# Patient Record
Sex: FEMALE | Race: WHITE | NOT HISPANIC OR LATINO | Employment: FULL TIME | URBAN - METROPOLITAN AREA
[De-identification: names, ages, dates, MRNs, and addresses within clinical notes are randomized per-mention and may not be internally consistent; named-entity substitution may affect disease eponyms.]

---

## 2017-04-25 ENCOUNTER — ALLSCRIPTS OFFICE VISIT (OUTPATIENT)
Dept: OTHER | Facility: OTHER | Age: 42
End: 2017-04-25

## 2017-11-03 ENCOUNTER — LAB REQUISITION (OUTPATIENT)
Dept: LAB | Facility: HOSPITAL | Age: 42
End: 2017-11-03
Payer: COMMERCIAL

## 2017-11-03 ENCOUNTER — GENERIC CONVERSION - ENCOUNTER (OUTPATIENT)
Dept: OTHER | Facility: OTHER | Age: 42
End: 2017-11-03

## 2017-11-03 DIAGNOSIS — Z11.51 ENCOUNTER FOR SCREENING FOR HUMAN PAPILLOMAVIRUS (HPV): ICD-10-CM

## 2017-11-03 DIAGNOSIS — Z12.4 ENCOUNTER FOR SCREENING FOR MALIGNANT NEOPLASM OF CERVIX: ICD-10-CM

## 2017-11-03 DIAGNOSIS — Z12.31 ENCOUNTER FOR SCREENING MAMMOGRAM FOR MALIGNANT NEOPLASM OF BREAST: ICD-10-CM

## 2017-11-03 DIAGNOSIS — N92.6 IRREGULAR MENSTRUATION: ICD-10-CM

## 2017-11-03 PROCEDURE — G0145 SCR C/V CYTO,THINLAYER,RESCR: HCPCS | Performed by: NURSE PRACTITIONER

## 2017-11-03 PROCEDURE — 87624 HPV HI-RISK TYP POOLED RSLT: CPT | Performed by: NURSE PRACTITIONER

## 2017-11-08 LAB — HPV RRNA GENITAL QL NAA+PROBE: NORMAL

## 2017-11-10 ENCOUNTER — GENERIC CONVERSION - ENCOUNTER (OUTPATIENT)
Dept: OTHER | Facility: OTHER | Age: 42
End: 2017-11-10

## 2017-11-10 LAB
LAB AP GYN PRIMARY INTERPRETATION: NORMAL
Lab: NORMAL

## 2017-11-13 ENCOUNTER — GENERIC CONVERSION - ENCOUNTER (OUTPATIENT)
Dept: OTHER | Facility: OTHER | Age: 42
End: 2017-11-13

## 2017-12-04 ENCOUNTER — GENERIC CONVERSION - ENCOUNTER (OUTPATIENT)
Dept: OTHER | Facility: OTHER | Age: 42
End: 2017-12-04

## 2018-01-13 NOTE — MISCELLANEOUS
Message   Recorded as Task   Date: 11/10/2017 03:58 PM, Created By: Carlos Ledbetter   Task Name: Care Coordination   Assigned To: [facility]   Regarding Patient: Preeti Welch, Status: In Progress   Clarisse Crockre - 10 Nov 2017 3:58 PM     TASK CREATED  Normal TSH   Please notify patient   Recommedn proceeding with TVUS as discussed     Thanks   Carmella Oro - 10 Nov 2017 4:08 PM     TASK IN PROGRESS   Carmella Oro - 10 Nov 2017 4:09 PM     TASK EDITED  Patient is aware of results           Active Problems    1  Cervical cancer screening (V76 2) (Z12 4)   2  Encounter for routine gynecological examination (V72 31) (Z01 419)   3  Encounter for screening mammogram for malignant neoplasm of breast (V76 12)   (Z12 31)   4  Irregular menses (626 4) (N92 6)   5  Ovarian cyst (620 2) (N83 20)   6  Pain of left clavicle (733 90) (M89 8X1)   7  Screening for breast cancer (V76 10) (Z12 31)   8  Screening for human papillomavirus (HPV) (V73 81) (Z11 51)    Current Meds   1  Fluticasone Propionate 50 MCG/ACT Nasal Suspension; USE 1 SPRAY IN EACH   NOSTRIL ONCE DAILY; Therapy: [id number] to (Last Rx:[id number])  Requested for: 51Htc9727 Ordered    Allergies    1   Amoxicillin CAPS    Signatures   Electronically signed by : Radha Urbina, ; Nov 10 2017  4:09PM EST                       (Author)

## 2018-01-13 NOTE — MISCELLANEOUS
Message   Recorded as Task   Date: 11/10/2017 02:33 PM, Created By: Yamilex Bartlett   Task Name: Call Back   Assigned To: JAYY GYN,Team   Regarding Patient: Codie Gamez, Status: In Progress   Comment:    Angelique Velásquez - 10 Nov 2017 2:33 PM     TASK CREATED  Pt called office today, left voicemail message  Pt had "new patient" appointment with Tyree Valenzuela on 11/3/17  CHAKA Monroy assessed Pt with irregular bleeding, ordered ultrasound  Pt states she completed blood work, wants to know does she still need to complete ultrasound  Pt can be reached @ [phone]  Thank you! Karina Villareal - 10 Nov 2017 3:03 PM     TASK EDITED   Karina Villareal - 10 Nov 2017 3:12 PM     TASK EDITED  Patient is going to [facility] for US, they questioned whether or not it is transvaginal when she scheduled  The order does not say that, she wants to know if that's what we want, if so new order needs to be mailed to patient  Karina Villareal - 10 Nov 2017 3:12 PM     TASK EDITED   Hillary Sigala - 10 Nov 2017 3:46 PM     TASK REPLIED TO: Previously Assigned To Hillary Sigala  Yes - transvaginal US, please   Yoshi Tsang - 13 Nov 2017 10:25 AM     TASK EDITED  lmtcb  transvag and transvag u/s entered   Mecca Shoulders - 13 Nov 2017 5:21 PM     TASK EDITED  pt will cb with fax #        Active Problems    1  Cervical cancer screening (V76 2) (Z12 4)   2  Encounter for routine gynecological examination (V72 31) (Z01 419)   3  Encounter for screening mammogram for malignant neoplasm of breast (V76 12)   (Z12 31)   4  Irregular menses (626 4) (N92 6)   5  Ovarian cyst (620 2) (N83 20)   6  Pain of left clavicle (733 90) (M89 8X1)   7  Screening for breast cancer (V76 10) (Z12 31)   8  Screening for human papillomavirus (HPV) (V73 81) (Z11 51)    Current Meds   1  Fluticasone Propionate 50 MCG/ACT Nasal Suspension; USE 1 SPRAY IN EACH   NOSTRIL ONCE DAILY;    Therapy: [id number] to (Last Rx:25Apr2017)  Requested for: 25Apr2017 Ordered    Allergies    1   Amoxicillin CAPS    Signatures   Electronically signed by : Eleanor Reza, ; Nov 13 2017  5:21PM EST                       (Author)

## 2018-01-15 VITALS
HEART RATE: 87 BPM | TEMPERATURE: 97.6 F | BODY MASS INDEX: 33.46 KG/M2 | OXYGEN SATURATION: 98 % | SYSTOLIC BLOOD PRESSURE: 108 MMHG | WEIGHT: 196 LBS | DIASTOLIC BLOOD PRESSURE: 78 MMHG | HEIGHT: 64 IN

## 2018-01-23 NOTE — MISCELLANEOUS
Message   Recorded as Task   Date: 12/01/2017 03:18 PM, Created By: Yasemin Kang   Task Name: Co-Sign Note   Assigned To: Rocael Falk   Regarding Patient: Anibal Rivera, Status: In Progress   Beronica Coleman - 01 Dec 2017 3:18 PM     TASK CREATED  Benign findings on pelvic uS and TSH was WNL  These were ordered for new onset irreg menses  Please notify patient of no underlying gyn concerns and advised continuing to observe  She may f/u any time if she desires active management  Thanks   Carmella Oro - 04 Dec 2017 8:53 AM     TASK IN PROGRESS   Carmella Oro - 04 Dec 2017 8:56 AM     TASK EDITED  Per HIPPA - LM for pt of u/s results and TSH results  If any questions to call back  Active Problems    1  Cervical cancer screening (V76 2) (Z12 4)   2  Encounter for routine gynecological examination (V72 31) (Z01 419)   3  Encounter for screening mammogram for malignant neoplasm of breast (V76 12)   (Z12 31)   4  Irregular menses (626 4) (N92 6)   5  Ovarian cyst (620 2) (N83 20)   6  Pain of left clavicle (733 90) (M89 8X1)   7  Screening for breast cancer (V76 10) (Z12 31)   8  Screening for human papillomavirus (HPV) (V73 81) (Z11 51)    Current Meds   1  Fluticasone Propionate 50 MCG/ACT Nasal Suspension; USE 1 SPRAY IN EACH   NOSTRIL ONCE DAILY; Therapy: [id number] to (Last Rx:94Qnk7911)  Requested for: 79Zjq5523 Ordered    Allergies    1   Amoxicillin CAPS    Signatures   Electronically signed by : Khris Ramachandran, ; Dec  4 2017  8:57AM EST                       (Author)

## 2018-05-11 ENCOUNTER — OFFICE VISIT (OUTPATIENT)
Dept: INTERNAL MEDICINE CLINIC | Age: 43
End: 2018-05-11
Payer: COMMERCIAL

## 2018-05-11 VITALS
TEMPERATURE: 98.6 F | DIASTOLIC BLOOD PRESSURE: 86 MMHG | SYSTOLIC BLOOD PRESSURE: 122 MMHG | HEART RATE: 81 BPM | BODY MASS INDEX: 34.02 KG/M2 | HEIGHT: 65 IN | WEIGHT: 204.2 LBS | OXYGEN SATURATION: 98 %

## 2018-05-11 DIAGNOSIS — L65.9 HAIR LOSS: ICD-10-CM

## 2018-05-11 DIAGNOSIS — R51.9 ACUTE INTRACTABLE HEADACHE, UNSPECIFIED HEADACHE TYPE: ICD-10-CM

## 2018-05-11 DIAGNOSIS — Z13.228 SCREENING FOR METABOLIC DISORDER: Primary | ICD-10-CM

## 2018-05-11 PROBLEM — N92.6 IRREGULAR MENSES: Status: ACTIVE | Noted: 2017-11-03

## 2018-05-11 PROCEDURE — 1036F TOBACCO NON-USER: CPT | Performed by: NURSE PRACTITIONER

## 2018-05-11 PROCEDURE — 99214 OFFICE O/P EST MOD 30 MIN: CPT | Performed by: NURSE PRACTITIONER

## 2018-05-11 PROCEDURE — 3008F BODY MASS INDEX DOCD: CPT | Performed by: NURSE PRACTITIONER

## 2018-05-11 RX ORDER — MULTIVITAMIN/IRON/FOLIC ACID 18MG-0.4MG
1 TABLET ORAL AS NEEDED
COMMUNITY

## 2018-05-11 NOTE — ASSESSMENT & PLAN NOTE
Will get TSH vitamin D  And fasting blood work  Encouraged the patient to take over-the-counter biotin  If patient's hair loss increases patient is to come back to the office to be seen, with possible referral to Dermatology  It is felt that her hair loss is related to hormone changes as TSH was checked recently and within normal limits

## 2018-05-11 NOTE — ASSESSMENT & PLAN NOTE
Patient feels that her headaches are related to allergies  She reports she has seasonal allergies  Will give over-the-counter Flonase 2 sprays each nostril daily and advised patient to take Zyrtec at night prior to going to bed  Discussed red flag signs of headaches and when to report to the emergency room

## 2018-05-11 NOTE — PATIENT INSTRUCTIONS
Biotin  Continue to take multi-vitamins  Use Flonase 2 sprays each nostril daily in the morning  Take Zyrtec once a daily in the evening

## 2018-12-11 ENCOUNTER — OFFICE VISIT (OUTPATIENT)
Dept: INTERNAL MEDICINE CLINIC | Facility: CLINIC | Age: 43
End: 2018-12-11
Payer: COMMERCIAL

## 2018-12-11 VITALS
BODY MASS INDEX: 31.31 KG/M2 | SYSTOLIC BLOOD PRESSURE: 132 MMHG | DIASTOLIC BLOOD PRESSURE: 74 MMHG | OXYGEN SATURATION: 99 % | TEMPERATURE: 98.7 F | WEIGHT: 194.8 LBS | HEIGHT: 66 IN | HEART RATE: 87 BPM

## 2018-12-11 DIAGNOSIS — F32.A ANXIETY AND DEPRESSION: ICD-10-CM

## 2018-12-11 DIAGNOSIS — L65.9 HAIR LOSS: Primary | ICD-10-CM

## 2018-12-11 DIAGNOSIS — F41.9 ANXIETY AND DEPRESSION: ICD-10-CM

## 2018-12-11 PROCEDURE — 99214 OFFICE O/P EST MOD 30 MIN: CPT | Performed by: INTERNAL MEDICINE

## 2018-12-11 RX ORDER — MELATONIN
1000 DAILY
COMMUNITY
End: 2019-09-17 | Stop reason: ALTCHOICE

## 2018-12-11 RX ORDER — ESCITALOPRAM OXALATE 10 MG/1
10 TABLET ORAL DAILY
Qty: 30 TABLET | Refills: 1 | Status: SHIPPED | OUTPATIENT
Start: 2018-12-11 | End: 2019-01-23 | Stop reason: SDUPTHER

## 2018-12-11 NOTE — PROGRESS NOTES
MEDICAL STUDENT  Inpatient Progress Note for TRAINING ONLY  Not Part of Legal Medical Record       Progress Note - Sabas Drilling 37 y o  female MRN: [id number]    Unit/Bed#:  Encounter: [id number]      Assessment:  ***    Plan:  ***    Subjective:   ***    Objective:     Vitals: Blood pressure 132/74, pulse 87, temperature 98 7 °F (37 1 °C), temperature source Oral, height 5' 5 5" (1 664 m), weight 88 4 kg (194 lb 12 8 oz), SpO2 99 %  ,Body mass index is 31 92 kg/m²      [unfilled]    Physical Exam: {Exam, Complete:88309}     Invasive Devices          No matching active lines, drains, or airways          Lab, Imaging and other studies: {Results Review Statement:79825}  VTE Pharmacologic Prophylaxis: {Pharmacologic VTE Prophylaxis:751066461}  VTE Mechanical Prophylaxis: {Mechanical VTE Prophylaxis:79794}

## 2018-12-11 NOTE — PROGRESS NOTES
Assessment/Plan:    No problem-specific Assessment & Plan notes found for this encounter  Diagnoses and all orders for this visit:    Hair loss  For the hair loss I will check the TSH and ANGELICA also I am checking the vitamin-D level  Discussed with patient the her loss could be because of the anxiety stress and depression which can cause the hair loss so we will visit after the blood workup  Anxiety and depression  This is around the going on for several weeks and months that she is feeling depressed as given below I will start her on Lexapro 5 mg to start with increasing it to 10 mg after 1 week  Other orders  -     cholecalciferol (VITAMIN D3) 1,000 units tablet; Take 1,000 Units by mouth daily        Subjective:   As given above   Patient ID: Fidel Snellen is a 37 y o  female  Patient presents stating she has increased depression and anxiety as well as worsening hair loss over the past month  This has been an issue for her since around April/May and discussed it the last time she was here but feels it may be worsening  She was referred to get bloodwork done to rule out other pathology but she did not get it done because she was feeling better at the time  She does take Vitamin D daily per recommendation but stopped taking Biotin  She notices the hair loss the most in the shower, stating clumps come out at a time  She reports thinning of the frontal portions of her hair but denies specific patches of hair loss  Depression/Anxiety:  She feels her emotions alterate from either feeling down of being anxious  Her  noticed her change in behavior and recommended she see a physician  Decreased interest in things she used to enjoy such as going out with her friends and has canceled on them on occasion  Sometimes doesn't feel like eating when she gets worked up   Sleeping has become more difficult for her, stating she will wake up about once per night and it will take her a long time to go back to sleep  Has never been on medications or been to counseling  Anxiety   Presents for initial visit  Episode onset: since April/May  Symptoms include depressed mood, excessive worry, insomnia (wake during the night and can't go back to sleep), nausea and nervous/anxious behavior  Patient reports no chest pain, decreased concentration, dizziness, hyperventilation, malaise, palpitations, panic, shortness of breath or suicidal ideas  Symptoms occur most days (half the week)  The severity of symptoms is moderate  Exacerbated by: worry  The quality of sleep is poor  Nighttime awakenings: one to two (once but can't go back to sleep for couple hours)  Depression   Associated symptoms include nausea  Pertinent negatives include no abdominal pain, chest pain, chills, fatigue, fever or headaches  The following portions of the patient's history were reviewed and updated as appropriate: allergies, current medications, past family history, past medical history, past social history, past surgical history and problem list     Review of Systems   Constitutional: Positive for appetite change (sometimes if get worked up won't want to eat)  Negative for chills, fatigue and fever  Respiratory: Negative for shortness of breath  Cardiovascular: Negative for chest pain, palpitations and leg swelling  Gastrointestinal: Positive for nausea  Negative for abdominal pain  Genitourinary: Negative for difficulty urinating and dysuria  Neurological: Negative for dizziness, seizures, light-headedness and headaches  Psychiatric/Behavioral: Positive for depression and sleep disturbance  Negative for decreased concentration, hallucinations, self-injury and suicidal ideas  The patient is nervous/anxious and has insomnia (wake during the night and can't go back to sleep)  The patient is not hyperactive            Objective:      /74 (BP Location: Left arm, Patient Position: Sitting, Cuff Size: Large)   Pulse 87   Temp 98 7 °F (37 1 °C) (Oral)   Ht 5' 5 5" (1 664 m)   Wt 88 4 kg (194 lb 12 8 oz)   SpO2 99%   BMI 31 92 kg/m²          Physical Exam   Constitutional: She is oriented to person, place, and time  She appears well-developed and well-nourished  No distress  HENT:   Head: Normocephalic and atraumatic  Right Ear: External ear normal    Left Ear: External ear normal    Nose: Nose normal    Mouth/Throat: Oropharynx is clear and moist  No oropharyngeal exudate  Hair is noted to be thin in nature, mostly around patient's face  No patches of hair missing  Eyes: Pupils are equal, round, and reactive to light  Conjunctivae and EOM are normal  Right eye exhibits no discharge  Left eye exhibits no discharge  Neck: Normal range of motion  Neck supple  No thyromegaly present  Cardiovascular: Normal rate, regular rhythm, normal heart sounds and intact distal pulses  Exam reveals no gallop and no friction rub  No murmur heard  Pulmonary/Chest: Effort normal and breath sounds normal  No stridor  No respiratory distress  She has no wheezes  She has no rales  Abdominal: Soft  Bowel sounds are normal  She exhibits no distension  There is no tenderness  Lymphadenopathy:     She has no cervical adenopathy  Neurological: She is alert and oriented to person, place, and time  Skin: Skin is warm and dry  No rash noted  She is not diaphoretic  No erythema  Psychiatric: She has a normal mood and affect  Her behavior is normal  Judgment and thought content normal    Nursing note and vitals reviewed

## 2019-01-23 ENCOUNTER — OFFICE VISIT (OUTPATIENT)
Dept: INTERNAL MEDICINE CLINIC | Age: 44
End: 2019-01-23
Payer: COMMERCIAL

## 2019-01-23 VITALS
TEMPERATURE: 99.4 F | BODY MASS INDEX: 33.8 KG/M2 | HEART RATE: 89 BPM | WEIGHT: 198 LBS | OXYGEN SATURATION: 98 % | DIASTOLIC BLOOD PRESSURE: 78 MMHG | HEIGHT: 64 IN | SYSTOLIC BLOOD PRESSURE: 116 MMHG

## 2019-01-23 DIAGNOSIS — E55.9 VITAMIN D DEFICIENCY: Primary | ICD-10-CM

## 2019-01-23 DIAGNOSIS — L65.9 HAIR LOSS: ICD-10-CM

## 2019-01-23 DIAGNOSIS — F32.A ANXIETY AND DEPRESSION: ICD-10-CM

## 2019-01-23 DIAGNOSIS — F41.9 ANXIETY AND DEPRESSION: ICD-10-CM

## 2019-01-23 PROCEDURE — 3008F BODY MASS INDEX DOCD: CPT | Performed by: INTERNAL MEDICINE

## 2019-01-23 PROCEDURE — 99214 OFFICE O/P EST MOD 30 MIN: CPT | Performed by: INTERNAL MEDICINE

## 2019-01-23 RX ORDER — ERGOCALCIFEROL 1.25 MG/1
50000 CAPSULE ORAL WEEKLY
Qty: 8 CAPSULE | Refills: 0 | Status: SHIPPED | OUTPATIENT
Start: 2019-01-23 | End: 2019-03-27 | Stop reason: SDUPTHER

## 2019-01-23 RX ORDER — ESCITALOPRAM OXALATE 10 MG/1
10 TABLET ORAL DAILY
Qty: 30 TABLET | Refills: 3 | Status: SHIPPED | OUTPATIENT
Start: 2019-01-23 | End: 2019-09-17 | Stop reason: SDUPTHER

## 2019-01-23 RX ORDER — ESCITALOPRAM OXALATE 10 MG/1
10 TABLET ORAL DAILY
Qty: 30 TABLET | Refills: 1 | Status: SHIPPED | OUTPATIENT
Start: 2019-01-23 | End: 2019-01-23 | Stop reason: SDUPTHER

## 2019-01-23 NOTE — PROGRESS NOTES
Assessment/Plan:    No problem-specific Assessment & Plan notes found for this encounter  Diagnoses and all orders for this visit:    Hair loss  Hair loss is better than before that is she has losing less hair as compared to before  Anxiety and depression  -     escitalopram (LEXAPRO) 10 mg tablet; Take 1 tablet (10 mg total) by mouth daily    patient's anxiety and depression is better than before, she is enjoying her time or with the family and the kids, she is not crying much, sadness is better than before, I am side effects of the medication but now she is feeling better and she is tolerating the medication, her sleep is better I reviewed the blood workup thyroid functions were normal blood count chemistries were all within normal range only problem was vitamin-D deficiency which we are addressing by giving her the supplement    Subjective:      Patient ID: Doug Urbina is a 37 y o  female  HPI    The following portions of the patient's history were reviewed and updated as appropriate: allergies, current medications, past family history, past medical history, past social history, past surgical history and problem list     Review of Systems   Constitutional: Negative for appetite change (sometimes if get worked up won't want to eat), chills, fatigue and fever  Respiratory: Negative for shortness of breath  Cardiovascular: Negative for chest pain, palpitations and leg swelling  Gastrointestinal: Negative for abdominal pain and nausea  Genitourinary: Negative for difficulty urinating and dysuria  Neurological: Negative for dizziness, seizures, light-headedness and headaches  Psychiatric/Behavioral: Negative for decreased concentration, hallucinations, self-injury, sleep disturbance and suicidal ideas  The patient is nervous/anxious  The patient is not hyperactive            Past Medical History:   Diagnosis Date    Back disorder          Current Outpatient Prescriptions:    cholecalciferol (VITAMIN D3) 1,000 units tablet, Take 1,000 Units by mouth daily, Disp: , Rfl:     escitalopram (LEXAPRO) 10 mg tablet, Take 1 tablet (10 mg total) by mouth daily, Disp: 30 tablet, Rfl: 1    multivitamin-minerals (CENTRUM ADULTS) tablet, Take 1 tablet by mouth daily, Disp: , Rfl:     Allergies   Allergen Reactions    Amoxicillin        Social History   Past Surgical History:   Procedure Laterality Date    BACK SURGERY       SECTION      CHOLECYSTECTOMY      INDUCED       times 2    TUBAL LIGATION       Family History   Problem Relation Age of Onset    Diabetes Mother     Hypertension Mother     Anxiety disorder Family         symptom    Other Family         breast disorders    Heart disease Other         cardiac disorder    Heart disease Other         cardiac disorder    Depression Father     Depression Sister        Objective:  /78 (BP Location: Left arm, Patient Position: Sitting, Cuff Size: Adult)   Pulse 89   Temp 99 4 °F (37 4 °C) (Tympanic)   Ht 5' 4 47" (1 638 m)   Wt 89 8 kg (198 lb)   SpO2 98%   BMI 33 49 kg/m²        Physical Exam   Constitutional: She is oriented to person, place, and time  She appears well-developed and well-nourished  No distress  HENT:   Head: Normocephalic and atraumatic  Right Ear: External ear normal    Left Ear: External ear normal    Nose: Nose normal    Mouth/Throat: Oropharynx is clear and moist  No oropharyngeal exudate  Hair is noted to be thin in nature, mostly around patient's face  No patches of hair missing  Eyes: Pupils are equal, round, and reactive to light  Conjunctivae and EOM are normal  Right eye exhibits no discharge  Left eye exhibits no discharge  Neck: Normal range of motion  Neck supple  No thyromegaly present  Cardiovascular: Normal rate, regular rhythm, normal heart sounds and intact distal pulses  Exam reveals no gallop and no friction rub  No murmur heard    Pulmonary/Chest: Effort normal and breath sounds normal  No stridor  No respiratory distress  She has no wheezes  She has no rales  Abdominal: Soft  Bowel sounds are normal  She exhibits no distension  There is no tenderness  Lymphadenopathy:     She has no cervical adenopathy  Neurological: She is alert and oriented to person, place, and time  Skin: Skin is warm and dry  No rash noted  She is not diaphoretic  No erythema  Psychiatric: She has a normal mood and affect  Her behavior is normal  Judgment and thought content normal    Nursing note and vitals reviewed  No results found for this or any previous visit (from the past 672 hour(s))

## 2019-03-27 DIAGNOSIS — E55.9 VITAMIN D DEFICIENCY: ICD-10-CM

## 2019-03-27 RX ORDER — ERGOCALCIFEROL 1.25 MG/1
CAPSULE ORAL
Qty: 8 CAPSULE | Refills: 0 | Status: SHIPPED | OUTPATIENT
Start: 2019-03-27 | End: 2019-06-11 | Stop reason: ALTCHOICE

## 2019-05-11 DIAGNOSIS — L65.9 HAIR LOSS: ICD-10-CM

## 2019-05-11 DIAGNOSIS — F41.9 ANXIETY AND DEPRESSION: ICD-10-CM

## 2019-05-11 DIAGNOSIS — F32.A ANXIETY AND DEPRESSION: ICD-10-CM

## 2019-05-13 RX ORDER — ESCITALOPRAM OXALATE 10 MG/1
TABLET ORAL
Qty: 30 TABLET | Refills: 1 | Status: SHIPPED | OUTPATIENT
Start: 2019-05-13 | End: 2019-06-11 | Stop reason: SDUPTHER

## 2019-06-07 DIAGNOSIS — F41.9 ANXIETY AND DEPRESSION: ICD-10-CM

## 2019-06-07 DIAGNOSIS — L65.9 HAIR LOSS: ICD-10-CM

## 2019-06-07 DIAGNOSIS — F32.A ANXIETY AND DEPRESSION: ICD-10-CM

## 2019-06-11 ENCOUNTER — OFFICE VISIT (OUTPATIENT)
Dept: INTERNAL MEDICINE CLINIC | Facility: CLINIC | Age: 44
End: 2019-06-11
Payer: COMMERCIAL

## 2019-06-11 VITALS
DIASTOLIC BLOOD PRESSURE: 70 MMHG | BODY MASS INDEX: 33.89 KG/M2 | WEIGHT: 203.4 LBS | HEIGHT: 65 IN | HEART RATE: 83 BPM | TEMPERATURE: 98.1 F | SYSTOLIC BLOOD PRESSURE: 104 MMHG | OXYGEN SATURATION: 98 %

## 2019-06-11 DIAGNOSIS — F32.A ANXIETY AND DEPRESSION: ICD-10-CM

## 2019-06-11 DIAGNOSIS — F41.9 ANXIETY AND DEPRESSION: ICD-10-CM

## 2019-06-11 DIAGNOSIS — E55.9 VITAMIN D DEFICIENCY: ICD-10-CM

## 2019-06-11 DIAGNOSIS — J30.2 SEASONAL ALLERGIES: Primary | ICD-10-CM

## 2019-06-11 PROCEDURE — 99214 OFFICE O/P EST MOD 30 MIN: CPT | Performed by: NURSE PRACTITIONER

## 2019-06-11 PROCEDURE — 3008F BODY MASS INDEX DOCD: CPT | Performed by: NURSE PRACTITIONER

## 2019-06-11 RX ORDER — ESCITALOPRAM OXALATE 10 MG/1
TABLET ORAL
Qty: 30 TABLET | Refills: 0 | Status: SHIPPED | OUTPATIENT
Start: 2019-06-11 | End: 2019-06-28 | Stop reason: ALTCHOICE

## 2019-06-12 ENCOUNTER — TELEPHONE (OUTPATIENT)
Dept: INTERNAL MEDICINE CLINIC | Facility: CLINIC | Age: 44
End: 2019-06-12

## 2019-06-28 ENCOUNTER — ANNUAL EXAM (OUTPATIENT)
Dept: OBGYN CLINIC | Facility: MEDICAL CENTER | Age: 44
End: 2019-06-28
Payer: COMMERCIAL

## 2019-06-28 VITALS
WEIGHT: 202 LBS | HEIGHT: 64 IN | SYSTOLIC BLOOD PRESSURE: 120 MMHG | DIASTOLIC BLOOD PRESSURE: 74 MMHG | BODY MASS INDEX: 34.49 KG/M2

## 2019-06-28 DIAGNOSIS — Z12.31 ENCOUNTER FOR SCREENING MAMMOGRAM FOR MALIGNANT NEOPLASM OF BREAST: Primary | ICD-10-CM

## 2019-06-28 DIAGNOSIS — Z01.419 ROUTINE GYNECOLOGICAL EXAMINATION: ICD-10-CM

## 2019-06-28 PROCEDURE — 99396 PREV VISIT EST AGE 40-64: CPT | Performed by: PHYSICIAN ASSISTANT

## 2019-09-17 ENCOUNTER — OFFICE VISIT (OUTPATIENT)
Dept: INTERNAL MEDICINE CLINIC | Facility: CLINIC | Age: 44
End: 2019-09-17
Payer: COMMERCIAL

## 2019-09-17 VITALS
HEART RATE: 88 BPM | HEIGHT: 65 IN | DIASTOLIC BLOOD PRESSURE: 70 MMHG | OXYGEN SATURATION: 97 % | BODY MASS INDEX: 33.95 KG/M2 | TEMPERATURE: 98 F | SYSTOLIC BLOOD PRESSURE: 110 MMHG | WEIGHT: 203.8 LBS

## 2019-09-17 DIAGNOSIS — F32.A ANXIETY AND DEPRESSION: ICD-10-CM

## 2019-09-17 DIAGNOSIS — L65.9 HAIR LOSS: ICD-10-CM

## 2019-09-17 DIAGNOSIS — F41.9 ANXIETY AND DEPRESSION: ICD-10-CM

## 2019-09-17 DIAGNOSIS — J30.2 SEASONAL ALLERGIES: ICD-10-CM

## 2019-09-17 PROBLEM — Z13.228 SCREENING FOR METABOLIC DISORDER: Status: RESOLVED | Noted: 2018-05-11 | Resolved: 2019-09-17

## 2019-09-17 PROBLEM — N92.6 IRREGULAR MENSES: Status: RESOLVED | Noted: 2017-11-03 | Resolved: 2019-09-17

## 2019-09-17 PROBLEM — Z01.419 ROUTINE GYNECOLOGICAL EXAMINATION: Status: RESOLVED | Noted: 2019-06-28 | Resolved: 2019-09-17

## 2019-09-17 PROBLEM — R51.9 HEADACHE: Status: RESOLVED | Noted: 2018-05-11 | Resolved: 2019-09-17

## 2019-09-17 PROCEDURE — 99214 OFFICE O/P EST MOD 30 MIN: CPT | Performed by: INTERNAL MEDICINE

## 2019-09-17 PROCEDURE — 3008F BODY MASS INDEX DOCD: CPT | Performed by: INTERNAL MEDICINE

## 2019-09-17 RX ORDER — ESCITALOPRAM OXALATE 10 MG/1
10 TABLET ORAL DAILY
Qty: 90 TABLET | Refills: 1 | Status: SHIPPED | OUTPATIENT
Start: 2019-09-17 | End: 2020-04-30

## 2019-09-17 NOTE — ASSESSMENT & PLAN NOTE
Patient has not pursued a weight management program  She states that she is feeling good about her weight now, she is not concerned about it at this time

## 2019-09-17 NOTE — ASSESSMENT & PLAN NOTE
Patient took Zyrtec for a while but it made her sleepy, so she stopped taking it  She no longer has the dizziness that was felt may be due to the allergies   She is open to discussing non drowsy allergy medication options if the allergies start bothering her again

## 2019-09-17 NOTE — ASSESSMENT & PLAN NOTE
Patient is happy with her current Lexapro dose   She does not currently have any side effects from the medication

## 2019-09-17 NOTE — PROGRESS NOTES
Assessment/Plan:  BMI Counseling: Body mass index is 33 65 kg/m²  The BMI is above normal  Nutrition recommendations include reducing portion sizes, decreasing overall calorie intake, 3-5 servings of fruits/vegetables daily, reducing fast food intake, consuming healthier snacks and decreasing soda and/or juice intake  Exercise recommendations include exercising 3-5 times per week  BMI 33 0-33 9,adult  Patient has not pursued a weight management program  She states that she is feeling good about her weight now, she is not concerned about it at this time    Seasonal allergies  Patient took Zyrtec for a while but it made her sleepy, so she stopped taking it  She no longer has the dizziness that was felt may be due to the allergies  She is open to discussing non drowsy allergy medication options if the allergies start bothering her again    Anxiety and depression  Patient is happy with her current Lexapro dose  She does not currently have any side effects from the medication       Diagnoses and all orders for this visit:    BMI 33 0-33 9,adult    Hair loss  -     escitalopram (LEXAPRO) 10 mg tablet; Take 1 tablet (10 mg total) by mouth daily    Anxiety and depression  -     escitalopram (LEXAPRO) 10 mg tablet; Take 1 tablet (10 mg total) by mouth daily    Seasonal allergies        Subjective:   No new complaints patient is here for the follow-up of above medical problems and she is doing well will follow her up in about 3 months   Patient ID: Chantell Guerrero is a 37 y o  female  HPI   Chantell Guerrero is a 38 yo F who is here for a follow up today  Her anxiety and depression are well controlled on Lexapro  She did try to stop the medication because she does not like having to take medications regularly  However, her  noticed she was "being grumpy" and asked if she was taking her medication, so she started it again  She states that she is happy with the current Lexapro dose and how it is working   She has not pursued a weight management program as was discussed as a possibility at the previous visit, however she is happy with her current weight and diet  She is not on a specific diet at this time  Her dizziness which may have been from allergies subsided two weeks after the previous visit, and she has not felt it since  She tried Zyrtec for her allergies but stopped due to drowsiness    The following portions of the patient's history were reviewed and updated as appropriate: allergies, current medications, past family history, past medical history, past social history, past surgical history and problem list     Review of Systems   Constitutional: Negative for activity change, appetite change, chills, diaphoresis, fatigue, fever and unexpected weight change  HENT: Negative for congestion and tinnitus  Eyes: Negative for visual disturbance  Respiratory: Negative for cough, chest tightness and shortness of breath  Cardiovascular: Negative for chest pain, palpitations and leg swelling  Gastrointestinal: Negative for abdominal pain, blood in stool, constipation, diarrhea, nausea and vomiting  Endocrine: Negative for cold intolerance, heat intolerance, polydipsia, polyphagia and polyuria  Genitourinary: Negative for difficulty urinating, dysuria, frequency and hematuria  Musculoskeletal: Negative for arthralgias, back pain and myalgias  Neurological: Negative for dizziness, weakness, light-headedness, numbness and headaches  Psychiatric/Behavioral: Negative for confusion, decreased concentration, dysphoric mood, hallucinations, self-injury, sleep disturbance and suicidal ideas  The patient is not nervous/anxious            Objective:      /70 (BP Location: Left arm, Patient Position: Sitting, Cuff Size: Adult)   Pulse 88   Temp 98 °F (36 7 °C) (Oral)   Ht 5' 5 25" (1 657 m)   Wt 92 4 kg (203 lb 12 8 oz)   SpO2 97%   BMI 33 65 kg/m²          Physical Exam   Constitutional: She is oriented to person, place, and time  She appears well-developed and well-nourished  No distress  HENT:   Head: Normocephalic and atraumatic  Cardiovascular: Normal rate, regular rhythm, normal heart sounds and intact distal pulses  Pulmonary/Chest: Effort normal and breath sounds normal    Abdominal: Soft  She exhibits no distension  There is no tenderness  Neurological: She is alert and oriented to person, place, and time  Skin: She is not diaphoretic  Psychiatric: She has a normal mood and affect

## 2020-01-03 ENCOUNTER — APPOINTMENT (OUTPATIENT)
Dept: LAB | Age: 45
End: 2020-01-03
Payer: COMMERCIAL

## 2020-01-03 DIAGNOSIS — F41.9 ANXIETY AND DEPRESSION: ICD-10-CM

## 2020-01-03 DIAGNOSIS — F32.A ANXIETY AND DEPRESSION: ICD-10-CM

## 2020-01-03 DIAGNOSIS — E55.9 VITAMIN D DEFICIENCY: ICD-10-CM

## 2020-01-03 LAB
25(OH)D3 SERPL-MCNC: 20.7 NG/ML (ref 30–100)
ANION GAP SERPL CALCULATED.3IONS-SCNC: 4 MMOL/L (ref 4–13)
BASOPHILS # BLD AUTO: 0.05 THOUSANDS/ΜL (ref 0–0.1)
BASOPHILS NFR BLD AUTO: 1 % (ref 0–1)
BUN SERPL-MCNC: 14 MG/DL (ref 5–25)
CALCIUM SERPL-MCNC: 8.7 MG/DL (ref 8.3–10.1)
CHLORIDE SERPL-SCNC: 107 MMOL/L (ref 100–108)
CHOLEST SERPL-MCNC: 284 MG/DL (ref 50–200)
CO2 SERPL-SCNC: 28 MMOL/L (ref 21–32)
CREAT SERPL-MCNC: 0.76 MG/DL (ref 0.6–1.3)
EOSINOPHIL # BLD AUTO: 0.11 THOUSAND/ΜL (ref 0–0.61)
EOSINOPHIL NFR BLD AUTO: 2 % (ref 0–6)
ERYTHROCYTE [DISTWIDTH] IN BLOOD BY AUTOMATED COUNT: 13.6 % (ref 11.6–15.1)
GFR SERPL CREATININE-BSD FRML MDRD: 96 ML/MIN/1.73SQ M
GLUCOSE P FAST SERPL-MCNC: 91 MG/DL (ref 65–99)
HCT VFR BLD AUTO: 38.9 % (ref 34.8–46.1)
HDLC SERPL-MCNC: 58 MG/DL
HGB BLD-MCNC: 12.3 G/DL (ref 11.5–15.4)
IMM GRANULOCYTES # BLD AUTO: 0.01 THOUSAND/UL (ref 0–0.2)
IMM GRANULOCYTES NFR BLD AUTO: 0 % (ref 0–2)
LDLC SERPL CALC-MCNC: 167 MG/DL (ref 0–100)
LYMPHOCYTES # BLD AUTO: 2.24 THOUSANDS/ΜL (ref 0.6–4.47)
LYMPHOCYTES NFR BLD AUTO: 32 % (ref 14–44)
MCH RBC QN AUTO: 28.1 PG (ref 26.8–34.3)
MCHC RBC AUTO-ENTMCNC: 31.6 G/DL (ref 31.4–37.4)
MCV RBC AUTO: 89 FL (ref 82–98)
MONOCYTES # BLD AUTO: 0.34 THOUSAND/ΜL (ref 0.17–1.22)
MONOCYTES NFR BLD AUTO: 5 % (ref 4–12)
NEUTROPHILS # BLD AUTO: 4.32 THOUSANDS/ΜL (ref 1.85–7.62)
NEUTS SEG NFR BLD AUTO: 60 % (ref 43–75)
NONHDLC SERPL-MCNC: 226 MG/DL
NRBC BLD AUTO-RTO: 0 /100 WBCS
PLATELET # BLD AUTO: 182 THOUSANDS/UL (ref 149–390)
PMV BLD AUTO: 10.7 FL (ref 8.9–12.7)
POTASSIUM SERPL-SCNC: 4.4 MMOL/L (ref 3.5–5.3)
RBC # BLD AUTO: 4.38 MILLION/UL (ref 3.81–5.12)
SODIUM SERPL-SCNC: 139 MMOL/L (ref 136–145)
TRIGL SERPL-MCNC: 297 MG/DL
TSH SERPL DL<=0.05 MIU/L-ACNC: 2.28 UIU/ML (ref 0.36–3.74)
WBC # BLD AUTO: 7.07 THOUSAND/UL (ref 4.31–10.16)

## 2020-01-03 PROCEDURE — 80061 LIPID PANEL: CPT | Performed by: NURSE PRACTITIONER

## 2020-01-03 PROCEDURE — 36415 COLL VENOUS BLD VENIPUNCTURE: CPT

## 2020-01-03 PROCEDURE — 85025 COMPLETE CBC W/AUTO DIFF WBC: CPT | Performed by: NURSE PRACTITIONER

## 2020-01-03 PROCEDURE — 84443 ASSAY THYROID STIM HORMONE: CPT | Performed by: NURSE PRACTITIONER

## 2020-01-03 PROCEDURE — 82306 VITAMIN D 25 HYDROXY: CPT | Performed by: NURSE PRACTITIONER

## 2020-01-03 PROCEDURE — 80048 BASIC METABOLIC PNL TOTAL CA: CPT

## 2020-01-10 ENCOUNTER — OFFICE VISIT (OUTPATIENT)
Dept: OBGYN CLINIC | Facility: MEDICAL CENTER | Age: 45
End: 2020-01-10
Payer: COMMERCIAL

## 2020-01-10 VITALS
BODY MASS INDEX: 36.37 KG/M2 | WEIGHT: 213 LBS | SYSTOLIC BLOOD PRESSURE: 126 MMHG | DIASTOLIC BLOOD PRESSURE: 70 MMHG | HEIGHT: 64 IN

## 2020-01-10 DIAGNOSIS — N93.9 ABNORMAL UTERINE BLEEDING: Primary | ICD-10-CM

## 2020-01-10 LAB — SL AMB POCT URINE HCG: NEGATIVE

## 2020-01-10 PROCEDURE — 81025 URINE PREGNANCY TEST: CPT | Performed by: STUDENT IN AN ORGANIZED HEALTH CARE EDUCATION/TRAINING PROGRAM

## 2020-01-10 PROCEDURE — 99213 OFFICE O/P EST LOW 20 MIN: CPT | Performed by: STUDENT IN AN ORGANIZED HEALTH CARE EDUCATION/TRAINING PROGRAM

## 2020-01-10 RX ORDER — MELATONIN
1000 DAILY
COMMUNITY
End: 2020-10-13

## 2020-01-10 NOTE — ASSESSMENT & PLAN NOTE
41 yo  here for abnormal uterine bleeding, persistent bleeding since cycle in early December  Recent TSH and CBC wnl  UPT negative  Reviewed possible causes of AUB  Perimenopause, polyp, fibroid, ovarian mass, hyperplasia or malignancy  Advised pelvic US followed by EMB for hysteroscopy pending US findings  Reviewed we will plan to initiate treatment after sampling unless bleeding become heavier

## 2020-01-10 NOTE — PROGRESS NOTES
Assessment/Plan:    Abnormal uterine bleeding  39 yo  here for abnormal uterine bleeding, persistent bleeding since cycle in early December  Recent TSH and CBC wnl  Reviewed possible causes of AUB  Perimenopause, polyp, fibroid, ovarian mass, hyperplasia or malignancy  Advised pelvic US followed by EMB for hysteroscopy pending US findings  Reviewed we will plan to initiate treatment after sampling unless bleeding become heavier  Diagnoses and all orders for this visit:    Abnormal uterine bleeding  -     US pelvis complete non OB; Future  -     POCT urine HCG    Other orders  -     cholecalciferol (VITAMIN D3) 1,000 units tablet; Take 1,000 Units by mouth daily          Subjective:      Patient ID: Bart Zamudio is a 40 y o  female  39 yo  here for abnormal uterine bleeding  The patient reports she had a normal cycle in the start of December that never stopped  It has waxed in waned in flow since then  Some days she see very minimal to no bleeding other days she will experience a gush intermittently  She occasionally passes golf ball sized clots, one at at time but possibly multiple times through the day  She denies lightheadedness or dizziness  Prior to this she has had very regular cycles since an episode of a missed period a year or two ago  She denies post coital bleeding  No other menopausal symptoms  The following portions of the patient's history were reviewed and updated as appropriate: allergies, current medications, past family history, past medical history, past social history, past surgical history and problem list     Review of Systems   All other systems reviewed and are negative          Objective:      /70 (BP Location: Left arm, Patient Position: Sitting, Cuff Size: Standard)   Ht 5' 4" (1 626 m)   Wt 96 6 kg (213 lb)   LMP 2019 (Approximate)   BMI 36 56 kg/m²          Physical Exam   Constitutional: She appears well-developed and well-nourished  HENT:   Head: Normocephalic and atraumatic  Eyes: EOM are normal    Neck: Normal range of motion  Pulmonary/Chest: Effort normal    Abdominal: Soft  She exhibits no distension and no mass  There is no tenderness  There is no guarding  Genitourinary: Uterus normal  There is no rash, tenderness, lesion or injury on the right labia  There is no rash, tenderness, lesion or injury on the left labia  Uterus is not tender  Cervix exhibits no discharge and no friability  Right adnexum displays no mass, no tenderness and no fullness  Left adnexum displays no mass, no tenderness and no fullness  There is bleeding in the vagina  No erythema or tenderness in the vagina  No signs of injury around the vagina  No vaginal discharge found     Genitourinary Comments: Bimanual limited by habitus but enlarged uterus or adnexal masses not appreciated

## 2020-01-17 ENCOUNTER — HOSPITAL ENCOUNTER (OUTPATIENT)
Dept: RADIOLOGY | Facility: MEDICAL CENTER | Age: 45
Discharge: HOME | End: 2020-01-17
Payer: COMMERCIAL

## 2020-01-17 DIAGNOSIS — N93.9 ABNORMAL UTERINE BLEEDING: ICD-10-CM

## 2020-01-17 PROCEDURE — 76830 TRANSVAGINAL US NON-OB: CPT

## 2020-01-17 PROCEDURE — 76856 US EXAM PELVIC COMPLETE: CPT

## 2020-01-20 ENCOUNTER — TELEPHONE (OUTPATIENT)
Dept: OBGYN CLINIC | Facility: CLINIC | Age: 45
End: 2020-01-20

## 2020-01-20 NOTE — TELEPHONE ENCOUNTER
Called patient to review pelvic US  No answer  Left VM for her to call back  OK to review    Thickened stripe/lining- already planning EMB, will continue with this plan  Scheduled 2/7  Right ovarian cyst seen  Recommend repeat US in 6-12 weeks to follow for resolution or changes  Has appt 2/7 where we can discuss further

## 2020-01-21 ENCOUNTER — TELEPHONE (OUTPATIENT)
Dept: OBGYN CLINIC | Facility: CLINIC | Age: 45
End: 2020-01-21

## 2020-01-21 NOTE — TELEPHONE ENCOUNTER
1st attempt - [facility] today @ [phone] for Pt to call back office regarding heavy vaginal bleeding

## 2020-01-22 NOTE — TELEPHONE ENCOUNTER
If feeling dizzy/lightheaded/SOB ok to place CBC to collect prior but recent labs 1/3 wnl  Continue to monitor  OK to wait until follow up unless symptoms of anemia with abnormal hgb or heavy bleeding (pad no hour, etc)

## 2020-01-22 NOTE — TELEPHONE ENCOUNTER
Patient verbalizes understanding  Denies sob/dizzy lightheaded  States bleeding is improved  Will wait until 2/7 appt

## 2020-02-07 ENCOUNTER — PROCEDURE VISIT (OUTPATIENT)
Dept: OBGYN CLINIC | Facility: MEDICAL CENTER | Age: 45
End: 2020-02-07
Payer: COMMERCIAL

## 2020-02-07 VITALS
HEIGHT: 64 IN | BODY MASS INDEX: 35.34 KG/M2 | DIASTOLIC BLOOD PRESSURE: 82 MMHG | SYSTOLIC BLOOD PRESSURE: 130 MMHG | WEIGHT: 207 LBS

## 2020-02-07 DIAGNOSIS — R93.89 THICKENED ENDOMETRIUM: Primary | ICD-10-CM

## 2020-02-07 LAB — SL AMB POCT URINE HCG: NORMAL

## 2020-02-07 PROCEDURE — 88305 TISSUE EXAM BY PATHOLOGIST: CPT | Performed by: PATHOLOGY

## 2020-02-07 PROCEDURE — 58100 BIOPSY OF UTERUS LINING: CPT | Performed by: STUDENT IN AN ORGANIZED HEALTH CARE EDUCATION/TRAINING PROGRAM

## 2020-02-07 PROCEDURE — 81025 URINE PREGNANCY TEST: CPT | Performed by: STUDENT IN AN ORGANIZED HEALTH CARE EDUCATION/TRAINING PROGRAM

## 2020-02-07 NOTE — PROGRESS NOTES
Endometrial biopsy  Date/Time: 2/7/2020 7:42 AM  Performed by: Alvin Krishna MD  Authorized by: Alvin Krishna MD     Consent:     Consent obtained:  Verbal and written    Consent given by:  Patient    Procedural risks discussed:  Bleeding, damage to other organs and infection    Patient questions answered: yes      Patient agrees, verbalizes understanding, and wants to proceed: yes    Indication:     Indications: Other disorder of menstruation and other abnormal bleeding from female genital tract      Indications comment:  AUB  Procedure:     Procedure: endometrial biopsy with Pipelle      A bivalve speculum was placed in the vagina: yes      Cervix cleaned and prepped: yes      Uterus sounded: yes      Uterus sound depth (cm):  11    Specimen collected: specimen collected and sent to pathology      Patient tolerated procedure well with no complications: yes    Findings:     Uterus size:  9-10 weeks    Cervix: normal      Adnexa: normal    Comments:      Uncomplicated EMB  Sounds to 11 cm  3 passes as significant blood noted in 2 of passes  Patient having moderate bleeding at time of exam  Will start aygestin 5 mg daily to try to resolve current episode while awaiting results and future plan of care  We did review prior US from 1/17 and thickened lining as well as right bilobed cyst  Recommend follow up in 6-12 wks  Will get ordered and scheduled following EMB path results

## 2020-02-11 ENCOUNTER — TELEPHONE (OUTPATIENT)
Dept: OBGYN CLINIC | Facility: CLINIC | Age: 45
End: 2020-02-11

## 2020-02-11 NOTE — TELEPHONE ENCOUNTER
Called patient to review benign EMB pathology  Has follow up 2/28 to discuss plan of care  No answer, left VM for her to call back  Ok to review if returns call

## 2020-02-12 NOTE — TELEPHONE ENCOUNTER
Pt contacted # [phone] vm- per hippa communication consent on file- lmom advising as directed with call back number should she have any questions or concerns

## 2020-02-13 ENCOUNTER — OFFICE VISIT (OUTPATIENT)
Dept: INTERNAL MEDICINE CLINIC | Age: 45
End: 2020-02-13
Payer: COMMERCIAL

## 2020-02-13 VITALS
WEIGHT: 208 LBS | BODY MASS INDEX: 35.51 KG/M2 | HEART RATE: 76 BPM | TEMPERATURE: 97.9 F | DIASTOLIC BLOOD PRESSURE: 62 MMHG | SYSTOLIC BLOOD PRESSURE: 108 MMHG | HEIGHT: 64 IN | OXYGEN SATURATION: 98 %

## 2020-02-13 DIAGNOSIS — F41.9 ANXIETY AND DEPRESSION: ICD-10-CM

## 2020-02-13 DIAGNOSIS — Z11.4 ENCOUNTER FOR SCREENING FOR HIV: ICD-10-CM

## 2020-02-13 DIAGNOSIS — F32.A ANXIETY AND DEPRESSION: ICD-10-CM

## 2020-02-13 DIAGNOSIS — E78.00 HYPERCHOLESTEROLEMIA: ICD-10-CM

## 2020-02-13 PROCEDURE — 3008F BODY MASS INDEX DOCD: CPT | Performed by: INTERNAL MEDICINE

## 2020-02-13 PROCEDURE — 99214 OFFICE O/P EST MOD 30 MIN: CPT | Performed by: INTERNAL MEDICINE

## 2020-02-13 PROCEDURE — 1036F TOBACCO NON-USER: CPT | Performed by: INTERNAL MEDICINE

## 2020-02-13 NOTE — PROGRESS NOTES
Assessment/Plan:     Diagnoses and all orders for this visit:    BMI 33 0-33 9,adult  Diet exercise  Anxiety and depression  Continue with the Lexapro  Hypercholesterolemia  Follow-up the lipid panel in about 3 months  Encounter for screening for HIV  -     HIV 1/2 AG-AB combo; Future        BMI Counseling: Body mass index is 35 7 kg/m²  The BMI is above normal  Nutrition recommendations include decreasing portion sizes, encouraging healthy choices of fruits and vegetables and moderation in carbohydrate intake  Exercise recommendations include moderate physical activity 150 minutes/week  Subjective:   Chief Complaint   Patient presents with    Follow-up     Pt is here today for 4 month follow-up        Patient ID: Andreas Ayala is a 40 y o  female  HPI  This is a very pleasant 40 year years young lady she is here for follow-up of her chronic condition anxiety and depression is doing better she is on Lexapro and this is helping her no side effects of the medication will continue with the same medication and no changes  Recently was seen by the gynecologist for excessive vaginal bleeding Pap smear was done and also patient was started on the medication her bleeding is better than before  I recommend diet exercise for the weight loss also total cholesterol is significantly elevated her HDL cholesterol is good but her LDL cholesterol is significantly elevated I will give her some diet information and also discussed with her the diet and will see her back in about 3 months with lipid panel and if the lipid panel showing improvement will continue to work on it her 10 years cardiovascular risk is around 1 3  The following portions of the patient's history were reviewed and updated as appropriate: allergies, current medications, past family history, past medical history, past social history, past surgical history and problem list     Review of Systems   Constitutional: Negative for chills and fatigue  HENT: Negative for congestion, ear pain, hearing loss, postnasal drip, sinus pressure, sore throat and voice change  Eyes: Negative for pain, discharge and visual disturbance  Respiratory: Negative for cough, chest tightness and shortness of breath  Cardiovascular: Negative for chest pain, palpitations and leg swelling  Gastrointestinal: Negative for abdominal pain, blood in stool, diarrhea, nausea and rectal pain  Genitourinary: Negative for difficulty urinating, dysuria and urgency  Musculoskeletal: Negative for arthralgias and joint swelling  Skin: Negative for rash  Allergic/Immunologic: Negative for environmental allergies and food allergies  Neurological: Negative for dizziness, tremors, weakness, numbness and headaches  Hematological: Negative for adenopathy  Psychiatric/Behavioral: Negative for behavioral problems and hallucinations           Past Medical History:   Diagnosis Date    Back disorder     Depression          Current Outpatient Medications:     cholecalciferol (VITAMIN D3) 1,000 units tablet, Take 1,000 Units by mouth daily, Disp: , Rfl:     escitalopram (LEXAPRO) 10 mg tablet, Take 1 tablet (10 mg total) by mouth daily, Disp: 90 tablet, Rfl: 1    multivitamin-minerals (CENTRUM ADULTS) tablet, Take 1 tablet by mouth daily, Disp: , Rfl:     norethindrone (AYGESTIN) 5 mg tablet, Take 1 tablet (5 mg total) by mouth daily, Disp: 28 tablet, Rfl: 1    Allergies   Allergen Reactions    Amoxicillin        Social History   Past Surgical History:   Procedure Laterality Date    BACK SURGERY       SECTION      CHOLECYSTECTOMY      ENDOMETRIAL BIOPSY  2020    INDUCED       times 2    TUBAL LIGATION       Family History   Problem Relation Age of Onset    Diabetes Mother     Hypertension Mother     Anxiety disorder Family         symptom    Other Family         breast disorders    Heart disease Other         cardiac disorder    Heart disease Other cardiac disorder    Depression Father     Depression Sister        Objective:  /62 (BP Location: Left arm, Patient Position: Sitting, Cuff Size: Large)   Pulse 76   Temp 97 9 °F (36 6 °C) (Tympanic)   Ht 5' 4" (1 626 m)   Wt 94 3 kg (208 lb)   SpO2 98%   BMI 35 70 kg/m²        Physical Exam   Constitutional: She is oriented to person, place, and time  She appears well-developed and well-nourished  HENT:   Right Ear: External ear normal    Mouth/Throat: Oropharynx is clear and moist    Eyes: Pupils are equal, round, and reactive to light  Conjunctivae and EOM are normal    Neck: Normal range of motion  No JVD present  No thyromegaly present  Cardiovascular: Normal rate, regular rhythm, normal heart sounds and intact distal pulses  Pulmonary/Chest: Breath sounds normal    Abdominal: Soft  Bowel sounds are normal    Musculoskeletal: Normal range of motion  Lymphadenopathy:     She has no cervical adenopathy  Neurological: She is alert and oriented to person, place, and time  She has normal reflexes  Skin: Skin is dry  Psychiatric: She has a normal mood and affect   Her behavior is normal  Judgment and thought content normal

## 2020-02-13 NOTE — PATIENT INSTRUCTIONS

## 2020-02-27 DIAGNOSIS — R93.89 THICKENED ENDOMETRIUM: ICD-10-CM

## 2020-02-28 ENCOUNTER — OFFICE VISIT (OUTPATIENT)
Dept: OBGYN CLINIC | Facility: MEDICAL CENTER | Age: 45
End: 2020-02-28
Payer: COMMERCIAL

## 2020-02-28 VITALS — BODY MASS INDEX: 35.53 KG/M2 | DIASTOLIC BLOOD PRESSURE: 76 MMHG | WEIGHT: 207 LBS | SYSTOLIC BLOOD PRESSURE: 116 MMHG

## 2020-02-28 DIAGNOSIS — N83.201 CYST OF RIGHT OVARY: Primary | ICD-10-CM

## 2020-02-28 DIAGNOSIS — N93.9 ABNORMAL UTERINE BLEEDING: ICD-10-CM

## 2020-02-28 PROCEDURE — 1036F TOBACCO NON-USER: CPT | Performed by: STUDENT IN AN ORGANIZED HEALTH CARE EDUCATION/TRAINING PROGRAM

## 2020-02-28 PROCEDURE — 99213 OFFICE O/P EST LOW 20 MIN: CPT | Performed by: STUDENT IN AN ORGANIZED HEALTH CARE EDUCATION/TRAINING PROGRAM

## 2020-02-28 NOTE — ASSESSMENT & PLAN NOTE
41 yo with new onset AUB    Bleeding initially improved with aygestin, returned and feels like cycle now  EMB benign  US with thickened lining, 25 mm  Pap 2017 Neg NILM  Recommend repeat sampling with hysteroscopy to rule out polyp as source  Offered IUD placement or ablation at the time of hysteroscopy D&C  We discussed benefits, risks and alternatives of both and patient would like to place Mirena IUD at the time of surgery  Patient consented for EUA, hysteroscopy, D&C, Mirena IUD

## 2020-02-28 NOTE — PROGRESS NOTES
Assessment/Plan:      Abnormal uterine bleeding  41 yo with new onset AUB    Bleeding initially improved with aygestin, returned and feels like cycle now  EMB benign  US with thickened lining, 25 mm  Pap 2017 Neg NILM  Recommend repeat sampling with hysteroscopy to rule out polyp as source  Offered IUD placement or ablation at the time of hysteroscopy D&C  We discussed benefits, risks and alternatives of both and patient would like to place Mirena IUD at the time of surgery  Patient consented for EUA, hysteroscopy, D&C, Mirena IUD  Cyst of right ovary  US in 1/2020 with incidental finding of 5 cm bilobed cyst on right ovary  Will repeat US now  If cyst persistent will plan for coinciding laparoscopy and removal of abnormal structure  If ovary will plan for salpingo/oophorectomy, if tube will plan to leave ovary  Patient prefers removal of entire abnormal structure over cystectomy given risks of cyst rupture if cyst on final path not benign  Diagnoses and all orders for this visit:    Cyst of right ovary  -     US pelvis complete w transvaginal; Future    Abnormal uterine bleeding          Subjective:      Patient ID: Quentin Sands is a 40 y o  female  40 here for follow up of AUB  The patient reports she had a normal cycle in the start of December that never stopped  It has waxed in waned in flow since then  Some days she see very minimal to no bleeding other days she will experience a gush intermittently  She occasionally passes golf ball sized clots, one at at time but possibly multiple times through the day  She denies lightheadedness or dizziness  Prior to this she has had very regular cycles since an episode of a missed period a year or two ago  She denies post coital bleeding  No other menopausal symptoms  She underwent EMB 2/7/2020 and started aygestin following  She initially had her bleeding stop but it has started back up and is heavy again   She is here to discuss next steps in management  The following portions of the patient's history were reviewed and updated as appropriate: allergies, current medications, past family history, past medical history, past social history, past surgical history and problem list     Review of Systems   Constitutional: Negative for chills and fever  Respiratory: Negative for shortness of breath  Cardiovascular: Negative for chest pain  Gastrointestinal: Negative for abdominal pain, constipation, diarrhea and nausea  Genitourinary: Positive for vaginal bleeding  Negative for dyspareunia, dysuria, frequency, urgency, vaginal discharge and vaginal pain  Objective:      /76   Wt 93 9 kg (207 lb)   BMI 35 53 kg/m²          Physical Exam   Constitutional: She appears well-developed and well-nourished  HENT:   Head: Normocephalic and atraumatic  Eyes: EOM are normal    Neck: Normal range of motion  Cardiovascular: Normal rate and regular rhythm  Pulmonary/Chest: Effort normal  No respiratory distress  Abdominal: Soft  She exhibits no distension  There is no tenderness  Genitourinary: Uterus normal  There is no rash, tenderness, lesion or injury on the right labia  There is no rash, tenderness, lesion or injury on the left labia  Uterus is not tender  Cervix exhibits no discharge and no friability  Right adnexum displays no mass, no tenderness and no fullness  Left adnexum displays no mass, no tenderness and no fullness  There is bleeding in the vagina  No erythema or tenderness in the vagina  No signs of injury around the vagina  No vaginal discharge found     Genitourinary Comments: Bimanual limited by habitus but enlarged uterus or adnexal masses not appreciated

## 2020-02-28 NOTE — ASSESSMENT & PLAN NOTE
US in 1/2020 with incidental finding of 5 cm bilobed cyst on right ovary  Will repeat US now  If cyst persistent will plan for coinciding laparoscopy and removal of abnormal structure  If ovary will plan for salpingo/oophorectomy, if tube will plan to leave ovary  Patient prefers removal of entire abnormal structure over cystectomy given risks of cyst rupture if cyst on final path not benign  If US without cyst will proceed with hysteroscopy D&C IUD alone

## 2020-02-28 NOTE — H&P
Assessment/Plan:      Abnormal uterine bleeding  41 yo with new onset AUB    Bleeding initially improved with aygestin, returned and feels like cycle now  EMB benign  US with thickened lining, 25 mm  Pap 2017 Neg NILM  Recommend repeat sampling with hysteroscopy to rule out polyp as source  Offered IUD placement or ablation at the time of hysteroscopy D&C  We discussed benefits, risks and alternatives of both and patient would like to place Mirena IUD at the time of surgery  Patient consented for EUA, hysteroscopy, D&C, Mirena IUD  Cyst of right ovary  US in 1/2020 with incidental finding of 5 cm bilobed cyst on right ovary  Will repeat US now  If cyst persistent will plan for coinciding laparoscopy and removal of abnormal structure  If ovary will plan for salpingo/oophorectomy, if tube will plan to leave ovary  Patient prefers removal of entire abnormal structure over cystectomy given risks of cyst rupture if cyst on final path not benign  If US without cyst will proceed with hysteroscopy D&C IUD alone  Diagnoses and all orders for this visit:    Cyst of right ovary  -     US pelvis complete w transvaginal; Future    Abnormal uterine bleeding          Subjective:      Patient ID: Andreas Ayala is a 40 y o  female  40 here for follow up of AUB  The patient reports she had a normal cycle in the start of December that never stopped  It has waxed in waned in flow since then  Some days she see very minimal to no bleeding other days she will experience a gush intermittently  She occasionally passes golf ball sized clots, one at at time but possibly multiple times through the day  She denies lightheadedness or dizziness  Prior to this she has had very regular cycles since an episode of a missed period a year or two ago  She denies post coital bleeding  No other menopausal symptoms  She underwent EMB 2/7/2020 and started aygestin following   She initially had her bleeding stop but it has started back up and is heavy again  She is here to discuss next steps in management  The following portions of the patient's history were reviewed and updated as appropriate: allergies, current medications, past family history, past medical history, past social history, past surgical history and problem list     Review of Systems   Constitutional: Negative for chills and fever  Respiratory: Negative for shortness of breath  Cardiovascular: Negative for chest pain  Gastrointestinal: Negative for abdominal pain, constipation, diarrhea and nausea  Genitourinary: Positive for vaginal bleeding  Negative for dyspareunia, dysuria, frequency, urgency, vaginal discharge and vaginal pain  Objective:      /76   Wt 93 9 kg (207 lb)   BMI 35 53 kg/m²          Physical Exam   Constitutional: She appears well-developed and well-nourished  HENT:   Head: Normocephalic and atraumatic  Eyes: EOM are normal    Neck: Normal range of motion  Cardiovascular: Normal rate and regular rhythm  Pulmonary/Chest: Effort normal  No respiratory distress  Abdominal: Soft  She exhibits no distension  There is no tenderness  Genitourinary: Uterus normal  There is no rash, tenderness, lesion or injury on the right labia  There is no rash, tenderness, lesion or injury on the left labia  Uterus is not tender  Cervix exhibits no discharge and no friability  Right adnexum displays no mass, no tenderness and no fullness  Left adnexum displays no mass, no tenderness and no fullness  There is bleeding in the vagina  No erythema or tenderness in the vagina  No signs of injury around the vagina  No vaginal discharge found     Genitourinary Comments: Bimanual limited by habitus but enlarged uterus or adnexal masses not appreciated

## 2020-03-02 PROBLEM — N93.9 ABNORMAL UTERINE BLEEDING (AUB): Status: ACTIVE | Noted: 2020-03-02

## 2020-03-02 PROBLEM — N83.209 CYST OF OVARY: Status: ACTIVE | Noted: 2020-03-02

## 2020-03-06 ENCOUNTER — HOSPITAL ENCOUNTER (OUTPATIENT)
Dept: RADIOLOGY | Facility: MEDICAL CENTER | Age: 45
Discharge: HOME | End: 2020-03-06
Payer: COMMERCIAL

## 2020-03-06 DIAGNOSIS — N83.201 CYST OF RIGHT OVARY: ICD-10-CM

## 2020-03-06 PROCEDURE — 76830 TRANSVAGINAL US NON-OB: CPT

## 2020-03-06 PROCEDURE — 76856 US EXAM PELVIC COMPLETE: CPT

## 2020-03-06 NOTE — TELEPHONE ENCOUNTER
Pt said she is having heavy bleeding again, and had her us today, is there anything else she can do for the bleeding?

## 2020-03-09 ENCOUNTER — TELEPHONE (OUTPATIENT)
Dept: OBGYN CLINIC | Facility: CLINIC | Age: 45
End: 2020-03-09

## 2020-03-09 ENCOUNTER — TELEPHONE (OUTPATIENT)
Dept: OBGYN CLINIC | Facility: MEDICAL CENTER | Age: 45
End: 2020-03-09

## 2020-03-09 NOTE — TELEPHONE ENCOUNTER
[facility] [address] [facility] [facility]  Will wait till u/s report  Note for work ok    Am awaiting u/s result

## 2020-03-09 NOTE — TELEPHONE ENCOUNTER
Pt needs a note for work - does have an approval from [facility],   Just saying pt having medical prob and will be out of work today    Address to Stephen Neely  - fax # [phone]  Thank you

## 2020-03-09 NOTE — TELEPHONE ENCOUNTER
Called patient to review US results  Overall similar to prior  Lining thinner  Right cyst resolved  Will cancel laparoscopic part of surgery  Having heavier bleeding and already informed to increase aygestin to 10 mg daily  To call back for alternative med if no improvement in subsequent days

## 2020-03-09 NOTE — TELEPHONE ENCOUNTER
I called reading room and am having her u/s read - hopefully today  Pt had prior u/s 1/27 - thickened lining and ovarian cyst   Was put on 5 mg aygestin daily on 2/27  She has never stopped bleeding  Had slowed for few days and past week or more using about 10 pads/day  !!   This past Sat ( 18 hr period) used 9 pads, 6 tampons  Clots  Is that heavy now - did not go to work  Wants a note off from work also  Today she developed pain in r side  Is today using heavy tampon / hr     She is currently on 5 aygestin /day

## 2020-03-13 ENCOUNTER — APPOINTMENT (OUTPATIENT)
Dept: LAB | Age: 45
End: 2020-03-13
Payer: COMMERCIAL

## 2020-03-13 ENCOUNTER — TELEPHONE (OUTPATIENT)
Dept: OBGYN CLINIC | Facility: CLINIC | Age: 45
End: 2020-03-13

## 2020-03-13 DIAGNOSIS — D62 ANEMIA DUE TO ACUTE BLOOD LOSS: Primary | ICD-10-CM

## 2020-03-13 DIAGNOSIS — Z01.818 PRE-OP TESTING: ICD-10-CM

## 2020-03-13 DIAGNOSIS — E78.00 HYPERCHOLESTEROLEMIA: ICD-10-CM

## 2020-03-13 DIAGNOSIS — Z11.4 ENCOUNTER FOR SCREENING FOR HIV: ICD-10-CM

## 2020-03-13 LAB
ABO GROUP BLD: NORMAL
ANION GAP SERPL CALCULATED.3IONS-SCNC: 5 MMOL/L (ref 4–13)
BLD GP AB SCN SERPL QL: NEGATIVE
BUN SERPL-MCNC: 14 MG/DL (ref 5–25)
CALCIUM SERPL-MCNC: 9.1 MG/DL (ref 8.3–10.1)
CHLORIDE SERPL-SCNC: 112 MMOL/L (ref 100–108)
CO2 SERPL-SCNC: 24 MMOL/L (ref 21–32)
CREAT SERPL-MCNC: 0.85 MG/DL (ref 0.6–1.3)
ERYTHROCYTE [DISTWIDTH] IN BLOOD BY AUTOMATED COUNT: 12.8 % (ref 11.6–15.1)
GFR SERPL CREATININE-BSD FRML MDRD: 84 ML/MIN/1.73SQ M
GLUCOSE P FAST SERPL-MCNC: 109 MG/DL (ref 65–99)
HCT VFR BLD AUTO: 26.8 % (ref 34.8–46.1)
HGB BLD-MCNC: 8 G/DL (ref 11.5–15.4)
MCH RBC QN AUTO: 25.7 PG (ref 26.8–34.3)
MCHC RBC AUTO-ENTMCNC: 29.9 G/DL (ref 31.4–37.4)
MCV RBC AUTO: 86 FL (ref 82–98)
PLATELET # BLD AUTO: 264 THOUSANDS/UL (ref 149–390)
PMV BLD AUTO: 10 FL (ref 8.9–12.7)
POTASSIUM SERPL-SCNC: 4.3 MMOL/L (ref 3.5–5.3)
RBC # BLD AUTO: 3.11 MILLION/UL (ref 3.81–5.12)
RH BLD: POSITIVE
SODIUM SERPL-SCNC: 141 MMOL/L (ref 136–145)
SPECIMEN EXPIRATION DATE: NORMAL
WBC # BLD AUTO: 5.32 THOUSAND/UL (ref 4.31–10.16)

## 2020-03-13 PROCEDURE — 86900 BLOOD TYPING SEROLOGIC ABO: CPT

## 2020-03-13 PROCEDURE — 80048 BASIC METABOLIC PNL TOTAL CA: CPT

## 2020-03-13 PROCEDURE — 36415 COLL VENOUS BLD VENIPUNCTURE: CPT

## 2020-03-13 PROCEDURE — 86901 BLOOD TYPING SEROLOGIC RH(D): CPT

## 2020-03-13 PROCEDURE — 86850 RBC ANTIBODY SCREEN: CPT

## 2020-03-13 PROCEDURE — 85027 COMPLETE CBC AUTOMATED: CPT

## 2020-03-13 NOTE — TELEPHONE ENCOUNTER
Please let patient know her recent bleeding episodes have made her anemic  I would advise starting BID iron pre and in her post op recovery, rx sent  Thank you

## 2020-03-13 NOTE — TELEPHONE ENCOUNTER
Pt contacted and advised as directed  Pt stated she will  iron and take as directed today  I advised pt orange juice or vit c will help her body better absorb the iron  Pt was grateful for the information  Pt has no questions or concerns end of call

## 2020-03-17 ENCOUNTER — TELEPHONE (OUTPATIENT)
Dept: OBGYN CLINIC | Facility: CLINIC | Age: 45
End: 2020-03-17

## 2020-03-17 DIAGNOSIS — R93.89 THICKENED ENDOMETRIUM: ICD-10-CM

## 2020-03-17 NOTE — TELEPHONE ENCOUNTER
Patient needs a refill of her medication  Patient states that Dr Mildred Kruse recently changed her dosage and now the pharmacy is giving her a hard time refilling it

## 2020-03-29 DIAGNOSIS — R93.89 THICKENED ENDOMETRIUM: ICD-10-CM

## 2020-04-12 DIAGNOSIS — R93.89 THICKENED ENDOMETRIUM: ICD-10-CM

## 2020-04-30 DIAGNOSIS — F41.9 ANXIETY AND DEPRESSION: ICD-10-CM

## 2020-04-30 DIAGNOSIS — L65.9 HAIR LOSS: ICD-10-CM

## 2020-04-30 DIAGNOSIS — F32.A ANXIETY AND DEPRESSION: ICD-10-CM

## 2020-04-30 RX ORDER — ESCITALOPRAM OXALATE 10 MG/1
10 TABLET ORAL
Qty: 90 TABLET | Refills: 0 | Status: SHIPPED | OUTPATIENT
Start: 2020-04-30 | End: 2020-08-03

## 2020-05-05 ENCOUNTER — ANESTHESIA EVENT (OUTPATIENT)
Dept: PERIOP | Facility: AMBULARY SURGERY CENTER | Age: 45
End: 2020-05-05
Payer: COMMERCIAL

## 2020-05-08 DIAGNOSIS — Z11.59 SCREENING FOR VIRAL DISEASE: ICD-10-CM

## 2020-05-08 PROCEDURE — U0003 INFECTIOUS AGENT DETECTION BY NUCLEIC ACID (DNA OR RNA); SEVERE ACUTE RESPIRATORY SYNDROME CORONAVIRUS 2 (SARS-COV-2) (CORONAVIRUS DISEASE [COVID-19]), AMPLIFIED PROBE TECHNIQUE, MAKING USE OF HIGH THROUGHPUT TECHNOLOGIES AS DESCRIBED BY CMS-2020-01-R: HCPCS

## 2020-05-09 LAB — SARS-COV-2 RNA SPEC QL NAA+PROBE: NOT DETECTED

## 2020-05-13 DIAGNOSIS — R93.89 THICKENED ENDOMETRIUM: ICD-10-CM

## 2020-05-13 DIAGNOSIS — D62 ANEMIA DUE TO ACUTE BLOOD LOSS: ICD-10-CM

## 2020-05-13 RX ORDER — FERROUS FUM/VIT C/B12-IF/FOLIC 110-0.5MG
CAPSULE ORAL
Qty: 60 CAPSULE | Refills: 1 | Status: ON HOLD | OUTPATIENT
Start: 2020-05-13 | End: 2020-05-15 | Stop reason: ALTCHOICE

## 2020-05-15 ENCOUNTER — HOSPITAL ENCOUNTER (OUTPATIENT)
Facility: AMBULARY SURGERY CENTER | Age: 45
Setting detail: OUTPATIENT SURGERY
Discharge: HOME | End: 2020-05-15
Attending: STUDENT IN AN ORGANIZED HEALTH CARE EDUCATION/TRAINING PROGRAM | Admitting: STUDENT IN AN ORGANIZED HEALTH CARE EDUCATION/TRAINING PROGRAM
Payer: COMMERCIAL

## 2020-05-15 ENCOUNTER — ANESTHESIA (OUTPATIENT)
Dept: PERIOP | Facility: AMBULARY SURGERY CENTER | Age: 45
End: 2020-05-15
Payer: COMMERCIAL

## 2020-05-15 VITALS
RESPIRATION RATE: 16 BRPM | HEART RATE: 84 BPM | SYSTOLIC BLOOD PRESSURE: 112 MMHG | OXYGEN SATURATION: 96 % | BODY MASS INDEX: 35.34 KG/M2 | DIASTOLIC BLOOD PRESSURE: 70 MMHG | WEIGHT: 207 LBS | HEIGHT: 64 IN | TEMPERATURE: 97.1 F

## 2020-05-15 DIAGNOSIS — N93.9 ABNORMAL UTERINE BLEEDING (AUB): ICD-10-CM

## 2020-05-15 PROBLEM — Z97.5 IUD (INTRAUTERINE DEVICE) IN PLACE: Status: ACTIVE | Noted: 2020-05-15

## 2020-05-15 PROCEDURE — 58558 HYSTEROSCOPY BIOPSY: CPT | Performed by: STUDENT IN AN ORGANIZED HEALTH CARE EDUCATION/TRAINING PROGRAM

## 2020-05-15 PROCEDURE — 58300 INSERT INTRAUTERINE DEVICE: CPT | Performed by: STUDENT IN AN ORGANIZED HEALTH CARE EDUCATION/TRAINING PROGRAM

## 2020-05-15 PROCEDURE — 88305 TISSUE EXAM BY PATHOLOGIST: CPT | Performed by: PATHOLOGY

## 2020-05-15 RX ORDER — LIDOCAINE HYDROCHLORIDE 10 MG/ML
0.5 INJECTION, SOLUTION EPIDURAL; INFILTRATION; INTRACAUDAL; PERINEURAL ONCE AS NEEDED
Status: DISCONTINUED | OUTPATIENT
Start: 2020-05-15 | End: 2020-05-15

## 2020-05-15 RX ORDER — SODIUM CHLORIDE, SODIUM LACTATE, POTASSIUM CHLORIDE, CALCIUM CHLORIDE 600; 310; 30; 20 MG/100ML; MG/100ML; MG/100ML; MG/100ML
125 INJECTION, SOLUTION INTRAVENOUS CONTINUOUS
Status: DISCONTINUED | OUTPATIENT
Start: 2020-05-15 | End: 2020-05-15

## 2020-05-15 RX ORDER — PROMETHAZINE HYDROCHLORIDE 25 MG/ML
25 INJECTION, SOLUTION INTRAMUSCULAR; INTRAVENOUS ONCE AS NEEDED
Status: DISCONTINUED | OUTPATIENT
Start: 2020-05-15 | End: 2020-05-15 | Stop reason: HOSPADM

## 2020-05-15 RX ORDER — PROPOFOL 10 MG/ML
INJECTION, EMULSION INTRAVENOUS AS NEEDED
Status: DISCONTINUED | OUTPATIENT
Start: 2020-05-15 | End: 2020-05-15 | Stop reason: SURG

## 2020-05-15 RX ORDER — FENTANYL CITRATE/PF 50 MCG/ML
25 SYRINGE (ML) INJECTION
Status: DISCONTINUED | OUTPATIENT
Start: 2020-05-15 | End: 2020-05-15 | Stop reason: HOSPADM

## 2020-05-15 RX ORDER — ONDANSETRON 2 MG/ML
4 INJECTION INTRAMUSCULAR; INTRAVENOUS ONCE AS NEEDED
Status: DISCONTINUED | OUTPATIENT
Start: 2020-05-15 | End: 2020-05-15 | Stop reason: HOSPADM

## 2020-05-15 RX ORDER — LIDOCAINE HYDROCHLORIDE 10 MG/ML
INJECTION, SOLUTION EPIDURAL; INFILTRATION; INTRACAUDAL; PERINEURAL AS NEEDED
Status: DISCONTINUED | OUTPATIENT
Start: 2020-05-15 | End: 2020-05-15 | Stop reason: SURG

## 2020-05-15 RX ORDER — DEXAMETHASONE SODIUM PHOSPHATE 10 MG/ML
INJECTION, SOLUTION INTRAMUSCULAR; INTRAVENOUS AS NEEDED
Status: DISCONTINUED | OUTPATIENT
Start: 2020-05-15 | End: 2020-05-15 | Stop reason: SURG

## 2020-05-15 RX ORDER — HYDROMORPHONE HCL/PF 1 MG/ML
0.5 SYRINGE (ML) INJECTION
Status: DISCONTINUED | OUTPATIENT
Start: 2020-05-15 | End: 2020-05-15 | Stop reason: HOSPADM

## 2020-05-15 RX ORDER — ACETAMINOPHEN 325 MG/1
650 TABLET ORAL EVERY 6 HOURS PRN
Status: DISCONTINUED | OUTPATIENT
Start: 2020-05-15 | End: 2020-05-15 | Stop reason: HOSPADM

## 2020-05-15 RX ORDER — MAGNESIUM HYDROXIDE 1200 MG/15ML
LIQUID ORAL AS NEEDED
Status: DISCONTINUED | OUTPATIENT
Start: 2020-05-15 | End: 2020-05-15 | Stop reason: HOSPADM

## 2020-05-15 RX ORDER — ONDANSETRON 2 MG/ML
INJECTION INTRAMUSCULAR; INTRAVENOUS AS NEEDED
Status: DISCONTINUED | OUTPATIENT
Start: 2020-05-15 | End: 2020-05-15 | Stop reason: SURG

## 2020-05-15 RX ORDER — MIDAZOLAM HYDROCHLORIDE 2 MG/2ML
INJECTION, SOLUTION INTRAMUSCULAR; INTRAVENOUS AS NEEDED
Status: DISCONTINUED | OUTPATIENT
Start: 2020-05-15 | End: 2020-05-15 | Stop reason: SURG

## 2020-05-15 RX ORDER — ONDANSETRON 2 MG/ML
4 INJECTION INTRAMUSCULAR; INTRAVENOUS EVERY 6 HOURS PRN
Status: DISCONTINUED | OUTPATIENT
Start: 2020-05-15 | End: 2020-05-15 | Stop reason: HOSPADM

## 2020-05-15 RX ORDER — DIPHENHYDRAMINE HYDROCHLORIDE 50 MG/ML
INJECTION INTRAMUSCULAR; INTRAVENOUS AS NEEDED
Status: DISCONTINUED | OUTPATIENT
Start: 2020-05-15 | End: 2020-05-15 | Stop reason: SURG

## 2020-05-15 RX ORDER — KETOROLAC TROMETHAMINE 30 MG/ML
INJECTION, SOLUTION INTRAMUSCULAR; INTRAVENOUS AS NEEDED
Status: DISCONTINUED | OUTPATIENT
Start: 2020-05-15 | End: 2020-05-15 | Stop reason: SURG

## 2020-05-15 RX ORDER — FENTANYL CITRATE 50 UG/ML
INJECTION, SOLUTION INTRAMUSCULAR; INTRAVENOUS AS NEEDED
Status: DISCONTINUED | OUTPATIENT
Start: 2020-05-15 | End: 2020-05-15 | Stop reason: SURG

## 2020-05-15 RX ADMIN — PROPOFOL 200 MG: 10 INJECTION, EMULSION INTRAVENOUS at 12:30

## 2020-05-15 RX ADMIN — KETOROLAC TROMETHAMINE 30 MG: 30 INJECTION, SOLUTION INTRAMUSCULAR at 12:54

## 2020-05-15 RX ADMIN — LIDOCAINE HYDROCHLORIDE 50 MG: 10 INJECTION, SOLUTION EPIDURAL; INFILTRATION; INTRACAUDAL; PERINEURAL at 12:30

## 2020-05-15 RX ADMIN — FENTANYL CITRATE 25 MCG: 50 INJECTION, SOLUTION INTRAMUSCULAR; INTRAVENOUS at 12:36

## 2020-05-15 RX ADMIN — FENTANYL CITRATE 25 MCG: 50 INJECTION, SOLUTION INTRAMUSCULAR; INTRAVENOUS at 12:46

## 2020-05-15 RX ADMIN — FENTANYL CITRATE 25 MCG: 50 INJECTION, SOLUTION INTRAMUSCULAR; INTRAVENOUS at 12:30

## 2020-05-15 RX ADMIN — ONDANSETRON 4 MG: 2 INJECTION INTRAMUSCULAR; INTRAVENOUS at 12:27

## 2020-05-15 RX ADMIN — DIPHENHYDRAMINE HYDROCHLORIDE 12.5 MG: 50 INJECTION, SOLUTION INTRAMUSCULAR; INTRAVENOUS at 12:45

## 2020-05-15 RX ADMIN — FENTANYL CITRATE 25 MCG: 50 INJECTION, SOLUTION INTRAMUSCULAR; INTRAVENOUS at 12:40

## 2020-05-15 RX ADMIN — SODIUM CHLORIDE, SODIUM LACTATE, POTASSIUM CHLORIDE, AND CALCIUM CHLORIDE: .6; .31; .03; .02 INJECTION, SOLUTION INTRAVENOUS at 12:25

## 2020-05-15 RX ADMIN — MIDAZOLAM HYDROCHLORIDE 2 MG: 1 INJECTION, SOLUTION INTRAMUSCULAR; INTRAVENOUS at 12:25

## 2020-05-15 RX ADMIN — DEXAMETHASONE SODIUM PHOSPHATE 8 MG: 10 INJECTION, SOLUTION INTRAMUSCULAR; INTRAVENOUS at 12:32

## 2020-05-19 ENCOUNTER — TELEPHONE (OUTPATIENT)
Dept: OBGYN CLINIC | Facility: CLINIC | Age: 45
End: 2020-05-19

## 2020-05-19 DIAGNOSIS — N93.9 ABNORMAL UTERINE BLEEDING: Primary | ICD-10-CM

## 2020-06-12 ENCOUNTER — OFFICE VISIT (OUTPATIENT)
Dept: OBGYN CLINIC | Facility: MEDICAL CENTER | Age: 45
End: 2020-06-12

## 2020-06-12 VITALS
BODY MASS INDEX: 34.49 KG/M2 | DIASTOLIC BLOOD PRESSURE: 80 MMHG | WEIGHT: 202 LBS | SYSTOLIC BLOOD PRESSURE: 138 MMHG | HEIGHT: 64 IN

## 2020-06-12 DIAGNOSIS — Z97.5 IUD (INTRAUTERINE DEVICE) IN PLACE: Primary | ICD-10-CM

## 2020-06-12 PROBLEM — N83.209 CYST OF OVARY: Status: RESOLVED | Noted: 2020-03-02 | Resolved: 2020-06-12

## 2020-06-12 PROBLEM — N83.201 CYST OF RIGHT OVARY: Status: RESOLVED | Noted: 2020-02-28 | Resolved: 2020-06-12

## 2020-06-12 PROCEDURE — 99024 POSTOP FOLLOW-UP VISIT: CPT | Performed by: STUDENT IN AN ORGANIZED HEALTH CARE EDUCATION/TRAINING PROGRAM

## 2020-06-12 PROCEDURE — 3008F BODY MASS INDEX DOCD: CPT | Performed by: STUDENT IN AN ORGANIZED HEALTH CARE EDUCATION/TRAINING PROGRAM

## 2020-07-31 DIAGNOSIS — L65.9 HAIR LOSS: ICD-10-CM

## 2020-07-31 DIAGNOSIS — F41.9 ANXIETY AND DEPRESSION: ICD-10-CM

## 2020-07-31 DIAGNOSIS — F32.A ANXIETY AND DEPRESSION: ICD-10-CM

## 2020-08-03 RX ORDER — ESCITALOPRAM OXALATE 10 MG/1
TABLET ORAL
Qty: 90 TABLET | Refills: 0 | Status: SHIPPED | OUTPATIENT
Start: 2020-08-03 | End: 2020-09-25 | Stop reason: SDUPTHER

## 2020-09-11 ENCOUNTER — OFFICE VISIT (OUTPATIENT)
Dept: INTERNAL MEDICINE CLINIC | Facility: CLINIC | Age: 45
End: 2020-09-11
Payer: COMMERCIAL

## 2020-09-11 VITALS
WEIGHT: 200.2 LBS | HEIGHT: 64 IN | HEART RATE: 78 BPM | DIASTOLIC BLOOD PRESSURE: 80 MMHG | BODY MASS INDEX: 34.18 KG/M2 | SYSTOLIC BLOOD PRESSURE: 122 MMHG | TEMPERATURE: 97.3 F | OXYGEN SATURATION: 98 %

## 2020-09-11 DIAGNOSIS — M54.42 ACUTE LEFT-SIDED LOW BACK PAIN WITH LEFT-SIDED SCIATICA: Primary | ICD-10-CM

## 2020-09-11 PROCEDURE — 99213 OFFICE O/P EST LOW 20 MIN: CPT | Performed by: INTERNAL MEDICINE

## 2020-09-11 RX ORDER — CYCLOBENZAPRINE HCL 5 MG
5 TABLET ORAL EVERY 8 HOURS PRN
Qty: 30 TABLET | Refills: 0 | Status: SHIPPED | OUTPATIENT
Start: 2020-09-11 | End: 2020-09-24 | Stop reason: SDUPTHER

## 2020-09-11 RX ORDER — MELOXICAM 7.5 MG/1
TABLET ORAL
Qty: 30 TABLET | Refills: 0 | Status: SHIPPED | OUTPATIENT
Start: 2020-09-11 | End: 2020-09-24 | Stop reason: SDUPTHER

## 2020-09-11 NOTE — PROGRESS NOTES
Assessment/Plan:    No problem-specific Assessment & Plan notes found for this encounter  Diagnoses and all orders for this visit:    Acute left-sided low back pain with left-sided sciatica  -     cyclobenzaprine (FLEXERIL) 5 mg tablet; Take 1 tablet (5 mg total) by mouth every 8 (eight) hours as needed for muscle spasms  -     meloxicam (MOBIC) 7 5 mg tablet; Take 1-2 tablets daily as needed for pain  Do not take with other NSAIDs (ibuprofen, naproxen)          BMI Counseling: Body mass index is 34 36 kg/m²  The BMI is above normal  Nutrition recommendations include decreasing portion sizes, encouraging healthy choices of fruits and vegetables, decreasing fast food intake, consuming healthier snacks, limiting drinks that contain sugar, moderation in carbohydrate intake, increasing intake of lean protein, reducing intake of saturated and trans fat and reducing intake of cholesterol  Exercise recommendations include moderate physical activity 150 minutes/week and exercising 3-5 times per week  No pharmacotherapy was ordered  Patient referred to PCP due to patient being overweight  Subjective:      Patient ID: Magalis Calero is a 40 y o  female  Chief Complaint   Patient presents with    Back Pain     Patient is here c/o lower back pain and pain on the lateral side of the left leg  Pain has been going on for three weeks  Pain gets worse at night time  45-year-old female with a past medical history of lumbar surgery (x2) is seen today with acute left-sided low back pain with left-sided sciatica of 3 weeks duration  Pain is worse with changing position from sitting to standing and is also worse when lying down at night  She denies any recent trauma or falls  Pain is different from previous low back pain  Back Pain   This is a new problem  The current episode started 1 to 4 weeks ago  The problem occurs daily  The problem is unchanged  The pain is present in the lumbar spine   The quality of the pain is described as cramping  The pain radiates to the left thigh  The pain is at a severity of 8/10  The pain is moderate  The pain is worse during the night  The symptoms are aggravated by position, twisting and standing  Stiffness is present all day  Pertinent negatives include no abdominal pain, chest pain, dysuria, fever, headaches, numbness or weakness  She has tried NSAIDs for the symptoms  The treatment provided mild relief  The following portions of the patient's history were reviewed and updated as appropriate: allergies, current medications, past family history, past medical history, past social history, past surgical history and problem list     Review of Systems   Constitutional: Negative for activity change, appetite change, chills, diaphoresis, fatigue and fever  HENT: Negative for congestion, postnasal drip, rhinorrhea, sinus pressure, sinus pain, sneezing and sore throat  Eyes: Negative for visual disturbance  Respiratory: Negative for apnea, cough, choking, chest tightness, shortness of breath and wheezing  Cardiovascular: Negative for chest pain, palpitations and leg swelling  Gastrointestinal: Negative for abdominal distention, abdominal pain, anal bleeding, blood in stool, constipation, diarrhea, nausea and vomiting  Endocrine: Negative for cold intolerance and heat intolerance  Genitourinary: Negative for difficulty urinating, dysuria and hematuria  Musculoskeletal: Positive for back pain  Skin: Negative  Neurological: Negative for dizziness, weakness, light-headedness, numbness and headaches  Hematological: Negative for adenopathy  Psychiatric/Behavioral: Negative for agitation, sleep disturbance and suicidal ideas  All other systems reviewed and are negative          Past Medical History:   Diagnosis Date    Back disorder     Depression     PONV (postoperative nausea and vomiting)          Current Outpatient Medications:    cholecalciferol (VITAMIN D3) 1,000 units tablet, Take 1,000 Units by mouth daily, Disp: , Rfl:     escitalopram (LEXAPRO) 10 mg tablet, TAKE 1 TABLET BY MOUTH DAILY AT BEDTIME, Disp: 90 tablet, Rfl: 0    levonorgestrel (MIRENA) 20 MCG/24HR IUD, 1 each by Intrauterine route once, Disp: , Rfl:     multivitamin-minerals (CENTRUM ADULTS) tablet, Take 1 tablet by mouth daily, Disp: , Rfl:     norethindrone (AYGESTIN) 5 mg tablet, Take 2 tablets (10 mg total) by mouth daily, Disp: 60 tablet, Rfl: 3    cyclobenzaprine (FLEXERIL) 5 mg tablet, Take 1 tablet (5 mg total) by mouth every 8 (eight) hours as needed for muscle spasms, Disp: 30 tablet, Rfl: 0    meloxicam (MOBIC) 7 5 mg tablet, Take 1-2 tablets daily as needed for pain  Do not take with other NSAIDs (ibuprofen, naproxen), Disp: 30 tablet, Rfl: 0    Allergies   Allergen Reactions    Amoxicillin Rash       Social History   Past Surgical History:   Procedure Laterality Date    BACK SURGERY      Lumbar( L3)     SECTION  [id number],[id number]    X2    CHOLECYSTECTOMY      ENDOMETRIAL BIOPSY      EXAMINATION UNDER ANESTHESIA N/A 5/15/2020    Procedure: EXAM UNDER ANESTHESIA (EUA);   Surgeon: Chey Candelaria MD;  [facility];  Service: Gynecology    INDUCED       times 2    AK HYSTEROSCOPY,W/ENDO BX N/A 5/15/2020    Procedure: DILATATION AND CURETTAGE (D&C) WITH HYSTEROSCOPY;  Surgeon: Chey Candelaria MD;  [facility];  Service: Gynecology    AK INSERT INTRAUTERINE DEVICE N/A 5/15/2020    Procedure: Mirena insertion;  Surgeon: Chey Candelaria MD;  [facility];  Service: Gynecology    TUBAL LIGATION       Family History   Problem Relation Age of Onset    Diabetes Mother     Hypertension Mother     Anxiety disorder Family         symptom    Other Family         breast disorders    Heart disease Other         cardiac disorder    Heart disease Other         cardiac disorder    Depression Father     Depression Sister        Objective:  /80 (BP Location: Left arm, Patient Position: Sitting, Cuff Size: Extra-Large)   Pulse 78   Temp (!) 97 3 °F (36 3 °C) (Temporal)   Ht 5' 4" (1 626 m)   Wt 90 8 kg (200 lb 3 2 oz)   SpO2 98%   BMI 34 36 kg/m²     No results found for this or any previous visit (from the past 1344 hour(s))  Physical Exam  Vitals signs and nursing note reviewed  Constitutional:       General: She is not in acute distress  Appearance: She is well-developed  She is not diaphoretic  HENT:      Head: Normocephalic and atraumatic  Eyes:      General:         Right eye: No discharge  Left eye: No discharge  Conjunctiva/sclera: Conjunctivae normal       Pupils: Pupils are equal, round, and reactive to light  Neck:      Musculoskeletal: Normal range of motion and neck supple  Thyroid: No thyromegaly  Vascular: No JVD  Cardiovascular:      Rate and Rhythm: Normal rate and regular rhythm  Heart sounds: Normal heart sounds  No murmur  No friction rub  No gallop  Pulmonary:      Effort: Pulmonary effort is normal  No respiratory distress  Breath sounds: Normal breath sounds  No wheezing or rales  Chest:      Chest wall: No tenderness  Abdominal:      General: There is no distension  Palpations: Abdomen is soft  Tenderness: There is no abdominal tenderness  Musculoskeletal: Normal range of motion  General: No deformity  Lumbar back: She exhibits tenderness, pain and spasm  Back:    Lymphadenopathy:      Cervical: No cervical adenopathy  Skin:     General: Skin is warm and dry  Coloration: Skin is not pale  Findings: No erythema or rash  Neurological:      Mental Status: She is alert and oriented to person, place, and time  Cranial Nerves: No cranial nerve deficit  Coordination: Coordination normal    Psychiatric:         Behavior: Behavior normal          Thought Content:  Thought content normal          Judgment: Judgment normal

## 2020-09-24 DIAGNOSIS — N93.9 ABNORMAL UTERINE BLEEDING: ICD-10-CM

## 2020-09-24 DIAGNOSIS — M54.42 ACUTE LEFT-SIDED LOW BACK PAIN WITH LEFT-SIDED SCIATICA: ICD-10-CM

## 2020-09-24 RX ORDER — MELOXICAM 7.5 MG/1
TABLET ORAL
Qty: 30 TABLET | Refills: 0 | Status: SHIPPED | OUTPATIENT
Start: 2020-09-24 | End: 2020-10-13 | Stop reason: SDUPTHER

## 2020-09-24 RX ORDER — CYCLOBENZAPRINE HCL 5 MG
5 TABLET ORAL EVERY 8 HOURS PRN
Qty: 30 TABLET | Refills: 0 | Status: SHIPPED | OUTPATIENT
Start: 2020-09-24 | End: 2020-10-13 | Stop reason: SDUPTHER

## 2020-09-25 DIAGNOSIS — L65.9 HAIR LOSS: ICD-10-CM

## 2020-09-25 DIAGNOSIS — F32.A ANXIETY AND DEPRESSION: ICD-10-CM

## 2020-09-25 DIAGNOSIS — F41.9 ANXIETY AND DEPRESSION: ICD-10-CM

## 2020-09-25 RX ORDER — ESCITALOPRAM OXALATE 10 MG/1
10 TABLET ORAL
Qty: 90 TABLET | Refills: 0 | Status: SHIPPED | OUTPATIENT
Start: 2020-09-25 | End: 2020-12-10 | Stop reason: SDUPTHER

## 2020-10-09 ENCOUNTER — LAB (OUTPATIENT)
Dept: LAB | Facility: IMAGING CENTER | Age: 45
End: 2020-10-09
Payer: COMMERCIAL

## 2020-10-09 ENCOUNTER — TRANSCRIBE ORDERS (OUTPATIENT)
Dept: ADMINISTRATIVE | Facility: HOSPITAL | Age: 45
End: 2020-10-09

## 2020-10-09 DIAGNOSIS — E78.00 PURE HYPERCHOLESTEROLEMIA: Primary | ICD-10-CM

## 2020-10-09 DIAGNOSIS — E78.00 PURE HYPERCHOLESTEROLEMIA: ICD-10-CM

## 2020-10-09 LAB
CHOLEST SERPL-MCNC: 321 MG/DL (ref 50–200)
HDLC SERPL-MCNC: 35 MG/DL
LDLC SERPL CALC-MCNC: 242 MG/DL (ref 0–100)
NONHDLC SERPL-MCNC: 286 MG/DL
TRIGL SERPL-MCNC: 222 MG/DL

## 2020-10-09 PROCEDURE — 80061 LIPID PANEL: CPT

## 2020-10-09 PROCEDURE — 36415 COLL VENOUS BLD VENIPUNCTURE: CPT

## 2020-10-09 PROCEDURE — 87389 HIV-1 AG W/HIV-1&-2 AB AG IA: CPT

## 2020-10-12 LAB — HIV 1+2 AB+HIV1 P24 AG SERPL QL IA: NORMAL

## 2020-10-13 ENCOUNTER — OFFICE VISIT (OUTPATIENT)
Dept: INTERNAL MEDICINE CLINIC | Age: 45
End: 2020-10-13
Payer: COMMERCIAL

## 2020-10-13 VITALS
HEIGHT: 66 IN | TEMPERATURE: 98.8 F | HEART RATE: 88 BPM | SYSTOLIC BLOOD PRESSURE: 120 MMHG | WEIGHT: 198.2 LBS | BODY MASS INDEX: 31.85 KG/M2 | OXYGEN SATURATION: 98 % | DIASTOLIC BLOOD PRESSURE: 82 MMHG

## 2020-10-13 DIAGNOSIS — F41.9 ANXIETY AND DEPRESSION: Primary | ICD-10-CM

## 2020-10-13 DIAGNOSIS — F32.A ANXIETY AND DEPRESSION: Primary | ICD-10-CM

## 2020-10-13 DIAGNOSIS — E78.00 HYPERCHOLESTEROLEMIA: ICD-10-CM

## 2020-10-13 DIAGNOSIS — M54.42 ACUTE LEFT-SIDED LOW BACK PAIN WITH LEFT-SIDED SCIATICA: ICD-10-CM

## 2020-10-13 PROCEDURE — 1036F TOBACCO NON-USER: CPT | Performed by: INTERNAL MEDICINE

## 2020-10-13 PROCEDURE — 99214 OFFICE O/P EST MOD 30 MIN: CPT | Performed by: INTERNAL MEDICINE

## 2020-10-13 PROCEDURE — 3725F SCREEN DEPRESSION PERFORMED: CPT | Performed by: INTERNAL MEDICINE

## 2020-10-13 RX ORDER — ROSUVASTATIN CALCIUM 10 MG/1
10 TABLET, COATED ORAL DAILY
Qty: 90 TABLET | Refills: 3 | Status: SHIPPED | OUTPATIENT
Start: 2020-10-13 | End: 2021-09-07 | Stop reason: SDUPTHER

## 2020-10-13 RX ORDER — METHYLPREDNISOLONE 4 MG/1
TABLET ORAL
Qty: 21 EACH | Refills: 0 | Status: SHIPPED | OUTPATIENT
Start: 2020-10-13 | End: 2020-12-10

## 2020-10-13 RX ORDER — MELOXICAM 7.5 MG/1
TABLET ORAL
Qty: 60 TABLET | Refills: 0 | Status: SHIPPED | OUTPATIENT
Start: 2020-10-13 | End: 2020-12-10

## 2020-10-13 RX ORDER — CYCLOBENZAPRINE HCL 5 MG
5 TABLET ORAL EVERY 8 HOURS PRN
Qty: 30 TABLET | Refills: 0 | Status: SHIPPED | OUTPATIENT
Start: 2020-10-13 | End: 2020-12-10 | Stop reason: SDUPTHER

## 2020-10-16 ENCOUNTER — APPOINTMENT (OUTPATIENT)
Dept: RADIOLOGY | Age: 45
End: 2020-10-16
Payer: COMMERCIAL

## 2020-10-16 DIAGNOSIS — M54.42 ACUTE LEFT-SIDED LOW BACK PAIN WITH LEFT-SIDED SCIATICA: ICD-10-CM

## 2020-10-16 PROCEDURE — 72110 X-RAY EXAM L-2 SPINE 4/>VWS: CPT

## 2020-10-23 ENCOUNTER — EVALUATION (OUTPATIENT)
Dept: PHYSICAL THERAPY | Age: 45
End: 2020-10-23
Payer: COMMERCIAL

## 2020-10-23 DIAGNOSIS — M54.42 ACUTE LEFT-SIDED LOW BACK PAIN WITH LEFT-SIDED SCIATICA: Primary | ICD-10-CM

## 2020-10-23 PROCEDURE — 97110 THERAPEUTIC EXERCISES: CPT | Performed by: PHYSICAL THERAPIST

## 2020-10-23 PROCEDURE — 97162 PT EVAL MOD COMPLEX 30 MIN: CPT | Performed by: PHYSICAL THERAPIST

## 2020-10-30 ENCOUNTER — OFFICE VISIT (OUTPATIENT)
Dept: PHYSICAL THERAPY | Age: 45
End: 2020-10-30
Payer: COMMERCIAL

## 2020-10-30 DIAGNOSIS — M54.42 ACUTE LEFT-SIDED LOW BACK PAIN WITH LEFT-SIDED SCIATICA: Primary | ICD-10-CM

## 2020-10-30 PROCEDURE — 97110 THERAPEUTIC EXERCISES: CPT | Performed by: PHYSICAL MEDICINE & REHABILITATION

## 2020-10-30 PROCEDURE — 97112 NEUROMUSCULAR REEDUCATION: CPT | Performed by: PHYSICAL MEDICINE & REHABILITATION

## 2020-11-04 DIAGNOSIS — M54.42 ACUTE LEFT-SIDED LOW BACK PAIN WITH LEFT-SIDED SCIATICA: ICD-10-CM

## 2020-11-04 RX ORDER — METHYLPREDNISOLONE 4 MG/1
TABLET ORAL
Qty: 21 EACH | Refills: 0 | Status: CANCELLED | OUTPATIENT
Start: 2020-11-04

## 2020-11-04 RX ORDER — MELOXICAM 7.5 MG/1
TABLET ORAL
Qty: 60 TABLET | Refills: 0 | Status: CANCELLED | OUTPATIENT
Start: 2020-11-04

## 2020-11-06 ENCOUNTER — OFFICE VISIT (OUTPATIENT)
Dept: PHYSICAL THERAPY | Age: 45
End: 2020-11-06
Payer: COMMERCIAL

## 2020-11-06 DIAGNOSIS — M54.42 ACUTE LEFT-SIDED LOW BACK PAIN WITH LEFT-SIDED SCIATICA: Primary | ICD-10-CM

## 2020-11-06 PROCEDURE — 97112 NEUROMUSCULAR REEDUCATION: CPT | Performed by: PHYSICAL THERAPIST

## 2020-11-06 PROCEDURE — 97140 MANUAL THERAPY 1/> REGIONS: CPT | Performed by: PHYSICAL THERAPIST

## 2020-11-06 PROCEDURE — 97110 THERAPEUTIC EXERCISES: CPT | Performed by: PHYSICAL THERAPIST

## 2020-11-12 ENCOUNTER — TELEPHONE (OUTPATIENT)
Dept: OBGYN CLINIC | Facility: CLINIC | Age: 45
End: 2020-11-12

## 2020-11-12 DIAGNOSIS — N93.9 ABNORMAL UTERINE BLEEDING (AUB): Primary | ICD-10-CM

## 2020-11-13 ENCOUNTER — OFFICE VISIT (OUTPATIENT)
Dept: PHYSICAL THERAPY | Age: 45
End: 2020-11-13
Payer: COMMERCIAL

## 2020-11-13 DIAGNOSIS — M54.42 ACUTE LEFT-SIDED LOW BACK PAIN WITH LEFT-SIDED SCIATICA: Primary | ICD-10-CM

## 2020-11-13 PROCEDURE — 97110 THERAPEUTIC EXERCISES: CPT | Performed by: PHYSICAL THERAPIST

## 2020-11-13 PROCEDURE — 97112 NEUROMUSCULAR REEDUCATION: CPT | Performed by: PHYSICAL THERAPIST

## 2020-11-13 PROCEDURE — 97140 MANUAL THERAPY 1/> REGIONS: CPT | Performed by: PHYSICAL THERAPIST

## 2020-11-13 RX ORDER — NORGESTIMATE AND ETHINYL ESTRADIOL 0.25-0.035
1 KIT ORAL DAILY
Qty: 56 TABLET | Refills: 4 | Status: SHIPPED | OUTPATIENT
Start: 2020-11-13 | End: 2020-12-07 | Stop reason: SDUPTHER

## 2020-12-07 ENCOUNTER — TELEPHONE (OUTPATIENT)
Dept: OBGYN CLINIC | Facility: CLINIC | Age: 45
End: 2020-12-07

## 2020-12-07 DIAGNOSIS — N93.9 ABNORMAL UTERINE BLEEDING (AUB): ICD-10-CM

## 2020-12-07 RX ORDER — NORGESTIMATE AND ETHINYL ESTRADIOL 0.25-0.035
1 KIT ORAL DAILY
Qty: 90 TABLET | Refills: 4 | Status: SHIPPED | OUTPATIENT
Start: 2020-12-07 | End: 2021-05-18

## 2020-12-10 ENCOUNTER — OFFICE VISIT (OUTPATIENT)
Dept: INTERNAL MEDICINE CLINIC | Age: 45
End: 2020-12-10
Payer: COMMERCIAL

## 2020-12-10 VITALS
TEMPERATURE: 98.1 F | DIASTOLIC BLOOD PRESSURE: 76 MMHG | SYSTOLIC BLOOD PRESSURE: 112 MMHG | BODY MASS INDEX: 33.15 KG/M2 | HEART RATE: 98 BPM | OXYGEN SATURATION: 98 % | HEIGHT: 65 IN | WEIGHT: 199 LBS

## 2020-12-10 DIAGNOSIS — L65.9 HAIR LOSS: ICD-10-CM

## 2020-12-10 DIAGNOSIS — M54.42 ACUTE LEFT-SIDED LOW BACK PAIN WITH LEFT-SIDED SCIATICA: Primary | ICD-10-CM

## 2020-12-10 DIAGNOSIS — F41.9 ANXIETY AND DEPRESSION: ICD-10-CM

## 2020-12-10 DIAGNOSIS — M54.10 RADICULAR PAIN OF LOWER EXTREMITY: ICD-10-CM

## 2020-12-10 DIAGNOSIS — F32.A ANXIETY AND DEPRESSION: ICD-10-CM

## 2020-12-10 PROCEDURE — 99214 OFFICE O/P EST MOD 30 MIN: CPT | Performed by: PHYSICIAN ASSISTANT

## 2020-12-10 PROCEDURE — 3008F BODY MASS INDEX DOCD: CPT | Performed by: INTERNAL MEDICINE

## 2020-12-10 RX ORDER — PREDNISONE 10 MG/1
TABLET ORAL
Qty: 40 TABLET | Refills: 0 | Status: SHIPPED | OUTPATIENT
Start: 2020-12-10 | End: 2021-03-02 | Stop reason: ALTCHOICE

## 2020-12-10 RX ORDER — ESCITALOPRAM OXALATE 10 MG/1
10 TABLET ORAL
Qty: 90 TABLET | Refills: 0 | Status: SHIPPED | OUTPATIENT
Start: 2020-12-10 | End: 2021-03-02 | Stop reason: SDUPTHER

## 2020-12-10 RX ORDER — CYCLOBENZAPRINE HCL 5 MG
5 TABLET ORAL EVERY 8 HOURS PRN
Qty: 30 TABLET | Refills: 0 | Status: SHIPPED | OUTPATIENT
Start: 2020-12-10 | End: 2021-03-02

## 2020-12-18 ENCOUNTER — HOSPITAL ENCOUNTER (OUTPATIENT)
Dept: MRI IMAGING | Facility: HOSPITAL | Age: 45
Discharge: HOME | End: 2020-12-18
Payer: COMMERCIAL

## 2020-12-18 DIAGNOSIS — M54.42 ACUTE LEFT-SIDED LOW BACK PAIN WITH LEFT-SIDED SCIATICA: ICD-10-CM

## 2020-12-18 DIAGNOSIS — M51.16 LUMBAR DISC HERNIATION WITH RADICULOPATHY: Primary | ICD-10-CM

## 2020-12-18 DIAGNOSIS — M51.26 HERNIATED NUCLEUS PULPOSUS, L3-4 LEFT: ICD-10-CM

## 2020-12-18 DIAGNOSIS — M54.10 RADICULAR PAIN OF LOWER EXTREMITY: ICD-10-CM

## 2020-12-18 PROCEDURE — 72148 MRI LUMBAR SPINE W/O DYE: CPT

## 2020-12-18 PROCEDURE — G1004 CDSM NDSC: HCPCS

## 2020-12-22 DIAGNOSIS — M54.42 ACUTE LEFT-SIDED LOW BACK PAIN WITH LEFT-SIDED SCIATICA: Primary | ICD-10-CM

## 2021-01-22 ENCOUNTER — CONSULT (OUTPATIENT)
Dept: PAIN MEDICINE | Facility: CLINIC | Age: 46
End: 2021-01-22
Payer: COMMERCIAL

## 2021-01-22 VITALS
BODY MASS INDEX: 34.16 KG/M2 | HEIGHT: 65 IN | DIASTOLIC BLOOD PRESSURE: 83 MMHG | HEART RATE: 89 BPM | TEMPERATURE: 98.1 F | SYSTOLIC BLOOD PRESSURE: 131 MMHG | WEIGHT: 205 LBS

## 2021-01-22 DIAGNOSIS — M51.26 LUMBAR DISC HERNIATION: ICD-10-CM

## 2021-01-22 DIAGNOSIS — M54.16 LUMBAR RADICULOPATHY: Primary | ICD-10-CM

## 2021-01-22 DIAGNOSIS — M47.816 LUMBAR SPONDYLOSIS: ICD-10-CM

## 2021-01-22 PROCEDURE — 99244 OFF/OP CNSLTJ NEW/EST MOD 40: CPT | Performed by: ANESTHESIOLOGY

## 2021-01-22 PROCEDURE — 1036F TOBACCO NON-USER: CPT | Performed by: ANESTHESIOLOGY

## 2021-01-22 PROCEDURE — 3008F BODY MASS INDEX DOCD: CPT | Performed by: ANESTHESIOLOGY

## 2021-01-22 RX ORDER — GABAPENTIN 300 MG/1
300 CAPSULE ORAL 3 TIMES DAILY
Qty: 90 CAPSULE | Refills: 1 | Status: SHIPPED | OUTPATIENT
Start: 2021-01-22 | End: 2021-03-16 | Stop reason: SDUPTHER

## 2021-01-22 NOTE — PATIENT INSTRUCTIONS
Epidural Steroid Injection   WHAT YOU NEED TO KNOW:   What do I need to know about an epidural steroid injection (JEFFREY)? An JEFFREY is a procedure to inject steroid medicine into the epidural space  The epidural space is between your spinal cord and vertebrae  Steroids reduce inflammation and fluid buildup in your spine that may be causing pain  You may be given pain medicine along with the steroids  How do I prepare for an JEFFREY? Your healthcare provider will talk to you about how to prepare for your procedure  He or she will tell you what medicines to take or not take on the day of your procedure  You may need to stop taking blood thinners or other medicines several days before your procedure  You may need to adjust any diabetes medicine you take on the day of your procedure  Steroid medicine can increase your blood sugar level  Arrange for someone to drive you home when you are discharged  What will happen during an JEFFREY? · You will be given medicine to numb the procedure area  You will be awake for the procedure, but you will not feel pain  You may also be given medicine to help you relax  Contrast liquid will be used to help your healthcare provider see the area better  Tell the healthcare provider if you have ever had an allergic reaction to contrast liquid  · Your healthcare provider may place the needle into your neck area, middle of your back, or tailbone area  He may inject the medicine next to the nerves that are causing your pain  He may instead inject the medicine into a larger area of the epidural space  This helps the medicine spread to more nerves  Your healthcare provider will use a fluoroscope to help guide the needle to the right place  A fluoroscope is a type of x-ray  After the procedure, a bandage will be placed over the injection site to prevent infection  What will happen after an JEFFREY? You will have a bandage over the injection site to prevent infection   Your healthcare provider will tell you when you can bathe and any activity guidelines  You will be able to go home  What are the risks of an JEFFREY? You may have temporary or permanent nerve damage or paralysis  You may have bleeding or develop a serious infection, such as meningitis (swelling of the brain coverings)  An abscess may also develop  An abscess is a pus-filled area under the skin  You may need surgery to fix the abscess  You may have a seizure, anxiety, or trouble sleeping  If you are a man, you may have temporary erectile dysfunction (not able to have an erection)  CARE AGREEMENT:   You have the right to help plan your care  Learn about your health condition and how it may be treated  Discuss treatment options with your healthcare providers to decide what care you want to receive  You always have the right to refuse treatment  The above information is an  only  It is not intended as medical advice for individual conditions or treatments  Talk to your doctor, nurse or pharmacist before following any medical regimen to see if it is safe and effective for you  © Copyright [address] Information is for End User's use only and may not be sold, redistributed or otherwise used for commercial purposes   All illustrations and images included in CareNotes® are the copyrighted property of [employer]  or [address] [place] [address] [employer]

## 2021-01-22 NOTE — PROGRESS NOTES
Assessment  1  Lumbar radiculopathy    2  Lumbar disc herniation    3  Lumbar spondylosis        Plan    42-year-old female with a history of multiple previous lumbar surgeries, referred by Dr Nancie Hall, presenting for initial consultation regarding a 4 month history of lumbosacral back pain that radiates into the lateral and medial aspect of the left lower extremity to the knee without any trauma or inciting event  MRI of the lumbar spine shows a disc herniation at L3-4 causing significant central and lateral recess stenosis  Moderate bilateral foraminal stenosis also noted at L5-S1 secondary to disc osteophyte complex with mild recess stenosis noted  She does have a lumbar eyes the S1 segment  She did note significant relief with a course of oral steroids, however pain quickly returned back to baseline  She has also found some relief with cyclobenzaprine and meloxicam   Tylenol provides minimal relief  Physical therapy has provided variable relief  The patient's symptoms are consistent with lumbar radiculopathy stemming from disc herniation and resultant stenosis at L3-4       1  I will schedule the patient for left L3 and L4 TFESI to reduce the inflammatory component of her pain  2  I will initiate gabapentin 300 mg q h s  and titrate up to t i d  for neuropathic complaints  The patient was apprised the most common side effects of gabapentin and she was given a titration schedule at today's office visit  3  Patient will continue with her home exercise program  4  I will follow up the patient in 4 weeks      Complete risks and benefits including bleeding, infection, tissue reaction, nerve injury and allergic reaction were discussed  The approach was demonstrated using models and literature was provided  Verbal and written consent was obtained  My impressions and treatment recommendations were discussed in detail with the patient who verbalized understanding and had no further questions  Discharge instructions were provided  I personally saw and examined the patient and I agree with the above discussed plan of care  No orders of the defined types were placed in this encounter  No orders of the defined types were placed in this encounter  History of Present Illness    Jasmina Pfeiffer is a 39 y o  female with a history of multiple previous lumbar surgeries, referred by Dr Quiana Johnson, presenting for initial consultation regarding a 4 month history of lumbosacral back pain that radiates into the lateral and medial aspect of the left lower extremity to the knee without any trauma or inciting event  She denies any numbness, paresthesias, or weakness of the left leg  She denies any right lower extremity symptoms, bladder bowel incontinence, or saddle anesthesia  MRI of the lumbar spine shows a disc herniation at L3-4 causing significant central and lateral recess stenosis  Moderate bilateral foraminal stenosis also noted at L5-S1 secondary to disc osteophyte complex with mild recess stenosis noted  She does have a lumbar eyes the S1 segment  She did note significant relief with a course of oral steroids, however pain quickly returned back to baseline  She has also found some relief with cyclobenzaprine and meloxicam   Tylenol provides minimal relief  Physical therapy has provided variable relief  The patient rates her pain a 7/10 on the pain is nearly constant  The pain is worse in the morning and described as shooting, sharp, and pressure like  The pain is decreased with lying down, sitting, and relaxation  The pain is increased with bending, walking, exercise, and sneezing  Other than as stated above, the patient denies any interval changes in medications, medical condition, mental condition, symptoms, or allergies since the last office visit            I have personally reviewed and/or updated the patient's past medical history, past surgical history, family history, social history, current medications, allergies, and vital signs today  Review of Systems   Constitutional: Negative for fever and unexpected weight change  HENT: Negative for trouble swallowing  Eyes: Negative for visual disturbance  Respiratory: Negative for shortness of breath and wheezing  Cardiovascular: Negative for chest pain and palpitations  Gastrointestinal: Negative for constipation, diarrhea, nausea and vomiting  Endocrine: Negative for cold intolerance, heat intolerance and polydipsia  Genitourinary: Negative for difficulty urinating and frequency  Musculoskeletal: Negative for arthralgias, gait problem, joint swelling and myalgias  Skin: Negative for rash  Neurological: Negative for dizziness, seizures, syncope, weakness and headaches  Hematological: Does not bruise/bleed easily  Psychiatric/Behavioral: Negative for dysphoric mood  All other systems reviewed and are negative  Patient Active Problem List   Diagnosis    Hair loss    Anxiety and depression    BMI 33 0-33 9,adult    Vitamin D deficiency    Seasonal allergies    Abnormal uterine bleeding    Hypercholesterolemia    Abnormal uterine bleeding (AUB)    IUD (intrauterine device) in place    Acute left-sided low back pain with left-sided sciatica       Past Medical History:   Diagnosis Date    Back disorder     Depression     PONV (postoperative nausea and vomiting)        Past Surgical History:   Procedure Laterality Date    BACK SURGERY      Lumbar( L3)     SECTION  4927,[id number]    X2    CHOLECYSTECTOMY      ENDOMETRIAL BIOPSY      EXAMINATION UNDER ANESTHESIA N/A 5/15/2020    Procedure: EXAM UNDER ANESTHESIA (EUA);   Surgeon: Lennart Ahumada, MD;  [facility];  Service: Gynecology    INDUCED       times 2    ND HYSTEROSCOPY,W/ENDO BX N/A 5/15/2020    Procedure: DILATATION AND CURETTAGE (D&C) WITH HYSTEROSCOPY;  Surgeon: Lennart Ahumada, MD;  [facility]; Service: Gynecology    AR INSERT INTRAUTERINE DEVICE N/A 5/15/2020    Procedure: Mirena insertion;  Surgeon: Joel Downing MD;  [facility];  Service: Gynecology    TUBAL LIGATION      WISDOM TOOTH EXTRACTION  10/05/2020       Family History   Problem Relation Age of Onset    Diabetes Mother     Hypertension Mother     Anxiety disorder Family         symptom    Other Family         breast disorders    Heart disease Other         cardiac disorder    Heart disease Other         cardiac disorder    Depression Father     Depression Sister        Social History     Occupational History    Occupation: currently works full-time   Tobacco Use    Smoking status: Never Smoker    Smokeless tobacco: Never Used   Substance and Sexual Activity    Alcohol use: Not Currently    Drug use: No    Sexual activity: Yes     Birth control/protection: Surgical, Female Sterilization     Comment: Tubal ligations       Current Outpatient Medications on File Prior to Visit   Medication Sig    escitalopram (LEXAPRO) 10 mg tablet Take 1 tablet (10 mg total) by mouth daily at bedtime    levonorgestrel (MIRENA) 20 MCG/24HR IUD 1 each by Intrauterine route once    multivitamin-minerals (CENTRUM ADULTS) tablet Take 1 tablet by mouth daily    rosuvastatin (CRESTOR) 10 MG tablet Take 1 tablet (10 mg total) by mouth daily    cyclobenzaprine (FLEXERIL) 5 mg tablet Take 1 tablet (5 mg total) by mouth every 8 (eight) hours as needed for muscle spasms (Patient not taking: Reported on 1/22/2021)    norgestimate-ethinyl estradiol (ORTHO-CYCLEN) 0 25-35 MG-MCG per tablet Take 1 tablet by mouth daily Skip placebo pills, 2 packs will last 6 wks (Patient not taking: Reported on 12/10/2020)    predniSONE 10 mg tablet 4 tabs daily for 4 days then 3 tabs daily x 4 days then 2 tabs daily x 4 days then 1 tab daily for 4 days   (Patient not taking: Reported on 1/22/2021)     No current facility-administered medications on file prior to visit  Allergies   Allergen Reactions    Amoxicillin Rash       Physical Exam    /83   Pulse 89   Temp 98 1 °F (36 7 °C) (Oral)   Ht 5' 5" (1 651 m)   Wt 93 kg (205 lb)   BMI 34 11 kg/m²     Constitutional: normal, well developed, well nourished, alert, in no distress and non-toxic and no overt pain behavior  Eyes: anicteric  HEENT: grossly intact  Neck: supple, symmetric, trachea midline and no masses   Pulmonary:even and unlabored  Cardiovascular:No edema or pitting edema present  Skin:Normal without rashes or lesions and well hydrated  Psychiatric:Mood and affect appropriate  Neurologic:Cranial Nerves II-XII grossly intact  Musculoskeletal:antalgic  Gait  Left lumbar paraspinals tender to palpation  Well-healed lumbar incision noted  Left patellar reflex 0/4  Right patellar reflex and bilateral Achilles reflexes 2/4  Bilateral lower extremity strength 5/5 in all muscle groups  Sensation intact to light touch in L3 through S1 dermatomes bilaterally  Positive straight leg raise on the left and negative on the right  Negative Robb's test bilaterally  Imaging        PACS Images     Show images for MRI lumbar spine wo contrast   Study Result    MRI LUMBAR SPINE WITHOUT CONTRAST     INDICATION: M[age]: Lumbago with sciatica, left side  M[age]: Radiculopathy, site unspecified  Acute left-sided low back pain with left-sided sciatica      COMPARISON:  None      TECHNIQUE:  Sagittal T1, sagittal T2, sagittal inversion recovery, axial T1 and axial T2, coronal T2     IMAGE QUALITY:  Diagnostic     FINDINGS:     VERTEBRAL BODIES:  There is a transitional lumbosacral junction  S1 is a transitional level and is lumbarized  There is a hypoplastic but complete disc space at S1-2  Slight retrolisthesis L3 on 4  Endplate degenerative marrow signal L5-S1      SACRUM:  Normal signal within the sacrum   No evidence of insufficiency or stress fracture      DISTAL CORD AND CONUS:  Normal size and signal within the distal cord and conus      PARASPINAL SOFT TISSUES:  Paraspinal soft tissues are unremarkable      LOWER THORACIC DISC SPACES:  Normal disc height and signal   No disc herniation, canal stenosis or foraminal narrowing      LUMBAR DISC SPACES:     L1-L2:  No significant central or foraminal narrowing      L2-L3:  Moderate facet hypertrophy  No significant central or foraminal narrowing      L3-L4:  Inferiorly extruded left paramedian disc herniation descends 1 cm below the native disc margin  Severe left lateral recess stenosis is noted  Correlate for left L4 radiculopathy  Moderate central stenosis  Foramina are patent      L4-L5:  Small marginal osteophytes bilaterally and facet hypertrophy combined result in minimal foraminal narrowing  Central canal patent      L5-S1:  Severe facet hypertrophy  There is degenerative disc osteophyte complex with marginal osteophytes resulting in moderate bilateral foraminal narrowing and mild lateral recess stenosis      S1-S2: This is a transitional level  No significant central stenosis  Mild left foraminal narrowing  Moderate facet hypertrophy      IMPRESSION:     Inferiorly extruded left paramedian disc herniation L3-4 results in severe lateral recess stenosis  Correlate for left L4 radiculopathy  Moderate central stenosis also noted      Transitional lumbosacral junction noted with a lumbarized S1 level      Degenerative changes are seen elsewhere as detailed above         Workstation performed: [id number]           PACS Images     Show images for XR spine lumbar minimum 4 views non injury   Study Result    LUMBAR SPINE     INDICATION:   M[age]: Lumbago with sciatica, left side      COMPARISON:  None     VIEWS:  XR SPINE LUMBAR MINIMUM 4 VIEWS NON INJURY        FINDINGS:     There are 4 non rib bearing lumbar vertebral bodies  Hypoplastic 12th ribs    Sacralized transitional L5 with anomalous articulation     There is no evidence of acute fracture or destructive osseous lesion      L3-L4 slight retrolisthesis  Anatomic alignment otherwise  Lumbar lordosis straightening      L1-L2 tiny osteophytes  L2-L3 mild disc space narrowing, endplate eburnation and small osteophytes    L3-L4, L4-L5 moderate disc space narrowing, endplate eburnation      The pedicles appear intact      Soft tissues are unremarkable      IMPRESSION:     Lumbar lordosis straightening     Degenerative changes      No acute osseous abnormality        Workstation performed: [id number]

## 2021-01-25 ENCOUNTER — TELEPHONE (OUTPATIENT)
Dept: PAIN MEDICINE | Facility: CLINIC | Age: 46
End: 2021-01-25

## 2021-01-26 NOTE — TELEPHONE ENCOUNTER
Patient left MSG that she will be at work and unable to answer phone  But did say she can take an appointment for 2/10/21 first appointment of the day because she has off       I left MS giving all preprocedure instructions as well as confirming the date and time of the procedure     Patient contacted and scheduled for Left L3 and L4 TFESI  All pre procedure instructions were given to patient   Nothing to eat or drink for 1 hour prior  Loose fitting clothing   NSAIDS okay, Denies Anticoag's   Denies Antibx   Needs    Patient instructed to contact our office if becomes sick   Refrain from any vaccines 2 weeks before & 2 weeks after  Insurance auth received but is not a guarantee of payment per your insurance company's authorization disclaimer and it is your responsibility to verify your benefits   COVID -19 screening complete

## 2021-02-10 ENCOUNTER — HOSPITAL ENCOUNTER (OUTPATIENT)
Dept: RADIOLOGY | Facility: CLINIC | Age: 46
Discharge: HOME | End: 2021-02-10
Attending: ANESTHESIOLOGY
Payer: COMMERCIAL

## 2021-02-10 VITALS
DIASTOLIC BLOOD PRESSURE: 74 MMHG | HEART RATE: 79 BPM | OXYGEN SATURATION: 96 % | RESPIRATION RATE: 20 BRPM | SYSTOLIC BLOOD PRESSURE: 108 MMHG | TEMPERATURE: 97.7 F

## 2021-02-10 DIAGNOSIS — M54.16 LUMBAR RADICULOPATHY: ICD-10-CM

## 2021-02-10 PROCEDURE — 64484 NJX AA&/STRD TFRM EPI L/S EA: CPT | Performed by: ANESTHESIOLOGY

## 2021-02-10 PROCEDURE — 64483 NJX AA&/STRD TFRM EPI L/S 1: CPT | Performed by: ANESTHESIOLOGY

## 2021-02-10 RX ORDER — PAPAVERINE HCL 150 MG
20 CAPSULE, EXTENDED RELEASE ORAL ONCE
Status: COMPLETED | OUTPATIENT
Start: 2021-02-10 | End: 2021-02-10

## 2021-02-10 RX ORDER — LIDOCAINE HYDROCHLORIDE 10 MG/ML
5 INJECTION, SOLUTION EPIDURAL; INFILTRATION; INTRACAUDAL; PERINEURAL ONCE
Status: COMPLETED | OUTPATIENT
Start: 2021-02-10 | End: 2021-02-10

## 2021-02-10 RX ADMIN — IOHEXOL 1 ML: 300 INJECTION, SOLUTION INTRAVENOUS at 08:21

## 2021-02-10 RX ADMIN — DEXAMETHASONE SODIUM PHOSPHATE 20 MG: 10 INJECTION, SOLUTION INTRAMUSCULAR; INTRAVENOUS at 08:19

## 2021-02-10 RX ADMIN — LIDOCAINE HYDROCHLORIDE 3 ML: 10 INJECTION, SOLUTION EPIDURAL; INFILTRATION; INTRACAUDAL; PERINEURAL at 08:19

## 2021-02-10 RX ADMIN — LIDOCAINE HYDROCHLORIDE 2 ML: 20 INJECTION, SOLUTION EPIDURAL; INFILTRATION; INTRACAUDAL; PERINEURAL at 08:21

## 2021-02-10 NOTE — DISCHARGE INSTRUCTIONS
Epidural Steroid Injection   WHAT YOU NEED TO KNOW:   An epidural steroid injection (JEFFREY) is a procedure to inject steroid medicine into the epidural space  The epidural space is between your spinal cord and vertebrae  Steroids reduce inflammation and fluid buildup in your spine that may be causing pain  You may be given pain medicine along with the steroids  ACTIVITY  · Do not drive or operate machinery today  · No strenuous activity today - bending, lifting, etc   · You may resume normal activites starting tomorrow - start slowly and as tolerated  · You may shower today, but no tub baths or hot tubs  · You may have numbness for several hours from the local anesthetic  Please use caution and common sense, especially with weight-bearing activities  CARE OF THE INJECTION SITE  · If you have soreness or pain, apply ice to the area today (20 minutes on/20 minutes off)  · Starting tomorrow, you may use warm, moist heat or ice if needed  · You may have an increase or change in your discomfort for 36-48 hours after your treatment  · Apply ice and continue with any pain medication you have been prescribed  · Notify [facility] if you have any of the following: redness, drainage, swelling, headache, stiff neck or fever above 100°F     SPECIAL INSTRUCTIONS  · Our office will contact you in approximately 7 days for a progress report  MEDICATIONS  · Continue to take all routine medications  · Our office may have instructed you to hold some medications  If you have a problem specifically related to your procedure, please call our office at [phone]  Problems not related to your procedure should be directed to your primary care physician

## 2021-02-10 NOTE — H&P
History of Present Illness: The patient is a 39 y o  female who presents with complaints of Low back and leg pain  Patient Active Problem List   Diagnosis    Hair loss    Anxiety and depression    BMI 33 0-33 9,adult    Vitamin D deficiency    Seasonal allergies    Abnormal uterine bleeding    Hypercholesterolemia    Abnormal uterine bleeding (AUB)    IUD (intrauterine device) in place    Acute left-sided low back pain with left-sided sciatica    Lumbar radiculopathy    Lumbar disc herniation    Lumbar spondylosis       Past Medical History:   Diagnosis Date    Back disorder     Depression     PONV (postoperative nausea and vomiting)        Past Surgical History:   Procedure Laterality Date    BACK SURGERY      Lumbar( L3)     SECTION  3532,1238    X2    CHOLECYSTECTOMY      ENDOMETRIAL BIOPSY      EXAMINATION UNDER ANESTHESIA N/A 5/15/2020    Procedure: EXAM UNDER ANESTHESIA (EUA);   Surgeon: Lucinda Lau MD;  Location: [facility];  Service: Gynecology    INDUCED       times 2    ND HYSTEROSCOPY,W/ENDO BX N/A 5/15/2020    Procedure: DILATATION AND CURETTAGE (D&C) WITH HYSTEROSCOPY;  Surgeon: Lucinda Lau MD;  Location: [facility];  Service: Gynecology    ND INSERT INTRAUTERINE DEVICE N/A 5/15/2020    Procedure: Mirena insertion;  Surgeon: Lucinda Lau MD;  Location: [facility];  Service: Gynecology    TUBAL LIGATION      WISDOM TOOTH EXTRACTION  10/05/2020         Current Outpatient Medications:     cyclobenzaprine (FLEXERIL) 5 mg tablet, Take 1 tablet (5 mg total) by mouth every 8 (eight) hours as needed for muscle spasms (Patient not taking: Reported on 2021), Disp: 30 tablet, Rfl: 0    escitalopram (LEXAPRO) 10 mg tablet, Take 1 tablet (10 mg total) by mouth daily at bedtime, Disp: 90 tablet, Rfl: 0    gabapentin (NEURONTIN) 300 mg capsule, Take 1 capsule (300 mg total) by mouth 3 (three) times a day, Disp: 90 capsule, Rfl: 1   levonorgestrel (MIRENA) 20 MCG/24HR IUD, 1 each by Intrauterine route once, Disp: , Rfl:     multivitamin-minerals (CENTRUM ADULTS) tablet, Take 1 tablet by mouth daily, Disp: , Rfl:     norgestimate-ethinyl estradiol (ORTHO-CYCLEN) 0 25-35 MG-MCG per tablet, Take 1 tablet by mouth daily Skip placebo pills, 2 packs will last 6 wks, Disp: 90 tablet, Rfl: 4    predniSONE 10 mg tablet, 4 tabs daily for 4 days then 3 tabs daily x 4 days then 2 tabs daily x 4 days then 1 tab daily for 4 days  (Patient not taking: Reported on 1/22/2021), Disp: 40 tablet, Rfl: 0    rosuvastatin (CRESTOR) 10 MG tablet, Take 1 tablet (10 mg total) by mouth daily, Disp: 90 tablet, Rfl: 3    Allergies   Allergen Reactions    Amoxicillin Rash       Physical Exam:   Vitals:    02/10/21 0804   BP: 124/86   Pulse: 79   Resp: 20   Temp: 97 7 °F (36 5 °C)   SpO2: 98%     General: Awake, Alert, Oriented x 3, Mood and affect appropriate  Respiratory: Respirations even and unlabored  Cardiovascular: Peripheral pulses intact; no edema  Musculoskeletal Exam:   Left lumbar paraspinals tender to palpation  ASA Score: 2    Patient/Chart Verification  Patient ID Verified: Verbal  ID Band Applied: No  Consents Confirmed: Procedural  H&P( within 30 days) Verified: To be obtained in the Pre-Procedure area  Interval H&P(within 24 hr) Complete (required for Outpatients and Surgery Admit only): To be obtained in the Pre-Procedure area  Allergies Reviewed: Yes  Anticoag/NSAID held?: No  Currently on antibiotics?: No  Pregnancy denied?: Yes  Pre-op Lab/Test Results Available: N/A  Pregnancy Lab Collected: N/A comment    Assessment:   1   Lumbar radiculopathy        Plan: Left L3 and L4 TFESI

## 2021-02-17 ENCOUNTER — TELEPHONE (OUTPATIENT)
Dept: PAIN MEDICINE | Facility: CLINIC | Age: 46
End: 2021-02-17

## 2021-02-17 DIAGNOSIS — M79.18 MYOFASCIAL PAIN SYNDROME: Primary | ICD-10-CM

## 2021-02-23 ENCOUNTER — TELEPHONE (OUTPATIENT)
Dept: OBGYN CLINIC | Facility: CLINIC | Age: 46
End: 2021-02-23

## 2021-02-23 NOTE — TELEPHONE ENCOUNTER
Pt would like to leave a message with preeti about her medication and her bleeding please call  This number till 430pm [phone] #[phone] ask for prabhjot

## 2021-02-24 NOTE — TELEPHONE ENCOUNTER
I called as advised- [phone] #[phone] asked for prabhjot  Pt contacted and advised as directed  Pt agreed to stay on ortho to avoid more bleeding

## 2021-02-24 NOTE — TELEPHONE ENCOUNTER
Pt on continuous ortho cyclen and had iud  lmtcb    Pt has a mirena and has been on ortho cyclen now for 3 mos  Pt still has same dsch and brown bleeding daily  Pt has an appt with you 3/26  She wants to know if she should stay on pills till then  Has same dsch all the time - from 1st month on pill to 3rd month   Appt is to discuss poss hyster

## 2021-02-24 NOTE — TELEPHONE ENCOUNTER
Pt 2 week pain level is a 8/10 at 2:00 until bed stabbing pain in her left outside of leg  When sleeping when she moves she gets pain down left leg, and wakes her up  When she awakes in the morning she takes shower and feels pretty good until the 2:00 hr also gets pain on the inside of left knee      Pt # [phone]

## 2021-02-27 ENCOUNTER — NURSE TRIAGE (OUTPATIENT)
Dept: OTHER | Facility: OTHER | Age: 46
End: 2021-02-27

## 2021-02-27 ENCOUNTER — TRANSCRIBE ORDERS (OUTPATIENT)
Dept: ADMINISTRATIVE | Age: 46
End: 2021-02-27

## 2021-02-27 ENCOUNTER — TELEPHONE (OUTPATIENT)
Dept: OTHER | Facility: OTHER | Age: 46
End: 2021-02-27

## 2021-02-27 ENCOUNTER — APPOINTMENT (OUTPATIENT)
Dept: LAB | Age: 46
End: 2021-02-27
Payer: COMMERCIAL

## 2021-02-27 DIAGNOSIS — E78.00 PURE HYPERCHOLESTEROLEMIA: Primary | ICD-10-CM

## 2021-02-27 DIAGNOSIS — E78.00 PURE HYPERCHOLESTEROLEMIA: ICD-10-CM

## 2021-02-27 LAB
ALBUMIN SERPL BCP-MCNC: 3.5 G/DL (ref 3.5–5)
ALP SERPL-CCNC: 47 U/L (ref 46–116)
ALT SERPL W P-5'-P-CCNC: 42 U/L (ref 12–78)
ANION GAP SERPL CALCULATED.3IONS-SCNC: 4 MMOL/L (ref 4–13)
AST SERPL W P-5'-P-CCNC: 35 U/L (ref 5–45)
BILIRUB SERPL-MCNC: 0.4 MG/DL (ref 0.2–1)
BUN SERPL-MCNC: 15 MG/DL (ref 5–25)
CALCIUM SERPL-MCNC: 9.6 MG/DL (ref 8.3–10.1)
CHLORIDE SERPL-SCNC: 104 MMOL/L (ref 100–108)
CHOLEST SERPL-MCNC: 201 MG/DL (ref 50–200)
CK SERPL-CCNC: 86 U/L (ref 26–192)
CO2 SERPL-SCNC: 28 MMOL/L (ref 21–32)
CREAT SERPL-MCNC: 0.69 MG/DL (ref 0.6–1.3)
GFR SERPL CREATININE-BSD FRML MDRD: 105 ML/MIN/1.73SQ M
GLUCOSE SERPL-MCNC: 76 MG/DL (ref 65–140)
HDLC SERPL-MCNC: 63 MG/DL
LDLC SERPL CALC-MCNC: 103 MG/DL (ref 0–100)
NONHDLC SERPL-MCNC: 138 MG/DL
POTASSIUM SERPL-SCNC: 4.6 MMOL/L (ref 3.5–5.3)
PROT SERPL-MCNC: 7.5 G/DL (ref 6.4–8.2)
SODIUM SERPL-SCNC: 136 MMOL/L (ref 136–145)
TRIGL SERPL-MCNC: 176 MG/DL

## 2021-02-27 PROCEDURE — 80053 COMPREHEN METABOLIC PANEL: CPT

## 2021-02-27 PROCEDURE — 80061 LIPID PANEL: CPT

## 2021-02-27 PROCEDURE — 36415 COLL VENOUS BLD VENIPUNCTURE: CPT

## 2021-02-27 PROCEDURE — 82550 ASSAY OF CK (CPK): CPT

## 2021-02-27 NOTE — TELEPHONE ENCOUNTER
Regarding: Needs script for blood work  ----- Message from [employer] sent at 2/27/2021  8:38 AM EST -----  "I went to the lab this morning to get blood work done for my appt on Tuesday and there are no orders in my chart  The only day I can get them done is today   Can the blood work be ordered?"

## 2021-02-27 NOTE — TELEPHONE ENCOUNTER
Reason for Disposition   Question about upcoming scheduled test, no triage required and triager able to answer question    Answer Assessment - Initial Assessment Questions  1  REASON FOR CALL or QUESTION: "What is your reason for calling today?" or "How can I best help you?" or "What question do you have that I can help answer?"      Lab work not in the computer  RN couldn't find any information on lab tests requested relevant to Tuesday appointment  Patient then found order requisition sheet  Will take to the lab and present to them  RN instructed patient to call back if any further issues or ask  to page the oncall provider      Protocols used: INFORMATION ONLY CALL - NO TRIAGE-ADULT-

## 2021-03-01 DIAGNOSIS — M54.16 LUMBAR RADICULOPATHY: Primary | ICD-10-CM

## 2021-03-01 NOTE — TELEPHONE ENCOUNTER
Patient is scheduled for a repeat TFESI on 3/16/2021  She is currently taking Gabapentin TID with minimal relief, is there anything else you can prescribe that will help her until her next injection? She is scheduled for 3/16  She was taking a muscle relaxer in the past that provided relief at night?        Patient was contacted and scheduled for Left L3 and L4 TFESI   All pre procedure instructions were given to patient   Nothing to eat or drink for 1 hour prior  Loose fitting clothing   Denies Anti Coag's   NSAIDS okay, ASA okay  Denies Antibx   Needs    Patient instructed to continue all routine medications   Patient instructed to contact our office if becomes sick   Refrain from any vaccines 2 weeks before & 2 weeks after  Insurance auth received but is not a guarantee of payment per your insurance company's authorization disclaimer and it is your responsibility to verify your benefits   COVID -19 screening complete

## 2021-03-01 NOTE — TELEPHONE ENCOUNTER
If patient is taking 300mg TID, I can provide her with instructions no how to increase to 600mg TID if she'd like

## 2021-03-01 NOTE — TELEPHONE ENCOUNTER
Patient called returning procedure   call  Please be advise thank you        Please call patient back @ [phone]

## 2021-03-01 NOTE — TELEPHONE ENCOUNTER
Attempted to call the patient and left a detailed mom in regards to the previous inquiry  Encouraged the patient to CB to F/U

## 2021-03-02 ENCOUNTER — OFFICE VISIT (OUTPATIENT)
Dept: INTERNAL MEDICINE CLINIC | Facility: CLINIC | Age: 46
End: 2021-03-02
Payer: COMMERCIAL

## 2021-03-02 VITALS
BODY MASS INDEX: 34.16 KG/M2 | HEART RATE: 96 BPM | SYSTOLIC BLOOD PRESSURE: 114 MMHG | HEIGHT: 65 IN | OXYGEN SATURATION: 98 % | TEMPERATURE: 97.4 F | DIASTOLIC BLOOD PRESSURE: 74 MMHG | WEIGHT: 205 LBS

## 2021-03-02 DIAGNOSIS — E55.9 VITAMIN D DEFICIENCY: ICD-10-CM

## 2021-03-02 DIAGNOSIS — F32.A ANXIETY AND DEPRESSION: ICD-10-CM

## 2021-03-02 DIAGNOSIS — M54.42 ACUTE LEFT-SIDED LOW BACK PAIN WITH LEFT-SIDED SCIATICA: Primary | ICD-10-CM

## 2021-03-02 DIAGNOSIS — M54.16 LUMBAR RADICULOPATHY: ICD-10-CM

## 2021-03-02 DIAGNOSIS — L65.9 HAIR LOSS: ICD-10-CM

## 2021-03-02 DIAGNOSIS — E78.00 HYPERCHOLESTEROLEMIA: ICD-10-CM

## 2021-03-02 DIAGNOSIS — F41.9 ANXIETY AND DEPRESSION: ICD-10-CM

## 2021-03-02 PROCEDURE — 3725F SCREEN DEPRESSION PERFORMED: CPT | Performed by: INTERNAL MEDICINE

## 2021-03-02 PROCEDURE — 99214 OFFICE O/P EST MOD 30 MIN: CPT | Performed by: INTERNAL MEDICINE

## 2021-03-02 PROCEDURE — 1036F TOBACCO NON-USER: CPT | Performed by: INTERNAL MEDICINE

## 2021-03-02 RX ORDER — ESCITALOPRAM OXALATE 10 MG/1
10 TABLET ORAL
Qty: 90 TABLET | Refills: 1 | Status: SHIPPED | OUTPATIENT
Start: 2021-03-02 | End: 2021-09-07 | Stop reason: SDUPTHER

## 2021-03-02 NOTE — ASSESSMENT & PLAN NOTE
- Patient's lipid panel improved since starting crestor 10 mg  - Will continue current regimen and continue to monitor lipids at next appointment

## 2021-03-02 NOTE — ASSESSMENT & PLAN NOTE
Continue follow-up with spine and pain  Continue gabapentin 600 mg 3 times a day  Discussed close monitoring for side effects of gabapentin  Discussed home stretching exercises

## 2021-03-02 NOTE — PROGRESS NOTES
[facility] [address] [place] [address]  Standard Office Visit  Patient ID: Waneta Began    : 1975  Age/Gender: 39 y o  female     DATE: 3/2/2021      Assessment/Plan:    1  Acute left-sided low back pain with left-sided sciatica  Assessment & Plan:  - Patient is being followed by pain medicine and receiving corticosteroid injections  - Patient advised to take gabapentin 2 capsules 3 times a day but is complaining of some drowsiness      2  Hypercholesterolemia  Assessment & Plan:  - Patient's lipid panel improved since starting crestor 10 mg  - Will continue current regimen and continue to monitor lipids at next appointment      3  Vitamin D deficiency  Assessment & Plan:  - Patient taking multivitamin supplement      4  Lumbar radiculopathy  Assessment & Plan:  Continue follow-up with spine and pain  Continue gabapentin 600 mg 3 times a day  Discussed close monitoring for side effects of gabapentin  Discussed home stretching exercises  5  Anxiety and depression  Assessment & Plan:  Continue 10 mg lexapro daily  Orders:  -     escitalopram (LEXAPRO) 10 mg tablet; Take 1 tablet (10 mg total) by mouth daily at bedtime    6  Hair loss  -     escitalopram (LEXAPRO) 10 mg tablet; Take 1 tablet (10 mg total) by mouth daily at bedtime      Acute left-sided low back pain with left-sided sciatica  - Patient is being followed by pain medicine and receiving corticosteroid injections  - Patient advised to take gabapentin 2 capsules 3 times a day but is complaining of some drowsiness    Hypercholesterolemia  - Patient's lipid panel improved since starting crestor 10 mg  - Will continue current regimen and continue to monitor lipids at next appointment    Anxiety and depression  Continue 10 mg lexapro daily  Vitamin D deficiency  - Patient taking multivitamin supplement    Lumbar radiculopathy  Continue follow-up with spine and pain    Continue gabapentin 600 mg 3 times a day  Discussed close monitoring for side effects of gabapentin  Discussed home stretching exercises  Diagnoses and all orders for this visit:    Acute left-sided low back pain with left-sided sciatica    Hypercholesterolemia    Vitamin D deficiency    Lumbar radiculopathy    Anxiety and depression  -     escitalopram (LEXAPRO) 10 mg tablet; Take 1 tablet (10 mg total) by mouth daily at bedtime    Hair loss  -     escitalopram (LEXAPRO) 10 mg tablet; Take 1 tablet (10 mg total) by mouth daily at bedtime          BMI Counseling: Body mass index is 34 11 kg/m²  The BMI is above normal  Nutrition recommendations include decreasing portion sizes, encouraging healthy choices of fruits and vegetables, decreasing fast food intake, consuming healthier snacks, limiting drinks that contain sugar, moderation in carbohydrate intake, increasing intake of lean protein, reducing intake of saturated and trans fat and reducing intake of cholesterol  Exercise recommendations include moderate physical activity 150 minutes/week and exercising 3-5 times per week  No pharmacotherapy was ordered  Patient referred to PCP due to patient being overweight  Depression Screening and Follow-up Plan: Patient's depression screening was positive with a PHQ-2 score of 0  Their PHQ-9 score was 0  Clincally patient does not have depression  No treatment is required  Patient advised to follow-up with PCP for further management  Subjective:   No chief complaint on file  Nella Schaumann is a 39 y o  female who presents to the office on 3/2/2021 for     26-year-old female is seen today for follow-up of chronic conditions  Laboratory studies reviewed today, lipid panel shows significant improvement since starting simvastatin  CK and CMP are within acceptable range  She started following with pain medicine for her back pain   She has been told to increase her gabapentin dose, but expresses concerns due to it increasing her tiredness/fatigue levels  She was taking gabapentin 300 mg 3 times a day and was advised to increase to 600 mg 3 times a day  She recently received her COVID vaccination and experienced some swollen lymph nodes which have since subsided  Otherwise, she has no active complaints or concerns at this time  Hyperlipidemia  This is a chronic problem  The current episode started more than 1 year ago  The problem is controlled  Recent lipid tests were reviewed and are normal  Pertinent negatives include no chest pain or shortness of breath  Current antihyperlipidemic treatment includes statins  The current treatment provides moderate improvement of lipids  Compliance problems include adherence to diet and adherence to exercise  Back Pain  This is a chronic problem  The current episode started more than 1 year ago  The problem occurs every several days  The problem is unchanged  The pain is present in the lumbar spine  The pain radiates to the left knee  The pain is moderate  The symptoms are aggravated by position  Pertinent negatives include no abdominal pain, chest pain, dysuria, fever, headaches, numbness or weakness  Anxiety  Presents for follow-up visit  Patient reports no chest pain, dizziness, nausea, palpitations, shortness of breath or suicidal ideas  Symptoms occur rarely  The severity of symptoms is mild  The patient sleeps 8 hours per night  Compliance with medications is %  The following portions of the patient's history were reviewed and updated as appropriate: allergies, current medications, past family history, past medical history, past social history, past surgical history and problem list     Review of Systems   Constitutional: Positive for fatigue (due to gabapentin)  Negative for activity change, appetite change, chills, diaphoresis, fever and unexpected weight change     HENT: Negative for congestion, postnasal drip, rhinorrhea, sinus pressure, sinus pain, sneezing and sore throat  Eyes: Negative for visual disturbance  Respiratory: Negative for apnea, cough, choking, chest tightness, shortness of breath and wheezing  Cardiovascular: Negative for chest pain, palpitations and leg swelling  Gastrointestinal: Negative for abdominal distention, abdominal pain, anal bleeding, blood in stool, constipation, diarrhea, nausea and vomiting  Endocrine: Negative for cold intolerance, heat intolerance and polyuria  Genitourinary: Negative for difficulty urinating, dysuria and hematuria  Musculoskeletal: Positive for back pain (chronic low back pain) and joint swelling  Skin: Negative  Neurological: Negative for dizziness, weakness, light-headedness, numbness and headaches  Hematological: Negative for adenopathy  Psychiatric/Behavioral: Negative for agitation, sleep disturbance and suicidal ideas  All other systems reviewed and are negative  Patient Active Problem List   Diagnosis    Hair loss    Anxiety and depression    BMI 33 0-33 9,adult    Vitamin D deficiency    Seasonal allergies    Abnormal uterine bleeding    Hypercholesterolemia    Abnormal uterine bleeding (AUB)    IUD (intrauterine device) in place    Acute left-sided low back pain with left-sided sciatica    Lumbar radiculopathy    Lumbar disc herniation    Lumbar spondylosis       Past Medical History:   Diagnosis Date    Back disorder     Depression     PONV (postoperative nausea and vomiting)        Past Surgical History:   Procedure Laterality Date    BACK SURGERY      Lumbar( L3)     SECTION  0548,1123    X2    CHOLECYSTECTOMY      ENDOMETRIAL BIOPSY      EXAMINATION UNDER ANESTHESIA N/A 5/15/2020    Procedure: EXAM UNDER ANESTHESIA (EUA);   Surgeon: Alo Cervantes MD;  [facility];  Service: Gynecology    INDUCED       times 2    SC HYSTEROSCOPY,W/ENDO BX N/A 5/15/2020    Procedure: DILATATION AND CURETTAGE (D&C) WITH HYSTEROSCOPY;  Surgeon: Rosalia Mccormack MD;  [facility];  Service: Gynecology    AZ INSERT INTRAUTERINE DEVICE N/A 5/15/2020    Procedure: Mirena insertion;  Surgeon: Rosalia Mccormack MD;  [facility];  Service: Gynecology    TUBAL LIGATION      WISDOM TOOTH EXTRACTION  10/05/2020         Current Outpatient Medications:     escitalopram (LEXAPRO) 10 mg tablet, Take 1 tablet (10 mg total) by mouth daily at bedtime, Disp: 90 tablet, Rfl: 1    gabapentin (NEURONTIN) 300 mg capsule, Take 1 capsule (300 mg total) by mouth 3 (three) times a day (Patient taking differently: Take 600 mg by mouth 3 (three) times a day ), Disp: 90 capsule, Rfl: 1    levonorgestrel (MIRENA) 20 MCG/24HR IUD, 1 each by Intrauterine route once, Disp: , Rfl:     multivitamin-minerals (CENTRUM ADULTS) tablet, Take 1 tablet by mouth daily, Disp: , Rfl:     norgestimate-ethinyl estradiol (ORTHO-CYCLEN) 0 25-35 MG-MCG per tablet, Take 1 tablet by mouth daily Skip placebo pills, 2 packs will last 6 wks, Disp: 90 tablet, Rfl: 4    rosuvastatin (CRESTOR) 10 MG tablet, Take 1 tablet (10 mg total) by mouth daily, Disp: 90 tablet, Rfl: 3    Allergies   Allergen Reactions    Amoxicillin Rash       Social History     Socioeconomic History    Marital status: /Civil Union     Spouse name: None    Number of children: None    Years of education: None    Highest education level: None   Occupational History    Occupation: currently works full-time   Social Needs    Financial resource strain: None    Food insecurity     Worry: None     Inability: None    Transportation needs     Medical: None     Non-medical: None   Tobacco Use    Smoking status: Never Smoker    Smokeless tobacco: Never Used   Substance and Sexual Activity    Alcohol use: Not Currently    Drug use: No    Sexual activity: Yes     Birth control/protection: Surgical, Female Sterilization     Comment: Tubal ligations   Lifestyle    Physical activity Days per week: None     Minutes per session: None    Stress: None   Relationships    Social connections     Talks on phone: None     Gets together: None     Attends [employer] service: None     Active member of club or organization: None     Attends meetings of clubs or organizations: None     Relationship status: None    Intimate partner violence     Fear of current or ex partner: None     Emotionally abused: None     Physically abused: None     Forced sexual activity: None   Other Topics Concern    None   Social History Narrative    Always uses seat belt    Caffeine use    Drinks coffee    Exercise habits    Feels safe at home    Lives with spouse           Family History   Problem Relation Age of Onset    Diabetes Mother     Hypertension Mother     Anxiety disorder Family         symptom    Other Family         breast disorders    Heart disease Other         cardiac disorder    Heart disease Other         cardiac disorder    Depression Father     Depression Sister        PHQ-9 Depression Screening    PHQ-9:   Frequency of the following problems over the past two weeks:      Little interest or pleasure in doing things: 0 - not at all  Feeling down, depressed, or hopeless: 0 - not at all  Trouble falling or staying asleep, or sleeping too much: 0 - not at all  Feeling tired or having little energy: 0 - not at all  Poor appetite or overeatin - not at all  Feeling bad about yourself - or that you are a failure or have let yourself or your family down: 0 - not at all  Trouble concentrating on things, such as reading the newspaper or watching television: 0 - not at all  Moving or speaking so slowly that other people could have noticed   Or the opposite - being so fidgety or restless that you have been moving around a lot more than usual: 0 - not at all  Thoughts that you would be better off dead, or of hurting yourself in some way: 0 - not at all  PHQ-2 Score: 0  PHQ-9 Score: 0         Health Maintenance   Topic Date Due    Annual Physical  06/28/2020    Influenza Vaccine (1) 09/01/2020    BMI: Followup Plan  09/11/2021    MAMMOGRAM  11/20/2021    DTaP,Tdap,and Td Vaccines (2 - Td) 12/20/2021    BMI: Adult  03/02/2022    Depression Remission PHQ  03/02/2022    Cervical Cancer Screening  11/03/2022    HIV Screening  Completed    Pneumococcal Vaccine: Pediatrics (0 to 5 Years) and At-Risk Patients (6 to 59 Years)  Aged Out    HIB Vaccine  Aged Out    Hepatitis B Vaccine  Aged Out    IPV Vaccine  Aged Out    Hepatitis A Vaccine  Aged Out    Meningococcal ACWY Vaccine  Aged Out    HPV Vaccine  Aged [employer] History   Administered Date(s) Administered    Tdap 12/20/2011        Objective:  Vitals:    03/02/21 1430   BP: 114/74   BP Location: Left arm   Patient Position: Sitting   Cuff Size: Large   Pulse: 96   Temp: (!) 97 4 °F (36 3 °C)   SpO2: 98%   Weight: 93 kg (205 lb)   Height: 5' 5" (1 651 m)     Wt Readings from Last 3 Encounters:   03/02/21 93 kg (205 lb)   01/22/21 93 kg (205 lb)   12/10/20 90 3 kg (199 lb)     Body mass index is 34 11 kg/m²  No exam data present       Physical Exam  Vitals signs and nursing note reviewed  Constitutional:       General: She is not in acute distress  Appearance: Normal appearance  She is well-developed  She is not diaphoretic  HENT:      Head: Normocephalic and atraumatic  Eyes:      General:         Right eye: No discharge  Left eye: No discharge  Extraocular Movements: Extraocular movements intact  Conjunctiva/sclera: Conjunctivae normal       Pupils: Pupils are equal, round, and reactive to light  Neck:      Musculoskeletal: Normal range of motion and neck supple  Thyroid: No thyromegaly  Vascular: No JVD  Cardiovascular:      Rate and Rhythm: Normal rate and regular rhythm  Pulses: Normal pulses  Heart sounds: Normal heart sounds  No murmur  No friction rub  No gallop  Pulmonary:      Effort: Pulmonary effort is normal  No respiratory distress  Breath sounds: Normal breath sounds  No wheezing or rales  Chest:      Chest wall: No tenderness  Abdominal:      General: Abdomen is flat  Bowel sounds are normal  There is no distension  Palpations: Abdomen is soft  Tenderness: There is no abdominal tenderness  Musculoskeletal: Normal range of motion  General: No tenderness or deformity  Lymphadenopathy:      Cervical: No cervical adenopathy  Skin:     General: Skin is warm and dry  Capillary Refill: Capillary refill takes less than 2 seconds  Coloration: Skin is not pale  Findings: No erythema or rash  Neurological:      General: No focal deficit present  Mental Status: She is alert and oriented to person, place, and time  Cranial Nerves: No cranial nerve deficit  Coordination: Coordination normal    Psychiatric:         Mood and Affect: Mood normal          Behavior: Behavior normal          Thought Content: Thought content normal          Judgment: Judgment normal              Future Appointments   Date Time Provider Fernando Latosha   3/16/2021  3:40 PM [facility] [address]   3/26/2021 10:20 AM Virgilio Joe MD [facility]   9/7/2021  5:15 PM Sydni Nunes MD [facility] [address]    Patient Care Team:  Sydni Nunes MD as PCP - General (Internal Medicine)  Sydni Nunes MD as PCP - [facility]    This note was completed in part utilizing M-Modal Fluency Direct Software  Grammatical errors, random word insertions, spelling mistakes, and incomplete sentences can be an occasional consequence of this system secondary to software limitations, ambient noise, and hardware issues    If you have any questions or concerns about the content, text, or information contained within the body of this dictation, please contact the provider for clarification

## 2021-03-02 NOTE — ASSESSMENT & PLAN NOTE
- Patient is being followed by pain medicine and receiving corticosteroid injections  - Patient advised to take gabapentin 2 capsules 3 times a day but is complaining of some drowsiness

## 2021-03-03 RX ORDER — CYCLOBENZAPRINE HCL 5 MG
5 TABLET ORAL 3 TIMES DAILY PRN
Qty: 90 TABLET | Refills: 1 | Status: SHIPPED | OUTPATIENT
Start: 2021-03-03 | End: 2021-06-30

## 2021-03-03 NOTE — TELEPHONE ENCOUNTER
S/w the patient and reviewed she stated she did increase it too 2 pills TID and found her self feeling really tired  So, yesterday she took 1 tablet in the am, 2 at lunch and 2 at bedtime and thought she felt better last night but then the pain came back  Encouraged her to take it consistently  She was wondering if she could take flexeril for breakthrough pain and she does realize that it could cause her to become more tired  If she can, may she have a script for that because she stated she ran out before she started the gabapentin   Thanks

## 2021-03-12 NOTE — TELEPHONE ENCOUNTER
Pt called back in she said that she would like to cancel her procedure  It's scheduled for 3/16/21, the pain is starting to go away   Thank you

## 2021-03-12 NOTE — TELEPHONE ENCOUNTER
Left detailed MOM letting pt know she can cx procedure if Gabapentin is working, then we can reschedule PRN if needed, asked pt to cb, c/b# given

## 2021-03-12 NOTE — TELEPHONE ENCOUNTER
Patient called in stating that shes not sure if she should come injections since the increased gabapentin seems to be working for pain   Thank you      [phone]

## 2021-03-15 ENCOUNTER — TELEPHONE (OUTPATIENT)
Dept: PAIN MEDICINE | Facility: CLINIC | Age: 46
End: 2021-03-15

## 2021-03-15 DIAGNOSIS — M54.16 LUMBAR RADICULOPATHY: ICD-10-CM

## 2021-03-15 NOTE — TELEPHONE ENCOUNTER
Patient   [phone]  Dr Trammell    Pt is requesting a higher dosage of the gabapentin (NEURONTIN

## 2021-03-16 RX ORDER — GABAPENTIN 300 MG/1
600 CAPSULE ORAL 3 TIMES DAILY
Qty: 180 CAPSULE | Refills: 1 | Status: SHIPPED | OUTPATIENT
Start: 2021-03-16 | End: 2021-05-25 | Stop reason: SDUPTHER

## 2021-03-16 NOTE — TELEPHONE ENCOUNTER
Patient called returning the nurses call  Please be advise thank you      Please call patient back @ [phone] and leave a message

## 2021-03-16 NOTE — TELEPHONE ENCOUNTER
S/W pt  Pt requesting refill of Gabapentin 300 mg, takes 2 capsules 3x/day  She stated the increase is helping and denies side effects  Pt stated no call back needed when the script is sent  Please advise

## 2021-03-16 NOTE — TELEPHONE ENCOUNTER
Left a detailed message as per previous task, advising pt the same  C/B # provided for any questions

## 2021-03-18 NOTE — TELEPHONE ENCOUNTER
RN s/w pt  Pt states " I was dong so well since my Gabapentin was increased so I cancelled the TFESI for 3/16  On Tuesday I tweaked my back at work and yesterday it was bad  I was having pain down my left leg which has resolved with the Gabapentin increase  I now have pain in my tailbone and both butt cheeks " Per pt she is now taking Gabapentin 600 mg tid, took Flexeril last night and Ibuprofen which helped her  Pt states she is feeling better today but did not go to work due to the pain she was having yesterday  Pt has not tried ice or heat  Pt advised to continue with Gabapentin and Flexeril as prescribed, take Ibuprofen per instructions on bottlle and try ice/heat 20 mins on 20 mins off which she has not tried yet  Please advise- Any additional recs? OV to eval new pain?

## 2021-03-18 NOTE — TELEPHONE ENCOUNTER
Agree with recommendations and no further recommendations at this time  Please schedule office visit for re-evaluation    Thank you

## 2021-03-26 ENCOUNTER — OFFICE VISIT (OUTPATIENT)
Dept: OBGYN CLINIC | Facility: MEDICAL CENTER | Age: 46
End: 2021-03-26
Payer: COMMERCIAL

## 2021-03-26 VITALS
BODY MASS INDEX: 34.52 KG/M2 | SYSTOLIC BLOOD PRESSURE: 130 MMHG | DIASTOLIC BLOOD PRESSURE: 80 MMHG | WEIGHT: 207.2 LBS | HEIGHT: 65 IN

## 2021-03-26 DIAGNOSIS — N93.9 ABNORMAL UTERINE BLEEDING (AUB): ICD-10-CM

## 2021-03-26 DIAGNOSIS — Z97.5 IUD (INTRAUTERINE DEVICE) IN PLACE: Primary | ICD-10-CM

## 2021-03-26 PROCEDURE — 99213 OFFICE O/P EST LOW 20 MIN: CPT | Performed by: STUDENT IN AN ORGANIZED HEALTH CARE EDUCATION/TRAINING PROGRAM

## 2021-03-26 PROCEDURE — 3008F BODY MASS INDEX DOCD: CPT | Performed by: INTERNAL MEDICINE

## 2021-03-26 NOTE — PROGRESS NOTES
Assessment/Plan:    Abnormal uterine bleeding (AUB)  Has daily brown spotting  Reports every 2-3 wks a 2-3 day cycle  Cramping pre cycle for a couple of days  We reviewed the options for change in regimen or alternative therapy including discontinuation of COCP or IUD, switch to depo, surgical management with ablation, repeat D&C or hysterectomy  Patient is ready for definitive management after 1 year of medical management with IUD and pills without ever achieving optimal control  Sampling last 1 year ago benign, has had IUD in place since that time so repeat not pursued  Pap up to date and negative  US ordered to assess for any significant interval change however expect stable  After reviewing options for expectant management, medical management and surgical management the patient elected definitive surgical procedure  We discussed previously the alternatives as well as the benefits of each treatment option  We reviewed the plan for robotic assisted laparoscopic hysterectomy with bilateral salpingectomy  We discussed the risks of this surgery include bleeding, infection and injury  The patient agrees if life threatening blood loss were to occur she would accept a blood transfusion  The risk for infection in this surgery is such that she will require pre operative antibiotics for prophylaxis  For this procedure the risks of injury include bowel, bladder, nerves, vessels, ureter, occult malignancy, need for additional procedures  Consent signed  Diagnoses and all orders for this visit:    IUD (intrauterine device) in place    Abnormal uterine bleeding (AUB)  -     US pelvis complete w transvaginal; Future          Subjective:      Patient ID: Jacinta Mendoza is a 39 y o  female  40 yo here for follow up of AUB  In short she presented a little over a year ago with same  She underwent EMB (benign)  Subsequently hysteroscopy, D&C and IUD placement   Her AUB persisted and both prog only and COCP layered to help control without success  She has daily brown discharge  She also has a 2-3 day cycle every 3-5 wks that is proceeded by a couple days of cramping  She is tired of the persistent bleeding and would like to pursue something definitive to help it stop  The following portions of the patient's history were reviewed and updated as appropriate: allergies, current medications, past family history, past medical history, past social history, past surgical history and problem list     Review of Systems   Constitutional: Negative for chills and fever  HENT: Negative for ear pain and sore throat  Eyes: Negative for pain and visual disturbance  Respiratory: Negative for cough and shortness of breath  Cardiovascular: Negative for chest pain and palpitations  Gastrointestinal: Negative for abdominal pain, constipation, diarrhea, nausea and vomiting  Genitourinary: Positive for vaginal bleeding and vaginal discharge  Negative for dyspareunia, dysuria, frequency, hematuria, pelvic pain, urgency and vaginal pain  Musculoskeletal: Negative for arthralgias and back pain  Skin: Negative for color change and rash  Neurological: Negative for seizures and syncope  All other systems reviewed and are negative  Objective:      /80 (BP Location: Right arm, Patient Position: Sitting, Cuff Size: Standard)   Ht 5' 5" (1 651 m)   Wt 94 kg (207 lb 3 2 oz)   BMI 34 48 kg/m²          Physical Exam  Vitals signs reviewed  Constitutional:       General: She is not in acute distress  Appearance: She is well-developed  She is not diaphoretic  HENT:      Head: Normocephalic and atraumatic  Neck:      Musculoskeletal: Normal range of motion  Pulmonary:      Effort: Pulmonary effort is normal  No respiratory distress  Genitourinary:     Labia:         Right: No rash, tenderness, lesion or injury  Left: No rash, tenderness, lesion or injury  Vagina: Bleeding present  No vaginal discharge, erythema or tenderness  Cervix: Discharge and cervical bleeding present  No friability, lesion or erythema  Uterus: Normal  Not enlarged and not tender  Adnexa: Right adnexa normal and left adnexa normal         Right: No mass, tenderness or fullness  Left: No mass, tenderness or fullness  Neurological:      Mental Status: She is alert and oriented to person, place, and time  Psychiatric:         Behavior: Behavior normal          Thought Content:  Thought content normal          Judgment: Judgment normal

## 2021-03-26 NOTE — PROGRESS NOTES
Pt presents for PV for bleeding   Brown discharge daily, every 3-5 weeks bleeding 2-3 days, getting heavier      pt is having "period cramping"     Pt is on mirena and ortho

## 2021-03-26 NOTE — ASSESSMENT & PLAN NOTE
Has daily brown spotting  Reports every 2-3 wks a 2-3 day cycle  Cramping pre cycle for a couple of days  We reviewed the options for change in regimen or alternative therapy including discontinuation of COCP or IUD, switch to depo, surgical management with ablation, repeat D&C or hysterectomy  Patient is ready for definitive management after 1 year of medical management with IUD and pills without ever achieving optimal control  Sampling last 1 year ago benign, has had IUD in place since that time so repeat not pursued  Pap up to date and negative  US ordered to assess for any significant interval change however expect stable  After reviewing options for expectant management, medical management and surgical management the patient elected definitive surgical procedure  We discussed previously the alternatives as well as the benefits of each treatment option  We reviewed the plan for robotic assisted laparoscopic hysterectomy with bilateral salpingectomy  We discussed the risks of this surgery include bleeding, infection and injury  The patient agrees if life threatening blood loss were to occur she would accept a blood transfusion  The risk for infection in this surgery is such that she will require pre operative antibiotics for prophylaxis  For this procedure the risks of injury include bowel, bladder, nerves, vessels, ureter, occult malignancy, need for additional procedures  Consent signed

## 2021-03-26 NOTE — H&P
Assessment/Plan:    Abnormal uterine bleeding (AUB)  Has daily brown spotting  Reports every 2-3 wks a 2-3 day cycle  Cramping pre cycle for a couple of days  We reviewed the options for change in regimen or alternative therapy including discontinuation of COCP or IUD, switch to depo, surgical management with ablation, repeat D&C or hysterectomy  Patient is ready for definitive management after 1 year of medical management with IUD and pills without ever achieving optimal control  Sampling last 1 year ago benign, has had IUD in place since that time so repeat not pursued  Pap up to date and negative  US ordered to assess for any significant interval change however expect stable  After reviewing options for expectant management, medical management and surgical management the patient elected definitive surgical procedure  We discussed previously the alternatives as well as the benefits of each treatment option  We reviewed the plan for robotic assisted laparoscopic hysterectomy with bilateral salpingectomy  We discussed the risks of this surgery include bleeding, infection and injury  The patient agrees if life threatening blood loss were to occur she would accept a blood transfusion  The risk for infection in this surgery is such that she will require pre operative antibiotics for prophylaxis  For this procedure the risks of injury include bowel, bladder, nerves, vessels, ureter, occult malignancy, need for additional procedures  Consent signed  Diagnoses and all orders for this visit:    IUD (intrauterine device) in place    Abnormal uterine bleeding (AUB)  -     US pelvis complete w transvaginal; Future          Subjective:      Patient ID: Garrett Virgen is a 39 y o  female  40 yo here for follow up of AUB  In short she presented a little over a year ago with same  She underwent EMB (benign)  Subsequently hysteroscopy, D&C and IUD placement   Her AUB persisted and both prog only and COCP layered to help control without success  She has daily brown discharge  She also has a 2-3 day cycle every 3-5 wks that is proceeded by a couple days of cramping  She is tired of the persistent bleeding and would like to pursue something definitive to help it stop  The following portions of the patient's history were reviewed and updated as appropriate: allergies, current medications, past family history, past medical history, past social history, past surgical history and problem list     Review of Systems   Constitutional: Negative for chills and fever  HENT: Negative for ear pain and sore throat  Eyes: Negative for pain and visual disturbance  Respiratory: Negative for cough and shortness of breath  Cardiovascular: Negative for chest pain and palpitations  Gastrointestinal: Negative for abdominal pain, constipation, diarrhea, nausea and vomiting  Genitourinary: Positive for vaginal bleeding and vaginal discharge  Negative for dyspareunia, dysuria, frequency, hematuria, pelvic pain, urgency and vaginal pain  Musculoskeletal: Negative for arthralgias and back pain  Skin: Negative for color change and rash  Neurological: Negative for seizures and syncope  All other systems reviewed and are negative  Objective:      /80 (BP Location: Right arm, Patient Position: Sitting, Cuff Size: Standard)   Ht 5' 5" (1 651 m)   Wt 94 kg (207 lb 3 2 oz)   BMI 34 48 kg/m²          Physical Exam  Vitals signs reviewed  Constitutional:       General: She is not in acute distress  Appearance: She is well-developed  She is not diaphoretic  HENT:      Head: Normocephalic and atraumatic  Neck:      Musculoskeletal: Normal range of motion  Pulmonary:      Effort: Pulmonary effort is normal  No respiratory distress  Genitourinary:     Labia:         Right: No rash, tenderness, lesion or injury  Left: No rash, tenderness, lesion or injury  Vagina: Bleeding present  No vaginal discharge, erythema or tenderness  Cervix: Discharge and cervical bleeding present  No friability, lesion or erythema  Uterus: Normal  Not enlarged and not tender  Adnexa: Right adnexa normal and left adnexa normal         Right: No mass, tenderness or fullness  Left: No mass, tenderness or fullness  Neurological:      Mental Status: She is alert and oriented to person, place, and time  Psychiatric:         Behavior: Behavior normal          Thought Content:  Thought content normal          Judgment: Judgment normal

## 2021-04-02 ENCOUNTER — OFFICE VISIT (OUTPATIENT)
Dept: PAIN MEDICINE | Facility: CLINIC | Age: 46
End: 2021-04-02
Payer: COMMERCIAL

## 2021-04-02 VITALS
DIASTOLIC BLOOD PRESSURE: 82 MMHG | BODY MASS INDEX: 34.49 KG/M2 | HEART RATE: 91 BPM | TEMPERATURE: 98.9 F | HEIGHT: 65 IN | WEIGHT: 207 LBS | SYSTOLIC BLOOD PRESSURE: 118 MMHG

## 2021-04-02 DIAGNOSIS — M47.816 LUMBAR SPONDYLOSIS: ICD-10-CM

## 2021-04-02 DIAGNOSIS — M48.061 SPINAL STENOSIS OF LUMBAR REGION, UNSPECIFIED WHETHER NEUROGENIC CLAUDICATION PRESENT: ICD-10-CM

## 2021-04-02 DIAGNOSIS — M54.16 LUMBAR RADICULOPATHY: Primary | ICD-10-CM

## 2021-04-02 PROCEDURE — 99214 OFFICE O/P EST MOD 30 MIN: CPT | Performed by: ANESTHESIOLOGY

## 2021-04-02 PROCEDURE — 3008F BODY MASS INDEX DOCD: CPT | Performed by: ANESTHESIOLOGY

## 2021-04-02 PROCEDURE — 1036F TOBACCO NON-USER: CPT | Performed by: ANESTHESIOLOGY

## 2021-04-02 NOTE — PROGRESS NOTES
Assessment  1  Lumbar radiculopathy    2  Lumbar spondylosis    3  Spinal stenosis of lumbar region, unspecified whether neurogenic claudication present        Plan  49-year-old female with a history of previous lumbar surgeries, lumbar degenerative disc disease, spondylosis, and lumbar radiculopathy returning for follow-up  The patient's left lower extremity radicular symptoms have been resolved since left L3 and L4 TFESI  She is now complaining of low back pain radiating into the buttocks and into the posterolateral aspect of the legs to the knees  She is currently taking gabapentin 600 mg t i d  with about 80% relief  She denies any side effects of the medication  She does take Flexeril 5 mg p r n  on a sparing basis  This does provide mild relief, but also causes sedation  1  I did offer the patient bilateral L5 TFESI, however the patient does not feel that her pain is bad enough to warrant interventional therapy at this time  2  The patient will continue with gabapentin 600 mg t i d   3  Patient may continue with cyclobenzaprine as prescribed  4  Patient will continue with her home exercise program  5  I will follow up the patient in 6-8 weeks or sooner if needed  6  Will repeat left L3 and L4 TFESI p r n  My impressions and treatment recommendations were discussed in detail with the patient who verbalized understanding and had no further questions  Discharge instructions were provided  I personally saw and examined the patient and I agree with the above discussed plan of care  No orders of the defined types were placed in this encounter  No orders of the defined types were placed in this encounter  History of Present Illness    Flaco Maciel is a 39 y o  female with a history of previous lumbar surgeries, lumbar degenerative disc disease, spondylosis, and lumbar radiculopathy returning for follow-up    The patient's left lower extremity radicular symptoms have been resolved since left L3 and L4 TFESI  She is now complaining of low back pain radiating into the buttocks and into the posterolateral aspect of the legs to the knees  She denies any numbness, paresthesias, or lower extremity weakness  She denies any bladder or bowel incontinence or saddle anesthesia  She is currently taking gabapentin 600 mg t i d  with about 80% relief  She denies any side effects of the medication  She does take Flexeril 5 mg p r n  on a sparing basis  This does provide mild relief, but also causes sedation  The patient rates her pain a 1 to 2/10 depending upon her activity  The pain is worse in the evening at night  The pain is intermittent and described as sharp and shooting  The pain is worse with standing, walking, bending, and exercise  The pain is alleviated with sitting, relaxation, and lying down  Other than as stated above, the patient denies any interval changes in medications, medical condition, mental condition, symptoms, or allergies since the last office visit  I have personally reviewed and/or updated the patient's past medical history, past surgical history, family history, social history, current medications, allergies, and vital signs today  Review of Systems   Constitutional: Negative for fever and unexpected weight change  HENT: Negative for trouble swallowing  Eyes: Negative for visual disturbance  Respiratory: Negative for shortness of breath and wheezing  Cardiovascular: Negative for chest pain and palpitations  Gastrointestinal: Negative for constipation, diarrhea, nausea and vomiting  Endocrine: Negative for cold intolerance, heat intolerance and polydipsia  Genitourinary: Negative for difficulty urinating and frequency  Musculoskeletal: Negative for arthralgias, gait problem, joint swelling and myalgias  Pain in extremity   Skin: Negative for rash  Neurological: Negative for dizziness, seizures, syncope, weakness and headaches  Hematological: Does not bruise/bleed easily  Psychiatric/Behavioral: Negative for dysphoric mood  All other systems reviewed and are negative  Patient Active Problem List   Diagnosis    Hair loss    Anxiety and depression    BMI 33 0-33 9,adult    Vitamin D deficiency    Seasonal allergies    Abnormal uterine bleeding    Hypercholesterolemia    Abnormal uterine bleeding (AUB)    IUD (intrauterine device) in place    Acute left-sided low back pain with left-sided sciatica    Lumbar radiculopathy    Lumbar disc herniation    Lumbar spondylosis       Past Medical History:   Diagnosis Date    Back disorder     Depression     PONV (postoperative nausea and vomiting)        Past Surgical History:   Procedure Laterality Date    BACK SURGERY      Lumbar( L3)     SECTION  1438,5473    X2    CHOLECYSTECTOMY      ENDOMETRIAL BIOPSY      EXAMINATION UNDER ANESTHESIA N/A 5/15/2020    Procedure: EXAM UNDER ANESTHESIA (EUA);   Surgeon: Zuhair Flores MD;  Location: [facility];  Service: Gynecology    INDUCED       times 2    OH HYSTEROSCOPY,W/ENDO BX N/A 5/15/2020    Procedure: DILATATION AND CURETTAGE (D&C) WITH HYSTEROSCOPY;  Surgeon: Zuhair Flores MD;  Location: [facility];  Service: Gynecology    OH INSERT INTRAUTERINE DEVICE N/A 5/15/2020    Procedure: Mirena insertion;  Surgeon: Zuhair Flores MD;  Location: [facility];  Service: Gynecology    TUBAL LIGATION      WISDOM TOOTH EXTRACTION  10/05/2020       Family History   Problem Relation Age of Onset    Diabetes Mother     Hypertension Mother     Anxiety disorder Family         symptom    Other Family         breast disorders    Heart disease Other         cardiac disorder    Heart disease Other         cardiac disorder    Depression Father     Depression Sister        Social History     Occupational History    Occupation: currently works full-time   Tobacco Use    Smoking status: Never Smoker    Smokeless tobacco: Never Used   Substance and Sexual Activity    Alcohol use: Not Currently    Drug use: No    Sexual activity: Yes     Birth control/protection: Surgical, Female Sterilization     Comment: Tubal ligations       Current Outpatient Medications on File Prior to Visit   Medication Sig    cyclobenzaprine (FLEXERIL) 5 mg tablet Take 1 tablet (5 mg total) by mouth 3 (three) times a day as needed for muscle spasms    escitalopram (LEXAPRO) 10 mg tablet Take 1 tablet (10 mg total) by mouth daily at bedtime    gabapentin (NEURONTIN) 300 mg capsule Take 2 capsules (600 mg total) by mouth 3 (three) times a day    levonorgestrel (MIRENA) 20 MCG/24HR IUD 1 each by Intrauterine route once    multivitamin-minerals (CENTRUM ADULTS) tablet Take 1 tablet by mouth daily    norgestimate-ethinyl estradiol (ORTHO-CYCLEN) 0 25-35 MG-MCG per tablet Take 1 tablet by mouth daily Skip placebo pills, 2 packs will last 6 wks    rosuvastatin (CRESTOR) 10 MG tablet Take 1 tablet (10 mg total) by mouth daily     No current facility-administered medications on file prior to visit  Allergies   Allergen Reactions    Amoxicillin Rash       Physical Exam    /82   Pulse 91   Temp 98 9 °F (37 2 °C)   Ht 5' 5" (1 651 m)   Wt 93 9 kg (207 lb)   BMI 34 45 kg/m²     Constitutional: normal, well developed, well nourished, alert, in no distress and non-toxic and no overt pain behavior  Eyes: anicteric  HEENT: grossly intact  Neck: supple, symmetric, trachea midline and no masses   Pulmonary:even and unlabored  Cardiovascular:No edema or pitting edema present  Skin:Normal without rashes or lesions and well hydrated  Psychiatric:Mood and affect appropriate  Neurologic:Cranial Nerves II-XII grossly intact  Musculoskeletal:normal  Gait  Bilateral lumbar paraspinals mildly tender to palpation from L4-L5  Bilateral lower extremity strength 5/5 in all muscle groups    Sensation intact to light touch in L3 through S1 dermatomes bilaterally  Negative seated straight leg raise bilaterally      Imaging  Imaging reviewed

## 2021-04-16 ENCOUNTER — HOSPITAL ENCOUNTER (OUTPATIENT)
Dept: RADIOLOGY | Facility: MEDICAL CENTER | Age: 46
Discharge: HOME | End: 2021-04-16
Payer: COMMERCIAL

## 2021-04-16 DIAGNOSIS — N93.9 ABNORMAL UTERINE BLEEDING (AUB): ICD-10-CM

## 2021-04-16 PROCEDURE — 76830 TRANSVAGINAL US NON-OB: CPT

## 2021-04-16 PROCEDURE — 76856 US EXAM PELVIC COMPLETE: CPT

## 2021-04-23 ENCOUNTER — TELEPHONE (OUTPATIENT)
Dept: PAIN MEDICINE | Facility: CLINIC | Age: 46
End: 2021-04-23

## 2021-04-23 NOTE — TELEPHONE ENCOUNTER
S/w pt, she would like to schedule the bilateral L5 TFESI as discussed at her last OV, ok to schedule? Symptoms unchanged

## 2021-04-23 NOTE — TELEPHONE ENCOUNTER
Okay to schedule from my standpoint, however the patient does have a robotic hysterectomy scheduled with her gyn    Please reach out to gyn to find out if they have any objections to her receiving an epidural steroid injection prior to surgery

## 2021-04-23 NOTE — TELEPHONE ENCOUNTER
Hi Dr Wilber Rod,  This mutual patient is scheduled with you on 5/19 for a robotic hysterectomy and she is calling our office to have a steroid injection for her lower back pain  Do you have any objections to her getting the steroid injection prior to her hysterectomy?   Thanks,  Darryl Santizo RN  [facility]

## 2021-04-28 NOTE — TELEPHONE ENCOUNTER
Regis Kruger,    As of now our pt is scheduled for her procedure on 5/10  Pt is scheduled for your procedure on 5/19  Is this an appropriate amount of time between procedures or would you like them further apart?  Please advise    Thank you

## 2021-04-28 NOTE — TELEPHONE ENCOUNTER
Patient returning your call     [phone] ext [phone] ask for Chano Mccoy     Will be at work until 4:30     Thank you

## 2021-05-10 ENCOUNTER — APPOINTMENT (OUTPATIENT)
Dept: LAB | Facility: IMAGING CENTER | Age: 46
End: 2021-05-10
Payer: COMMERCIAL

## 2021-05-10 ENCOUNTER — HOSPITAL ENCOUNTER (OUTPATIENT)
Dept: RADIOLOGY | Facility: CLINIC | Age: 46
Discharge: HOME | End: 2021-05-10
Attending: ANESTHESIOLOGY | Admitting: ANESTHESIOLOGY
Payer: COMMERCIAL

## 2021-05-10 VITALS
TEMPERATURE: 97.4 F | RESPIRATION RATE: 18 BRPM | HEART RATE: 100 BPM | SYSTOLIC BLOOD PRESSURE: 130 MMHG | OXYGEN SATURATION: 97 % | DIASTOLIC BLOOD PRESSURE: 89 MMHG

## 2021-05-10 DIAGNOSIS — Z01.818 PRE-OP TESTING: ICD-10-CM

## 2021-05-10 DIAGNOSIS — M54.16 LUMBAR RADICULOPATHY: ICD-10-CM

## 2021-05-10 LAB
ABO GROUP BLD: NORMAL
ANION GAP SERPL CALCULATED.3IONS-SCNC: 8 MMOL/L (ref 4–13)
BASOPHILS # BLD AUTO: 0.06 THOUSANDS/ΜL (ref 0–0.1)
BASOPHILS NFR BLD AUTO: 1 % (ref 0–1)
BLD GP AB SCN SERPL QL: NEGATIVE
BUN SERPL-MCNC: 15 MG/DL (ref 5–25)
CALCIUM SERPL-MCNC: 9.3 MG/DL (ref 8.3–10.1)
CHLORIDE SERPL-SCNC: 104 MMOL/L (ref 100–108)
CO2 SERPL-SCNC: 24 MMOL/L (ref 21–32)
CREAT SERPL-MCNC: 0.7 MG/DL (ref 0.6–1.3)
EOSINOPHIL # BLD AUTO: 0.14 THOUSAND/ΜL (ref 0–0.61)
EOSINOPHIL NFR BLD AUTO: 2 % (ref 0–6)
ERYTHROCYTE [DISTWIDTH] IN BLOOD BY AUTOMATED COUNT: 12.9 % (ref 11.6–15.1)
EST. AVERAGE GLUCOSE BLD GHB EST-MCNC: 111 MG/DL
GFR SERPL CREATININE-BSD FRML MDRD: 105 ML/MIN/1.73SQ M
GLUCOSE P FAST SERPL-MCNC: 91 MG/DL (ref 65–99)
HBA1C MFR BLD: 5.5 %
HCT VFR BLD AUTO: 45.1 % (ref 34.8–46.1)
HGB BLD-MCNC: 14.3 G/DL (ref 11.5–15.4)
IMM GRANULOCYTES # BLD AUTO: 0.03 THOUSAND/UL (ref 0–0.2)
IMM GRANULOCYTES NFR BLD AUTO: 0 % (ref 0–2)
LYMPHOCYTES # BLD AUTO: 2.75 THOUSANDS/ΜL (ref 0.6–4.47)
LYMPHOCYTES NFR BLD AUTO: 40 % (ref 14–44)
MCH RBC QN AUTO: 29.5 PG (ref 26.8–34.3)
MCHC RBC AUTO-ENTMCNC: 31.7 G/DL (ref 31.4–37.4)
MCV RBC AUTO: 93 FL (ref 82–98)
MONOCYTES # BLD AUTO: 0.38 THOUSAND/ΜL (ref 0.17–1.22)
MONOCYTES NFR BLD AUTO: 6 % (ref 4–12)
NEUTROPHILS # BLD AUTO: 3.52 THOUSANDS/ΜL (ref 1.85–7.62)
NEUTS SEG NFR BLD AUTO: 51 % (ref 43–75)
NRBC BLD AUTO-RTO: 0 /100 WBCS
PLATELET # BLD AUTO: 211 THOUSANDS/UL (ref 149–390)
PMV BLD AUTO: 10.2 FL (ref 8.9–12.7)
POTASSIUM SERPL-SCNC: 4.8 MMOL/L (ref 3.5–5.3)
RBC # BLD AUTO: 4.85 MILLION/UL (ref 3.81–5.12)
RH BLD: POSITIVE
SODIUM SERPL-SCNC: 136 MMOL/L (ref 136–145)
SPECIMEN EXPIRATION DATE: NORMAL
WBC # BLD AUTO: 6.88 THOUSAND/UL (ref 4.31–10.16)

## 2021-05-10 PROCEDURE — 64483 NJX AA&/STRD TFRM EPI L/S 1: CPT | Performed by: ANESTHESIOLOGY

## 2021-05-10 PROCEDURE — 36415 COLL VENOUS BLD VENIPUNCTURE: CPT

## 2021-05-10 PROCEDURE — 86901 BLOOD TYPING SEROLOGIC RH(D): CPT

## 2021-05-10 PROCEDURE — 80048 BASIC METABOLIC PNL TOTAL CA: CPT

## 2021-05-10 PROCEDURE — 83036 HEMOGLOBIN GLYCOSYLATED A1C: CPT

## 2021-05-10 PROCEDURE — 86900 BLOOD TYPING SEROLOGIC ABO: CPT

## 2021-05-10 PROCEDURE — 86850 RBC ANTIBODY SCREEN: CPT

## 2021-05-10 PROCEDURE — 85025 COMPLETE CBC W/AUTO DIFF WBC: CPT

## 2021-05-10 RX ORDER — PAPAVERINE HCL 150 MG
20 CAPSULE, EXTENDED RELEASE ORAL ONCE
Status: COMPLETED | OUTPATIENT
Start: 2021-05-10 | End: 2021-05-10

## 2021-05-10 RX ADMIN — LIDOCAINE HYDROCHLORIDE 2 ML: 20 INJECTION, SOLUTION EPIDURAL; INFILTRATION; INTRACAUDAL; PERINEURAL at 15:23

## 2021-05-10 RX ADMIN — IOHEXOL 2 ML: 300 INJECTION, SOLUTION INTRAVENOUS at 15:22

## 2021-05-10 RX ADMIN — DEXAMETHASONE SODIUM PHOSPHATE 15 MG: 10 INJECTION, SOLUTION INTRAMUSCULAR; INTRAVENOUS at 15:23

## 2021-05-10 NOTE — DISCHARGE INSTR - LAB
Epidural Steroid Injection   WHAT YOU NEED TO KNOW:   An epidural steroid injection (JEFFREY) is a procedure to inject steroid medicine into the epidural space  The epidural space is between your spinal cord and vertebrae  Steroids reduce inflammation and fluid buildup in your spine that may be causing pain  You may be given pain medicine along with the steroids  ACTIVITY  · Do not drive or operate machinery today  · No strenuous activity today - bending, lifting, etc   · You may resume normal activites starting tomorrow - start slowly and as tolerated  · You may shower today, but no tub baths or hot tubs  · You may have numbness for several hours from the local anesthetic  Please use caution and common sense, especially with weight-bearing activities  CARE OF THE INJECTION SITE  · If you have soreness or pain, apply ice to the area today (20 minutes on/20 minutes off)  · Starting tomorrow, you may use warm, moist heat or ice if needed  · You may have an increase or change in your discomfort for 36-48 hours after your treatment  · Apply ice and continue with any pain medication you have been prescribed  · Notify the [facility] if you have any of the following: redness, drainage, swelling, headache, stiff neck or fever above 100°F     SPECIAL INSTRUCTIONS  · Our office will contact you in approximately 7 days for a progress report  MEDICATIONS  · Continue to take all routine medications  · Our office may have instructed you to hold some medications  As no general anesthesia was used in today's procedure, you should not experience any side effects related to anesthesia  If you have a problem specifically related to your procedure, please call our office at [phone]  Problems not related to your procedure should be directed to your primary care physician

## 2021-05-17 ENCOUNTER — TELEPHONE (OUTPATIENT)
Dept: PAIN MEDICINE | Facility: CLINIC | Age: 46
End: 2021-05-17

## 2021-05-17 PROCEDURE — NC001 PR NO CHARGE: Performed by: STUDENT IN AN ORGANIZED HEALTH CARE EDUCATION/TRAINING PROGRAM

## 2021-05-17 NOTE — H&P
H&P collected at office visit 3/26/21  Will reconfirm all information and complete remainder of pre op exam day of surgery  __________________________________  Assessment/Plan:    Abnormal uterine bleeding (AUB)  Has daily brown spotting  Reports every 2-3 wks a 2-3 day cycle  Cramping pre cycle for a couple of days  We reviewed the options for change in regimen or alternative therapy including discontinuation of COCP or IUD, switch to depo, surgical management with ablation, repeat D&C or hysterectomy  Patient is ready for definitive management after 1 year of medical management with IUD and pills without ever achieving optimal control       Sampling last 1 year ago benign, has had IUD in place since that time so repeat not pursued  Pap up to date and negative  US ordered to assess for any significant interval change however expect stable      After reviewing options for expectant management, medical management and surgical management the patient elected definitive surgical procedure  We discussed previously the alternatives as well as the benefits of each treatment option  We reviewed the plan for robotic assisted laparoscopic hysterectomy with bilateral salpingectomy  We discussed the risks of this surgery include bleeding, infection and injury  The patient agrees if life threatening blood loss were to occur she would accept a blood transfusion  The risk for infection in this surgery is such that she will require pre operative antibiotics for prophylaxis  For this procedure the risks of injury include bowel, bladder, nerves, vessels, ureter, occult malignancy, need for additional procedures  Consent signed  Diagnoses and all orders for this visit:    IUD (intrauterine device) in place    Abnormal uterine bleeding (AUB)  -     US pelvis complete w transvaginal; Future          Subjective:      Patient ID: Curry Valerio is a 39 y o  female  40 yo here for follow up of AUB   In short she presented a little over a year ago with same  She underwent EMB (benign)  Subsequently hysteroscopy, D&C and IUD placement  Her AUB persisted and both prog only and COCP layered to help control without success  She has daily brown discharge  She also has a 2-3 day cycle every 3-5 wks that is proceeded by a couple days of cramping  She is tired of the persistent bleeding and would like to pursue something definitive to help it stop  The following portions of the patient's history were reviewed and updated as appropriate: allergies, current medications, past family history, past medical history, past social history, past surgical history and problem list     Review of Systems   Constitutional: Negative for chills and fever  HENT: Negative for ear pain and sore throat  Eyes: Negative for pain and visual disturbance  Respiratory: Negative for cough and shortness of breath  Cardiovascular: Negative for chest pain and palpitations  Gastrointestinal: Negative for abdominal pain, constipation, diarrhea, nausea and vomiting  Genitourinary: Positive for vaginal bleeding and vaginal discharge  Negative for dyspareunia, dysuria, frequency, hematuria, pelvic pain, urgency and vaginal pain  Musculoskeletal: Negative for arthralgias and back pain  Skin: Negative for color change and rash  Neurological: Negative for seizures and syncope  All other systems reviewed and are negative  Objective: There were no vitals taken for this visit  Physical Exam  Vitals signs reviewed  Constitutional:       General: She is not in acute distress  Appearance: She is well-developed  She is not diaphoretic  HENT:      Head: Normocephalic and atraumatic  Neck:      Musculoskeletal: Normal range of motion  Pulmonary:      Effort: Pulmonary effort is normal  No respiratory distress  Genitourinary:     Labia:         Right: No rash, tenderness, lesion or injury           Left: No rash, tenderness, lesion or injury  Vagina: Bleeding present  No vaginal discharge, erythema or tenderness  Cervix: Discharge and cervical bleeding present  No friability, lesion or erythema  Uterus: Normal  Not enlarged and not tender  Adnexa: Right adnexa normal and left adnexa normal         Right: No mass, tenderness or fullness  Left: No mass, tenderness or fullness  Neurological:      Mental Status: She is alert and oriented to person, place, and time  Psychiatric:         Behavior: Behavior normal          Thought Content:  Thought content normal          Judgment: Judgment normal

## 2021-05-18 ENCOUNTER — ANESTHESIA EVENT (OUTPATIENT)
Dept: PERIOP | Facility: HOSPITAL | Age: 46
End: 2021-05-18
Payer: COMMERCIAL

## 2021-05-18 NOTE — PRE-PROCEDURE INSTRUCTIONS
Pre-Surgery Instructions:   Medication Instructions    cyclobenzaprine (FLEXERIL) 5 mg tablet Instructed to take as needed including DOS with sips water    escitalopram (LEXAPRO) 10 mg tablet Instructed to take per normal schedule @ Bedtime    gabapentin (NEURONTIN) 300 mg capsule Instructed to take per normal schedule including DOS with sips water    levonorgestrel (MIRENA) 20 MCG/24HR IUD Internal    multivitamin-minerals (CENTRUM ADULTS) tablet Instructed patient per Anesthesia Guidelines   rosuvastatin (CRESTOR) 10 MG tablet Instructed to take per normal schedule @ Bedtime    Pre op instructions per My Surgical Experience booklet,medications per anesthesia guidelines and showering instructions per [facility] protocol reviewed-Patient has CHG  Pt  Verbalized understanding of current visitor restrictions  Reviewed Carb Drink Instructions per Surgeon/Anes  Guidelines   Instructed to avoid all ASA/NSAIDs and OTC Vit/Supp from now until after surgery  Tylenol ok prn  Pt  Verbalized an understanding of all instructions reviewed and offers no concerns at this time

## 2021-05-19 ENCOUNTER — ANESTHESIA (OUTPATIENT)
Dept: PERIOP | Facility: HOSPITAL | Age: 46
End: 2021-05-19
Payer: COMMERCIAL

## 2021-05-19 ENCOUNTER — HOSPITAL ENCOUNTER (OUTPATIENT)
Facility: HOSPITAL | Age: 46
Setting detail: OUTPATIENT SURGERY
Discharge: HOME | End: 2021-05-19
Attending: STUDENT IN AN ORGANIZED HEALTH CARE EDUCATION/TRAINING PROGRAM | Admitting: STUDENT IN AN ORGANIZED HEALTH CARE EDUCATION/TRAINING PROGRAM
Payer: COMMERCIAL

## 2021-05-19 VITALS
SYSTOLIC BLOOD PRESSURE: 136 MMHG | TEMPERATURE: 97.5 F | OXYGEN SATURATION: 95 % | RESPIRATION RATE: 18 BRPM | DIASTOLIC BLOOD PRESSURE: 69 MMHG | HEIGHT: 65 IN | BODY MASS INDEX: 34.49 KG/M2 | WEIGHT: 207 LBS | HEART RATE: 88 BPM

## 2021-05-19 DIAGNOSIS — N93.9 ABNORMAL UTERINE BLEEDING (AUB): ICD-10-CM

## 2021-05-19 DIAGNOSIS — Z41.9 ELECTIVE SURGERY: Primary | ICD-10-CM

## 2021-05-19 LAB
EXT PREGNANCY TEST URINE: NEGATIVE
EXT. CONTROL: NORMAL
GLUCOSE SERPL-MCNC: 106 MG/DL (ref 65–140)

## 2021-05-19 PROCEDURE — 82948 REAGENT STRIP/BLOOD GLUCOSE: CPT

## 2021-05-19 PROCEDURE — 88307 TISSUE EXAM BY PATHOLOGIST: CPT | Performed by: PATHOLOGY

## 2021-05-19 PROCEDURE — 81025 URINE PREGNANCY TEST: CPT | Performed by: STUDENT IN AN ORGANIZED HEALTH CARE EDUCATION/TRAINING PROGRAM

## 2021-05-19 PROCEDURE — NC001 PR NO CHARGE: Performed by: STUDENT IN AN ORGANIZED HEALTH CARE EDUCATION/TRAINING PROGRAM

## 2021-05-19 PROCEDURE — 58571 TLH W/T/O 250 G OR LESS: CPT | Performed by: STUDENT IN AN ORGANIZED HEALTH CARE EDUCATION/TRAINING PROGRAM

## 2021-05-19 RX ORDER — CEFAZOLIN SODIUM 2 G/50ML
2000 SOLUTION INTRAVENOUS ONCE
Status: COMPLETED | OUTPATIENT
Start: 2021-05-19 | End: 2021-05-19

## 2021-05-19 RX ORDER — OXYCODONE HYDROCHLORIDE 5 MG/1
5 TABLET ORAL EVERY 6 HOURS PRN
Qty: 4 TABLET | Refills: 0 | Status: SHIPPED | OUTPATIENT
Start: 2021-05-19 | End: 2021-05-29

## 2021-05-19 RX ORDER — SODIUM CHLORIDE 9 MG/ML
INJECTION, SOLUTION INTRAVENOUS CONTINUOUS PRN
Status: DISCONTINUED | OUTPATIENT
Start: 2021-05-19 | End: 2021-05-19

## 2021-05-19 RX ORDER — OXYCODONE HYDROCHLORIDE 5 MG/1
5 TABLET ORAL EVERY 4 HOURS PRN
Status: DISCONTINUED | OUTPATIENT
Start: 2021-05-19 | End: 2021-05-19 | Stop reason: HOSPADM

## 2021-05-19 RX ORDER — PROMETHAZINE HYDROCHLORIDE 25 MG/ML
25 INJECTION, SOLUTION INTRAMUSCULAR; INTRAVENOUS ONCE AS NEEDED
Status: DISCONTINUED | OUTPATIENT
Start: 2021-05-19 | End: 2021-05-19 | Stop reason: HOSPADM

## 2021-05-19 RX ORDER — ROCURONIUM BROMIDE 10 MG/ML
INJECTION, SOLUTION INTRAVENOUS AS NEEDED
Status: DISCONTINUED | OUTPATIENT
Start: 2021-05-19 | End: 2021-05-19

## 2021-05-19 RX ORDER — HEPARIN SODIUM 5000 [USP'U]/ML
5000 INJECTION, SOLUTION INTRAVENOUS; SUBCUTANEOUS
Status: COMPLETED | OUTPATIENT
Start: 2021-05-19 | End: 2021-05-19

## 2021-05-19 RX ORDER — GLYCOPYRROLATE 0.2 MG/ML
INJECTION INTRAMUSCULAR; INTRAVENOUS AS NEEDED
Status: DISCONTINUED | OUTPATIENT
Start: 2021-05-19 | End: 2021-05-19

## 2021-05-19 RX ORDER — DEXAMETHASONE SODIUM PHOSPHATE 4 MG/ML
INJECTION, SOLUTION INTRA-ARTICULAR; INTRALESIONAL; INTRAMUSCULAR; INTRAVENOUS; SOFT TISSUE AS NEEDED
Status: DISCONTINUED | OUTPATIENT
Start: 2021-05-19 | End: 2021-05-19

## 2021-05-19 RX ORDER — FENTANYL CITRATE/PF 50 MCG/ML
50 SYRINGE (ML) INJECTION
Status: DISCONTINUED | OUTPATIENT
Start: 2021-05-19 | End: 2021-05-19 | Stop reason: HOSPADM

## 2021-05-19 RX ORDER — GABAPENTIN 100 MG/1
100 CAPSULE ORAL ONCE
Status: DISCONTINUED | OUTPATIENT
Start: 2021-05-19 | End: 2021-05-19 | Stop reason: HOSPADM

## 2021-05-19 RX ORDER — IBUPROFEN 800 MG/1
800 TABLET ORAL EVERY 8 HOURS SCHEDULED
Qty: 30 TABLET | Refills: 0 | Status: SHIPPED | OUTPATIENT
Start: 2021-05-19 | End: 2022-01-20

## 2021-05-19 RX ORDER — HYDROMORPHONE HCL 110MG/55ML
PATIENT CONTROLLED ANALGESIA SYRINGE INTRAVENOUS AS NEEDED
Status: DISCONTINUED | OUTPATIENT
Start: 2021-05-19 | End: 2021-05-19

## 2021-05-19 RX ORDER — LIDOCAINE HYDROCHLORIDE 10 MG/ML
INJECTION, SOLUTION EPIDURAL; INFILTRATION; INTRACAUDAL; PERINEURAL AS NEEDED
Status: DISCONTINUED | OUTPATIENT
Start: 2021-05-19 | End: 2021-05-19

## 2021-05-19 RX ORDER — MAGNESIUM HYDROXIDE 1200 MG/15ML
LIQUID ORAL AS NEEDED
Status: DISCONTINUED | OUTPATIENT
Start: 2021-05-19 | End: 2021-05-19 | Stop reason: HOSPADM

## 2021-05-19 RX ORDER — ACETAMINOPHEN 325 MG/1
975 TABLET ORAL ONCE
Status: COMPLETED | OUTPATIENT
Start: 2021-05-19 | End: 2021-05-19

## 2021-05-19 RX ORDER — OXYCODONE HYDROCHLORIDE 5 MG/1
10 TABLET ORAL EVERY 4 HOURS PRN
Status: DISCONTINUED | OUTPATIENT
Start: 2021-05-19 | End: 2021-05-19 | Stop reason: HOSPADM

## 2021-05-19 RX ORDER — FENTANYL CITRATE 50 UG/ML
INJECTION, SOLUTION INTRAMUSCULAR; INTRAVENOUS AS NEEDED
Status: DISCONTINUED | OUTPATIENT
Start: 2021-05-19 | End: 2021-05-19

## 2021-05-19 RX ORDER — KETAMINE HYDROCHLORIDE 50 MG/ML
INJECTION, SOLUTION, CONCENTRATE INTRAMUSCULAR; INTRAVENOUS AS NEEDED
Status: DISCONTINUED | OUTPATIENT
Start: 2021-05-19 | End: 2021-05-19

## 2021-05-19 RX ORDER — NEOSTIGMINE METHYLSULFATE 1 MG/ML
INJECTION INTRAVENOUS AS NEEDED
Status: DISCONTINUED | OUTPATIENT
Start: 2021-05-19 | End: 2021-05-19

## 2021-05-19 RX ORDER — DIPHENHYDRAMINE HYDROCHLORIDE 50 MG/ML
INJECTION INTRAMUSCULAR; INTRAVENOUS AS NEEDED
Status: DISCONTINUED | OUTPATIENT
Start: 2021-05-19 | End: 2021-05-19

## 2021-05-19 RX ORDER — ONDANSETRON 2 MG/ML
4 INJECTION INTRAMUSCULAR; INTRAVENOUS EVERY 6 HOURS PRN
Status: DISCONTINUED | OUTPATIENT
Start: 2021-05-19 | End: 2021-05-19 | Stop reason: HOSPADM

## 2021-05-19 RX ORDER — METOCLOPRAMIDE HYDROCHLORIDE 5 MG/ML
INJECTION INTRAMUSCULAR; INTRAVENOUS AS NEEDED
Status: DISCONTINUED | OUTPATIENT
Start: 2021-05-19 | End: 2021-05-19

## 2021-05-19 RX ORDER — IBUPROFEN 600 MG/1
600 TABLET ORAL EVERY 6 HOURS PRN
Status: DISCONTINUED | OUTPATIENT
Start: 2021-05-19 | End: 2021-05-19 | Stop reason: HOSPADM

## 2021-05-19 RX ORDER — SODIUM CHLORIDE, SODIUM LACTATE, POTASSIUM CHLORIDE, CALCIUM CHLORIDE 600; 310; 30; 20 MG/100ML; MG/100ML; MG/100ML; MG/100ML
125 INJECTION, SOLUTION INTRAVENOUS CONTINUOUS
Status: DISCONTINUED | OUTPATIENT
Start: 2021-05-19 | End: 2021-05-19 | Stop reason: HOSPADM

## 2021-05-19 RX ORDER — HYDROMORPHONE HCL/PF 1 MG/ML
0.5 SYRINGE (ML) INJECTION
Status: DISCONTINUED | OUTPATIENT
Start: 2021-05-19 | End: 2021-05-19 | Stop reason: HOSPADM

## 2021-05-19 RX ORDER — ACETAMINOPHEN 500 MG
1000 TABLET ORAL EVERY 8 HOURS SCHEDULED
Qty: 30 TABLET | Refills: 0 | Status: SHIPPED | OUTPATIENT
Start: 2021-05-19 | End: 2022-06-30

## 2021-05-19 RX ORDER — PROPOFOL 10 MG/ML
INJECTION, EMULSION INTRAVENOUS AS NEEDED
Status: DISCONTINUED | OUTPATIENT
Start: 2021-05-19 | End: 2021-05-19

## 2021-05-19 RX ORDER — ACETAMINOPHEN 325 MG/1
650 TABLET ORAL EVERY 6 HOURS PRN
Status: DISCONTINUED | OUTPATIENT
Start: 2021-05-19 | End: 2021-05-19 | Stop reason: HOSPADM

## 2021-05-19 RX ORDER — ONDANSETRON 2 MG/ML
INJECTION INTRAMUSCULAR; INTRAVENOUS AS NEEDED
Status: DISCONTINUED | OUTPATIENT
Start: 2021-05-19 | End: 2021-05-19

## 2021-05-19 RX ORDER — CELECOXIB 100 MG/1
200 CAPSULE ORAL ONCE
Status: COMPLETED | OUTPATIENT
Start: 2021-05-19 | End: 2021-05-19

## 2021-05-19 RX ORDER — BUPIVACAINE HYDROCHLORIDE 2.5 MG/ML
INJECTION, SOLUTION EPIDURAL; INFILTRATION; INTRACAUDAL AS NEEDED
Status: DISCONTINUED | OUTPATIENT
Start: 2021-05-19 | End: 2021-05-19 | Stop reason: HOSPADM

## 2021-05-19 RX ORDER — MIDAZOLAM HYDROCHLORIDE 2 MG/2ML
INJECTION, SOLUTION INTRAMUSCULAR; INTRAVENOUS AS NEEDED
Status: DISCONTINUED | OUTPATIENT
Start: 2021-05-19 | End: 2021-05-19

## 2021-05-19 RX ORDER — SCOLOPAMINE TRANSDERMAL SYSTEM 1 MG/1
1 PATCH, EXTENDED RELEASE TRANSDERMAL ONCE
Status: COMPLETED | OUTPATIENT
Start: 2021-05-19 | End: 2021-05-19

## 2021-05-19 RX ADMIN — KETAMINE HYDROCHLORIDE 20 MG: 50 INJECTION INTRAMUSCULAR; INTRAVENOUS at 09:16

## 2021-05-19 RX ADMIN — ROCURONIUM BROMIDE 20 MG: 10 INJECTION, SOLUTION INTRAVENOUS at 09:13

## 2021-05-19 RX ADMIN — LIDOCAINE HYDROCHLORIDE 50 MG: 10 INJECTION, SOLUTION EPIDURAL; INFILTRATION; INTRACAUDAL at 07:50

## 2021-05-19 RX ADMIN — FENTANYL CITRATE 100 MCG: 50 INJECTION, SOLUTION INTRAMUSCULAR; INTRAVENOUS at 07:50

## 2021-05-19 RX ADMIN — HYDROMORPHONE HYDROCHLORIDE 0.5 MG: 2 INJECTION, SOLUTION INTRAMUSCULAR; INTRAVENOUS; SUBCUTANEOUS at 08:39

## 2021-05-19 RX ADMIN — KETAMINE HYDROCHLORIDE 10 MG: 50 INJECTION INTRAMUSCULAR; INTRAVENOUS at 08:27

## 2021-05-19 RX ADMIN — OXYCODONE HYDROCHLORIDE 5 MG: 5 TABLET ORAL at 15:09

## 2021-05-19 RX ADMIN — CEFAZOLIN SODIUM 2000 MG: 2 SOLUTION INTRAVENOUS at 07:55

## 2021-05-19 RX ADMIN — ONDANSETRON 4 MG: 2 INJECTION INTRAMUSCULAR; INTRAVENOUS at 07:50

## 2021-05-19 RX ADMIN — NEOSTIGMINE METHYLSULFATE 3 MG: 1 INJECTION INTRAVENOUS at 11:28

## 2021-05-19 RX ADMIN — METOCLOPRAMIDE HYDROCHLORIDE 10 MG: 5 INJECTION INTRAMUSCULAR; INTRAVENOUS at 08:00

## 2021-05-19 RX ADMIN — CELECOXIB 200 MG: 100 CAPSULE ORAL at 07:23

## 2021-05-19 RX ADMIN — SODIUM CHLORIDE: 0.9 INJECTION, SOLUTION INTRAVENOUS at 07:53

## 2021-05-19 RX ADMIN — HYDROMORPHONE HYDROCHLORIDE 0.5 MG: 2 INJECTION, SOLUTION INTRAMUSCULAR; INTRAVENOUS; SUBCUTANEOUS at 09:35

## 2021-05-19 RX ADMIN — KETAMINE HYDROCHLORIDE 20 MG: 50 INJECTION INTRAMUSCULAR; INTRAVENOUS at 07:50

## 2021-05-19 RX ADMIN — FENTANYL CITRATE 50 MCG: 50 INJECTION, SOLUTION INTRAMUSCULAR; INTRAVENOUS at 11:32

## 2021-05-19 RX ADMIN — ONDANSETRON 4 MG: 2 INJECTION INTRAMUSCULAR; INTRAVENOUS at 11:26

## 2021-05-19 RX ADMIN — SODIUM CHLORIDE, SODIUM LACTATE, POTASSIUM CHLORIDE, AND CALCIUM CHLORIDE: .6; .31; .03; .02 INJECTION, SOLUTION INTRAVENOUS at 07:47

## 2021-05-19 RX ADMIN — SCOPOLAMINE 1 PATCH: 1 PATCH TRANSDERMAL at 07:56

## 2021-05-19 RX ADMIN — HEPARIN SODIUM 5000 UNITS: 5000 INJECTION INTRAVENOUS; SUBCUTANEOUS at 07:23

## 2021-05-19 RX ADMIN — DIPHENHYDRAMINE HYDROCHLORIDE 12.5 MG: 50 INJECTION, SOLUTION INTRAMUSCULAR; INTRAVENOUS at 08:07

## 2021-05-19 RX ADMIN — DEXAMETHASONE SODIUM PHOSPHATE 4 MG: 4 INJECTION INTRA-ARTICULAR; INTRALESIONAL; INTRAMUSCULAR; INTRAVENOUS; SOFT TISSUE at 07:50

## 2021-05-19 RX ADMIN — ACETAMINOPHEN 975 MG: 325 TABLET, FILM COATED ORAL at 07:23

## 2021-05-19 RX ADMIN — MIDAZOLAM HYDROCHLORIDE 2 MG: 1 INJECTION, SOLUTION INTRAMUSCULAR; INTRAVENOUS at 07:47

## 2021-05-19 RX ADMIN — ROCURONIUM BROMIDE 20 MG: 10 INJECTION, SOLUTION INTRAVENOUS at 08:27

## 2021-05-19 RX ADMIN — PROPOFOL 200 MG: 10 INJECTION, EMULSION INTRAVENOUS at 07:50

## 2021-05-19 RX ADMIN — GLYCOPYRROLATE 0.5 MG: 0.2 INJECTION, SOLUTION INTRAMUSCULAR; INTRAVENOUS at 11:28

## 2021-05-19 RX ADMIN — ROCURONIUM BROMIDE 10 MG: 10 INJECTION, SOLUTION INTRAVENOUS at 10:19

## 2021-05-19 RX ADMIN — ROCURONIUM BROMIDE 50 MG: 10 INJECTION, SOLUTION INTRAVENOUS at 07:50

## 2021-05-19 NOTE — INTERVAL H&P NOTE
H&P Update  5/19/2021    Ms Oralia Hatchet is a 39 y o  here for robotic assisted laparoscopic hysterectomy, bilateral salpingectomy, EUA for AUB refractory to medical management  Patient seen and examined by me in the pre-operative area  Updates, as appropriate, made to medical history, surgical history, social history, medications, and allergies  She has no allergy to latex  There were no vitals filed for this visit  General: well appearing, conversant  Resp: lungs clear bilateral, breathing comfortable  CV: regular rate and rhythm  Abdomen: soft, nontender, no masses  Extremities: no edema, tenderness    Consent previously obtained and available for review at the patient's bedside  Type and screen previously obtained  Day of surgery testing:  - UPT negative  - Vitals pending    Plan to proceed with scheduled surgery

## 2021-05-19 NOTE — ANESTHESIA POSTPROCEDURE EVALUATION
Post-Op Assessment Note    CV Status:  Stable    Pain management: adequate     Hydration Status:  Euvolemic   PONV Controlled:  Controlled   Airway Patency:  Patent      Post Op Vitals Reviewed: Yes      Staff: Anesthesiologist, CRNA   Comments: pt with oral airway, RN in PACU aware        No complications documented      BP   122/60   Temp  98 3   Pulse 86   Resp 10   SpO2   94

## 2021-05-19 NOTE — ANESTHESIA PREPROCEDURE EVALUATION
Procedure:  ROBOTIC ASSISTED LAPAROSCOPIC HYSTERECTOMY BILATERAL SALPINGECTOMY EUA (N/A Abdomen)    Relevant Problems   CARDIO   (+) Hypercholesterolemia      MUSCULOSKELETAL   (+) Acute left-sided low back pain with left-sided sciatica   (+) Lumbar spondylosis      NEURO/PSYCH   (+) Anxiety and depression        Physical Exam    Airway    Mallampati score: II  TM Distance: >3 FB  Neck ROM: full     Dental   No notable dental hx     Cardiovascular      Pulmonary      Other Findings        Anesthesia Plan  ASA Score- 2     Anesthesia Type- general with ASA Monitors  Additional Monitors:   Airway Plan: ETT  Plan Factors-Exercise tolerance (METS): >4 METS  Chart reviewed  Existing labs reviewed  Patient summary reviewed  Patient is not a current smoker  There is medical exclusion for perioperative obstructive sleep apnea risk education  Induction- intravenous  Postoperative Plan- Plan for postoperative opioid use  Informed Consent- Anesthetic plan and risks discussed with patient  I personally reviewed this patient with the CRNA  Discussed and agreed on the Anesthesia Plan with the CRNA  Claudia Chaves

## 2021-05-19 NOTE — OP NOTE
OPERATIVE REPORT  PATIENT NAME: Ashu Andrade    :  1975  MRN: [id number]  Pt Location: AN OR ROOM 04    SURGERY DATE: 2021    Surgeon(s) and Role:     * Radha Watt MD - Primary     * Fausto Moran PA-C - Assisting     * Lenny Mendieta MD - Assisting    Preop Diagnosis:  Abnormal uterine bleeding (AUB) [N93 9]    Post-Op Diagnosis Codes:     * Abnormal uterine bleeding (AUB) [N93 9]    Procedure(s) (LRB):  ROBOTIC ASSISTED LAPAROSCOPIC HYSTERECTOMY BILATERAL SALPINGECTOMY EUA, REMOVAL OF IUD, LYSIS OF ADHESIONS, CYSTOSCOPY (N/A)    Specimen(s):  ID Type Source Tests Collected by Time Destination   1 : uterus, bilateral fallopian tube, cervix Tissue Uterus TISSUE EXAM Radha Watt MD 2021 [id number]        Estimated Blood Loss:   200 mL    Drains:  NG/OG/Enteral Tube Orogastric 18 Fr Center mouth (Active)   Number of days: 0       Urethral Catheter Non-latex 16 Fr  (Active)   Number of days: 0       Anesthesia Type:   General    Operative Indications:  Abnormal uterine bleeding (AUB) [N93 9]      Operative Findings:  Normal external female genitalia  Normal smooth cervix  Uterus anteverted on bimanual without adnexal masses  Uterus sounds to 10 cm  Omental adhesions to the anterior abdominal wall  Dense adhesions of the bladder to the lower uterine segment  Right mid tube densely adhered to right ovary  Bilateral ovaries normal in appearance    Complications:   None    Procedure and Technique:  After the risks, benefits, and alternatives of the procedure were explained and informed consent was signed the patient was taken to the operating room were general anesthesia was administered  When this was found to adequate the patient was positioned in dorsal lithotomy position on a foam pad with Julio stirrup, bilateral arms were tucked and she was prepped and draped in the normal sterile fashion  Fletcher catheter was inserted   A weighted speculum was placed in the posterior vagina  The anterior vagina was retracted with oliav retractor  The cervix was visualized and grasped with a single tooth tenaculum  The uterus sounded to 10 cm  A small Verna cup was felt appropriate  The Verna was assembled and placed in the normal fashion  Attention was then turned to the abdomen for port placement  An incision was placed above the umbilicus  The abdominal wall was tented and a 5-mm OptiView port was placed under direct visualization  The 5 mm camera was then placed through the trocar to confirm intraabdominal placement  Pneumoperitoneum was then achieved using CO2 gas  A survey of the upper abdomen and pelvis was performed, noting no viscus injury below the area of the umbilical port placement  The the robotic arm ports as well as the assistant ports were placed next placed  These were placed under direct visualization, all utilizing the 8mm robotic trocars  2 lateral ports were placed 07BU from the umbilicus and the other robotic arm was placed on the patients left, 10cm lateral from the initial lateral port  A 5 mm assistant port was placed 10 cm lateral to right robotic port  5 mm optiview was replaced with robotic port under direct visualization  Once all of the trocars were place the patient was positioned in deep Trendelenburg position  The robot was then docked in the standard fashion  A vessel sealer and Monopolar scissor instruments were placed at the robotic arms  Fourth arm with a grasper  The omental adhesions were taken down with the monopolar scissors and vessel sealer care taken to ensure no bowel adhesions  Uterus was elevated and a survey of the uterus, fallopian tubes, and bilateral ovaries was performed  Additionally, bilateral ureters were visualized menchaca-retroperitoneally  At the console, another survey of the pelvis was performed  The above findings were visualized  Next attention was paid bilateral round ligaments   These were cauterized and transected using the vessel sealer  Serial cauterization and transection was performed through the mesosalpinx to allow for removal of the left fallopian tube  The right tube was adherent to the right ovary and the right fimbria removed as to not threaten ovarian blood supply by full tube removal  Next the utero-ovarian ligaments were identified, cauterized and transected  The anterior and posterior leafs of bilateral broad ligaments were  using blunt dissection  The uterus was anteverted and the posterior leaf was transected to the level of the internal cervical os using the monopolar  A bladder flap was created by transection of the vesicoperitoneal reflection across the anterior cervix  The endopelvic fascia was identified and the bladder flap created mobilizing the bladder inferiorly below the cervix  The bladder was densely adherent to the uterus  The top of the KOH cap was noted anteriorly and the bladder was  approximately one cm distal  Anterior colpotomy was made  Attention was then turned to skeletonization of the uterine arteries on both sides  Successive cautery was done with the vessel sealer cephalad to control back-bleeding and caudal sliding off the uterus and serially cauterizing the vessels and the superior portion of the cardinal ligaments  A vessel at the right lateral portion of the bladder serosa was noted to have small amount of brisk bleeding, this was made hemostatic with the vessel sealer  Good hemostasis was noted  The vagina was incised posteriorly on top of the KOH cap with the monopolar scissers  The remaining cardinal ligament remnant were taken down with the vessel sealer and the remainder of the colpotomy completed  When the uterus and cervix were completely transected, the uterus and cervix were removed manually through the vaginal cuff and vagina  Attention was then paid to the vaginal cuff  This was closed in a running fashion using 2-0 stratafix suture   Hemostasis was noted at the end of cuff closure  Next cystoscopy was performed in the standard fashion using 5 mm laparoscope  Bilateral ureteral jets were visualized  A survey of the entire bladder was negative for injury  Next the pelvis was again surveyed laparoscopically and copiously irrigated, again noting good hemostasis  At the end of all of the procedures, the trocars were removed and the remaining CO2 gas was released from the patients abdomen  The port sites were closed using 4-0 monocryl and dressed with exofin  All sponge, lap, and needle counts were correct at the end of the procedure  The patient was taken to recovery in stable condition       Lesa Osman MD   I was present for the entire procedure    Patient Disposition:  PACU then home    SIGNATURE: Lesa Osman MD  DATE: May 19, 2021  TIME: 11:23 AM

## 2021-05-19 NOTE — DISCHARGE INSTRUCTIONS
Robot Assisted Laparoscopic Hysterectomy   WHAT YOU SHOULD KNOW:   Robot-assisted laparoscopic hysterectomy (RH) is surgery to remove your uterus and cervix using a machine controlled by your surgeon  Your ovaries, fallopian tubes, supporting tissues, some lymph nodes, and the top of your vagina may also be removed  After RH, you will not be able to become pregnant  You will go through menopause if your ovaries are removed  AFTER YOU LEAVE:   Medicines:  · Medicines  may be given to decrease pain or prevent a bacterial infection  You may also need to take hormone medicine such as estrogen  Ask your healthcare provider how to take this medicine safely  · Take your medicine as directed  Call your healthcare provider if you think your medicine is not helping or if you have side effects  Tell him if you are allergic to any medicine  Keep a list of the medicines, vitamins, and herbs you take  Include the amounts, and when and why you take them  Bring the list or the pill bottles to follow-up visits  Carry your medicine list with you in case of an emergency  Activity guidelines:   · You may feel like resting more after surgery  Slowly start to do more each day  Rest when you feel it is needed  · Ask when you can start having sexual intercourse again  What to expect after surgery: It is normal to bleed from your vagina after your uterus and cervix are removed  Change the sanitary pad often to prevent infection  Ask your gynecologist or healthcare provider how much bleeding to expect  Contact your healthcare provider if:   · You have a fever  · Your pain is getting worse, even after you take medicine  · Your incisions look red and swollen, or they have bad-smelling drainage coming from them  · You see new or an increased amount of bright red blood coming from your vagina or your incisions  · You have yellow, green, or bad-smelling discharge coming from your vagina      · You feel pain when you urinate or you need to urinate more often than usual     · You have trouble having a bowel movement  · Your skin is itchy, swollen, or has a rash  · You have questions or concerns about your condition or care  Seek care immediately or call 911 if:   · You feel lightheaded, short of breath, and have chest pain  · You cough up blood  · Your arm or leg feels warm, tender, and painful  It may look swollen and red  · You have more bleeding from your vagina than you were told to expect  © 2014 [address] [employer] is for End User's use only and may not be sold, redistributed or otherwise used for commercial purposes  All illustrations and images included in CareNotes® are the copyrighted property of [employer] , [employer]  or Akash Terry  The above information is an  only  It is not intended as medical advice for individual conditions or treatments  Talk to your doctor, nurse or pharmacist before following any medical regimen to see if it is safe and effective for you

## 2021-05-21 ENCOUNTER — TELEPHONE (OUTPATIENT)
Dept: OTHER | Facility: HOSPITAL | Age: 46
End: 2021-05-21

## 2021-05-21 NOTE — TELEPHONE ENCOUNTER
Called patient to check in on post op (robot on Weds)  No answer  Left VM for her to call back and let us know how she is doing  Path resulted and is benign  She can be informed if she calls back with update  Thanks!

## 2021-05-25 ENCOUNTER — TELEPHONE (OUTPATIENT)
Dept: PAIN MEDICINE | Facility: CLINIC | Age: 46
End: 2021-05-25

## 2021-05-25 DIAGNOSIS — M54.16 LUMBAR RADICULOPATHY: ICD-10-CM

## 2021-05-25 RX ORDER — GABAPENTIN 300 MG/1
600 CAPSULE ORAL 3 TIMES DAILY
Qty: 180 CAPSULE | Refills: 2 | Status: SHIPPED | OUTPATIENT
Start: 2021-05-25 | End: 2021-06-30

## 2021-05-25 NOTE — TELEPHONE ENCOUNTER
Med refill   Name of medication:gabapentin (NEURONTIN) 300 mg capsule     Frequency:Take 2 capsules (600 mg total) by mouth 3 (three) times a day    How many tablets left:6    Pharmacy: [id number]  [address] [address] [facility] call back # [phone]

## 2021-05-26 NOTE — TELEPHONE ENCOUNTER
Spoke to pt and she said she called back and spoke to someone who gave her results  She said she is a little sore and bruised but ok

## 2021-06-04 ENCOUNTER — OFFICE VISIT (OUTPATIENT)
Dept: OBGYN CLINIC | Facility: MEDICAL CENTER | Age: 46
End: 2021-06-04

## 2021-06-04 VITALS — BODY MASS INDEX: 35.91 KG/M2 | SYSTOLIC BLOOD PRESSURE: 128 MMHG | DIASTOLIC BLOOD PRESSURE: 82 MMHG | WEIGHT: 215.8 LBS

## 2021-06-04 DIAGNOSIS — Z90.710 HISTORY OF ROBOT-ASSISTED LAPAROSCOPIC HYSTERECTOMY: Primary | ICD-10-CM

## 2021-06-04 PROBLEM — Z97.5 IUD (INTRAUTERINE DEVICE) IN PLACE: Status: RESOLVED | Noted: 2020-05-15 | Resolved: 2021-06-04

## 2021-06-04 PROBLEM — N93.9 ABNORMAL UTERINE BLEEDING: Status: RESOLVED | Noted: 2020-01-10 | Resolved: 2021-06-04

## 2021-06-04 PROBLEM — N93.9 ABNORMAL UTERINE BLEEDING (AUB): Status: RESOLVED | Noted: 2020-03-02 | Resolved: 2021-06-04

## 2021-06-04 PROCEDURE — 99024 POSTOP FOLLOW-UP VISIT: CPT | Performed by: STUDENT IN AN ORGANIZED HEALTH CARE EDUCATION/TRAINING PROGRAM

## 2021-06-04 NOTE — ASSESSMENT & PLAN NOTE
No bleeding  Meeting milestones  Tenderness just inferior to umbilicus deviated to right  No herniation, guarding or rebound  Advised to monitor closely  Cuff exam in 4 wks

## 2021-06-04 NOTE — PROGRESS NOTES
Assessment/Plan:   History of robot-assisted laparoscopic hysterectomy  No bleeding  Meeting milestones  Tenderness just inferior to umbilicus deviated to right  No herniation, guarding or rebound  Advised to monitor closely  Cuff exam in 4 wks  Diagnoses and all orders for this visit:    History of robot-assisted laparoscopic hysterectomy          Subjective:     Patient ID: Samuel Wills is a 39 y o  female  40 yo 2 wks post op from robotic hysterectomy here for post op visit  Pain is well controlled  One area under he belly button that is particularly sore  No nausea or vomiting  No fevers or chills  Urinating without issue- right after surgery some frequency that resolved  BM normal  Rare NSAID or OTC pain med use  No bleeding or other concerns  Her mom  right before surgery unexpectedly and her celebration of life is this weekend  Review of Systems   Constitutional: Negative for chills and fever  HENT: Negative for ear pain and sore throat  Eyes: Negative for pain and visual disturbance  Respiratory: Negative for cough and shortness of breath  Cardiovascular: Negative for chest pain and palpitations  Gastrointestinal: Positive for abdominal pain  Negative for constipation, diarrhea, nausea and vomiting  Genitourinary: Negative for dyspareunia, dysuria, frequency, hematuria, pelvic pain, urgency, vaginal bleeding, vaginal discharge and vaginal pain  Musculoskeletal: Negative for arthralgias and back pain  Skin: Negative for color change and rash  Neurological: Negative for seizures and syncope  All other systems reviewed and are negative  Objective:     Physical Exam  Vitals signs reviewed  Constitutional:       General: She is not in acute distress  Appearance: She is well-developed  She is not diaphoretic  HENT:      Head: Normocephalic and atraumatic  Neck:      Musculoskeletal: Normal range of motion     Pulmonary:      Effort: Pulmonary effort is normal  No respiratory distress  Abdominal:      Palpations: Abdomen is soft  There is no mass  Hernia: No hernia is present  Neurological:      Mental Status: She is alert and oriented to person, place, and time  Psychiatric:         Behavior: Behavior normal          Thought Content:  Thought content normal          Judgment: Judgment normal

## 2021-06-21 ENCOUNTER — TELEPHONE (OUTPATIENT)
Dept: OBGYN CLINIC | Facility: MEDICAL CENTER | Age: 46
End: 2021-06-21

## 2021-06-21 NOTE — TELEPHONE ENCOUNTER
Letter faxed to [facility] stating patient is scheduled for a f/u post operative visit on 7/12/2021

## 2021-06-21 NOTE — TELEPHONE ENCOUNTER
Pt was approved for her disability until 6/29 after having her hysterectomy on 5/19  She is coming in for her post op f/u appointment on 7/14 with Dr Severo Milligan and would need a letter sent to her disability with this date on it

## 2021-06-21 NOTE — LETTER
June 21, 2021     Patient: Gonzales Cho   YOB: 1975   Date of Visit: 6/21/2021       To Whom it May Concern:    Doug Coil is under my professional care  This patient is scheduled for an appt on 7/14/2021 for a post operative evaluation  If you have any questions or concerns, please don't hesitate to call        Sincerely,        Rahel Cortes MD

## 2021-06-30 ENCOUNTER — OFFICE VISIT (OUTPATIENT)
Dept: PAIN MEDICINE | Facility: CLINIC | Age: 46
End: 2021-06-30
Payer: COMMERCIAL

## 2021-06-30 VITALS
BODY MASS INDEX: 35.82 KG/M2 | HEIGHT: 65 IN | WEIGHT: 215 LBS | HEART RATE: 93 BPM | DIASTOLIC BLOOD PRESSURE: 82 MMHG | SYSTOLIC BLOOD PRESSURE: 134 MMHG

## 2021-06-30 DIAGNOSIS — M48.061 SPINAL STENOSIS OF LUMBAR REGION, UNSPECIFIED WHETHER NEUROGENIC CLAUDICATION PRESENT: ICD-10-CM

## 2021-06-30 DIAGNOSIS — M47.816 LUMBAR SPONDYLOSIS: ICD-10-CM

## 2021-06-30 DIAGNOSIS — M54.41 CHRONIC RIGHT-SIDED LOW BACK PAIN WITH RIGHT-SIDED SCIATICA: ICD-10-CM

## 2021-06-30 DIAGNOSIS — M54.16 LUMBAR RADICULOPATHY: ICD-10-CM

## 2021-06-30 DIAGNOSIS — G89.4 CHRONIC PAIN SYNDROME: Primary | ICD-10-CM

## 2021-06-30 DIAGNOSIS — G89.29 CHRONIC RIGHT-SIDED LOW BACK PAIN WITH RIGHT-SIDED SCIATICA: ICD-10-CM

## 2021-06-30 DIAGNOSIS — M51.26 LUMBAR DISC HERNIATION: ICD-10-CM

## 2021-06-30 PROCEDURE — 1036F TOBACCO NON-USER: CPT | Performed by: NURSE PRACTITIONER

## 2021-06-30 PROCEDURE — 99214 OFFICE O/P EST MOD 30 MIN: CPT | Performed by: NURSE PRACTITIONER

## 2021-06-30 PROCEDURE — 3008F BODY MASS INDEX DOCD: CPT | Performed by: NURSE PRACTITIONER

## 2021-06-30 RX ORDER — PREGABALIN 50 MG/1
50 CAPSULE ORAL 3 TIMES DAILY
Qty: 90 CAPSULE | Refills: 1 | Status: SHIPPED | OUTPATIENT
Start: 2021-06-30 | End: 2021-08-20

## 2021-06-30 NOTE — PATIENT INSTRUCTIONS
Gabapentin 300mg capsules  Decrease to 2 in the morning, 1 in the afternoon and 2 at bedtime x 3 days  Decrease to 1 in the morning, 1 in the afternoon and 2 at bedtime x 3 days  Decrease to 1 capsule three times a day x 3 days  At this point, stop gabapentin and start pregabalin 50mg 1 capsule three times a day    Pregabalin (By mouth)   Pregabalin ([id number])  Treats nerve and muscle pain, including fibromyalgia  Also treats partial-onset seizures  Brand Name(s): Lyrica, Lyrica CR   There may be other brand names for this medicine  When This Medicine Should Not Be Used: This medicine is not right for everyone  Do not use it if you had an allergic reaction to pregabalin  How to Use This Medicine:   Capsule, Liquid, Long Acting Tablet  · Take your medicine as directed  Your dose may need to be changed several times to find what works best for you  · Extended-release tablet: Swallow the extended-release tablet whole  Do not crush, break, or chew it  Take it after an evening meal   · Oral liquid: Measure the oral liquid medicine with a marked measuring spoon, oral syringe, or medicine cup  · This medicine should come with a Medication Guide  Ask your pharmacist for a copy if you do not have one  · Missed dose: Take a dose as soon as you remember  If it is almost time for your next dose, wait until then and take a regular dose  Do not take extra medicine to make up for a missed dose  If you miss a dose of the extended-release tablet after your evening meal, take it before bedtime after a snack  If you miss the dose before bedtime, take it after your morning meal  If you do not take the dose the following morning, then take the next dose at your regular time after your evening meal  Do not take 2 doses at the same time  · Store the medicine in a closed container at room temperature, away from heat, moisture, and direct light    Drugs and Foods to Avoid:   Ask your doctor or pharmacist before using any other medicine, including over-the-counter medicines, vitamins, and herbal products  · Some medicines can affect how pregabalin works  Tell your doctor if you are using any of the following:   ? ACE inhibitor (including benazepril, enalapril, lisinopril, quinapril, ramipril)  ? Oral diabetes medicine (including metformin, pioglitazone, rosiglitazone)  · Do not drink alcohol while you are using this medicine  · Tell your doctor if you use anything else that makes you sleepy  Some examples are allergy medicine, narcotic pain medicine, and alcohol  Tell your doctor if you are also using oxycodone, lorazepam, or zolpidem  Warnings While Using This Medicine:   · Tell your doctor if you are pregnant or breastfeeding, or if you have kidney disease, heart failure, heart rhythm problems, lung or breathing problems, a bleeding disorder, diabetes, sores or skin problems, or a low blood platelet count  Tell your doctor if you have a history of angioedema (severe swelling), alcohol or drug abuse, depression, or other mood problems  · This medicine may cause the following problems:   ? Angioedema (severe swelling), which may be life-threatening  ? Changes in mood or behavior, including suicidal thoughts or behavior  ? Respiratory depression (serious breathing problem that can be life-threatening), when used with narcotic pain medicines  ? Peripheral edema (swelling of your hands, ankles, feet, or lower legs)  ? Increased risk for cancer and bleeding  ? Serious muscle problems  ? Heart rhythm changes  · This medicine may make you dizzy or drowsy  It may also cause blurry or double vision  Do not drive or do anything else that could be dangerous until you know how this medicine affects you  · Do not stop using this medicine suddenly  Your doctor will need to slowly decrease your dose before you stop it completely  · Your doctor will do lab tests at regular visits to check on the effects of this medicine   Keep all appointments  · Keep all medicine out of the reach of children  Never share your medicine with anyone  Possible Side Effects While Using This Medicine:   Call your doctor right away if you notice any of these side effects:  · Allergic reaction: Itching or hives, swelling in your face or hands, swelling or tingling in your mouth or throat, chest tightness, trouble breathing  · Blistering, peeling, red skin rash  · Blue lips, fingernails, or skin, trouble breathing, chest pain  · Blurry or double vision  · Fever, chills, cough, sore throat, body aches  · Muscle pain, tenderness, or weakness, general feeling of illness  · Rapid weight gain, swelling in your hands, ankles, or feet  · Severe dizziness or drowsiness  · Sudden mood changes, unusual moods or behavior, including extreme happiness or depression, thoughts or attempts of killing oneself  · Swelling in your throat, head, or neck  · Uneven heartbeat  · Unusual bleeding, bruising, or weakness  If you notice these less serious side effects, talk with your doctor:   · Confusion, trouble concentrating  · Constipation  · Dry mouth  If you notice other side effects that you think are caused by this medicine, tell your doctor  Call your doctor for medical advice about side effects  You may report side effects to FDA at 1-377-FDA-2818  © Copyright [employer] 2021 Information is for End User's use only and may not be sold, redistributed or otherwise used for commercial purposes  The above information is an  only  It is not intended as medical advice for individual conditions or treatments  Talk to your doctor, nurse or pharmacist before following any medical regimen to see if it is safe and effective for you

## 2021-06-30 NOTE — PROGRESS NOTES
Assessment:  1  Chronic pain syndrome    2  Chronic right-sided low back pain with right-sided sciatica    3  Lumbar radiculopathy    4  Lumbar spondylosis    5  Lumbar disc herniation    6  Spinal stenosis of lumbar region, unspecified whether neurogenic claudication present        Plan:  1  I will schedule the patient for a right L5 TFESI to address the inflammatory component the patient's pain  Complete risks and benefits including bleeding, infection, tissue reaction, nerve injury and allergic reaction were discussed  The patient was agreeable and verbalized an understanding  2  I will discontinue gabapentin and trial the patient on pregabalin 50 mg 3 times a day for pain complaints  She was instructed on how to wean off of gabapentin in onto pregabalin  I discussed with the patient the type of medication it is, how it works, and that it requires a titration process that is specific to each individual  I reviewed with the patient that it may take 3-4 weeks for the medication's effects to be noticed and that it should never be abruptly stopped  Possible side effects include but are not limited to; vertigo, lethargy, nausea, and edema of the extremities  Advised the patient to call our office if they experience any side effects  The patient verbalized an understanding      3  Patient will reinitiate in physical therapy  4  May consider right L3-5 medial branch blocks depending on response to repeat epidural steroid injection  5  The patient may continue ibuprofen 800 mg q 8 hours p r n  6  we can repeat left L3 and L4 TFESI p r n   7  Patient will follow-up after her procedure or sooner if needed     [employer] software was used to dictate this note  It may contain errors with dictating incorrect words or incorrect spelling  Please contact the provider directly with any questions  History of Present Illness:     The patient is a 39 y o  female with a history of previous lumbar surgeries last seen on 4/2/21 who presents for a follow up office visit in regards to chronic low back pain that is now radiating into the right buttock and occasionally into the posterior aspect of the right thigh secondary to  Lumbar degenerative disc disease, lumbar spondylosis, lumbar radiculopathy and chronic pain syndrome  The patient denies left lower extremity symptoms, bowel or bladder incontinence or saddle anesthesia  Her left lower extremity symptoms resolved with left L3 and L4 TFESI  She is status post bilateral L5 TFESI with minimal improvement of her pain  She participated in physical therapy prior to consultation with our office with mild relief  She is taking gabapentin 600 mg 3 times a day and ibuprofen p r n  with 25% improvement of her pain without side effects  She rates her pain an 8/10 on the numeric pain rating scale  She states her pain is intermittent in nature and bothersome throughout the entirety of the day  She characterizes the pain as sharp, pressure like and shooting    I have personally reviewed and/or updated the patient's past medical history, past surgical history, family history, social history, current medications, allergies, and vital signs today  Review of Systems:    Review of Systems   Respiratory: Negative for shortness of breath  Cardiovascular: Negative for chest pain  Gastrointestinal: Negative for constipation, diarrhea, nausea and vomiting  Musculoskeletal: Negative for arthralgias, gait problem, joint swelling and myalgias  Skin: Negative for rash  Neurological: Negative for dizziness, seizures and weakness  All other systems reviewed and are negative          Past Medical History:   Diagnosis Date    Back disorder     Depression     PONV (postoperative nausea and vomiting)        Past Surgical History:   Procedure Laterality Date    BACK SURGERY      Lumbar( L3)     SECTION  7065,6491    X2    CHOLECYSTECTOMY      ENDOMETRIAL BIOPSY      EXAMINATION UNDER ANESTHESIA N/A 5/15/2020    Procedure: EXAM UNDER ANESTHESIA (EUA);   Surgeon: Thad Albert MD;  [facility];  Service: Gynecology    INDUCED       times 2    SC HYSTEROSCOPY,W/ENDO BX N/A 5/15/2020    Procedure: DILATATION AND CURETTAGE (D&C) WITH HYSTEROSCOPY;  Surgeon: Thad Albert MD;  [facility];  Service: Gynecology    SC INSERT INTRAUTERINE DEVICE N/A 5/15/2020    Procedure: Mirena insertion;  Surgeon: Thad Albert MD;  [facility];  Service: Gynecology    SC LAPAROSCOPY W TOT HYSTERECTUTERUS <=250 GRAM  W TUBE/OVARY N/A 2021    Procedure: ROBOTIC ASSISTED LAPAROSCOPIC HYSTERECTOMY BILATERAL SALPINGECTOMY EUA, REMOVAL OF IUD, LYSIS OF ADHESIONS, CYSTOSCOPY;  Surgeon: Thad Albert MD;  [facility];  Service: Gynecology    TUBAL LIGATION      WISDOM TOOTH EXTRACTION  10/05/2020       Family History   Problem Relation Age of Onset    Diabetes Mother     Hypertension Mother     Anxiety disorder Family         symptom    Other Family         breast disorders    Heart disease Other         cardiac disorder    Heart disease Other         cardiac disorder    Depression Father     Depression Sister        Social History     Occupational History    Occupation: currently works full-time   Tobacco Use    Smoking status: Never Smoker    Smokeless tobacco: Never Used   Vaping Use    Vaping Use: Never used   Substance and Sexual Activity    Alcohol use: Not Currently    Drug use: No    Sexual activity: Yes     Birth control/protection: Surgical, Female Sterilization     Comment: Tubal ligations         Current Outpatient Medications:     cyclobenzaprine (FLEXERIL) 5 mg tablet, Take 1 tablet (5 mg total) by mouth 3 (three) times a day as needed for muscle spasms, Disp: 90 tablet, Rfl: 1    escitalopram (LEXAPRO) 10 mg tablet, Take 1 tablet (10 mg total) by mouth daily at bedtime, Disp: 90 tablet, Rfl: 1    ibuprofen (MOTRIN) 800 mg tablet, Take 1 tablet (800 mg total) by mouth every 8 (eight) hours, Disp: 30 tablet, Rfl: 0    multivitamin-minerals (CENTRUM ADULTS) tablet, Take 1 tablet by mouth daily, Disp: , Rfl:     rosuvastatin (CRESTOR) 10 MG tablet, Take 1 tablet (10 mg total) by mouth daily (Patient taking differently: Take 10 mg by mouth daily at bedtime ), Disp: 90 tablet, Rfl: 3    acetaminophen (TYLENOL) 500 mg tablet, Take 2 tablets (1,000 mg total) by mouth every 8 (eight) hours (Patient not taking: Reported on 6/4/2021), Disp: 30 tablet, Rfl: 0    pregabalin (LYRICA) 50 mg capsule, Take 1 capsule (50 mg total) by mouth 3 (three) times a day, Disp: 90 capsule, Rfl: 1    Allergies   Allergen Reactions    Amoxicillin Rash       Physical Exam:    /82   Pulse 93   Ht 5' 5" (1 651 m)   Wt 97 5 kg (215 lb)   LMP 05/15/2020   BMI 35 78 kg/m²     Constitutional:normal, well developed, well nourished, alert, in no distress and non-toxic and no overt pain behavior  Eyes:anicteric  HEENT:grossly intact  Neck:supple, symmetric, trachea midline and no masses   Pulmonary:even and unlabored  Cardiovascular:No edema or pitting edema present  Skin:Normal without rashes or lesions and well hydrated  Psychiatric:Mood and affect appropriate  Neurologic:Cranial Nerves II-XII grossly intact  Musculoskeletal:normal gait  Bilateral lumbar paraspinal musculature minimally tender to palpation  Right SI joint nontender to palpation  Bilateral lower extremity  strength 5/5 in all muscle groups  Positive straight leg raise on the right negative on the left  Negative Robb's test, AP compression and Gaenslen's test bilaterally  Negative piriformis stretch test on the right  Lumbar facet loading maneuvers do reproduce low back pain      Imaging  FL spine and pain procedure    (Results Pending)     MRI LUMBAR SPINE WITHOUT CONTRAST   INDICATION: M54 42: Lumbago with sciatica, left side   M54 10: Radiculopathy, site unspecified  Acute left-sided low back pain with left-sided sciatica  COMPARISON: None  TECHNIQUE: Sagittal T1, sagittal T2, sagittal inversion recovery, axial T1 and axial T2, coronal T2    IMAGE QUALITY: Diagnostic   FINDINGS:   VERTEBRAL BODIES: There is a transitional lumbosacral junction  S1 is a transitional level and is lumbarized  There is a hypoplastic but complete disc space at S1-2  Slight retrolisthesis L3 on 4  Endplate degenerative marrow signal L5-S1  SACRUM: Normal signal within the sacrum  No evidence of insufficiency or stress fracture  DISTAL CORD AND CONUS: Normal size and signal within the distal cord and conus  PARASPINAL SOFT TISSUES: Paraspinal soft tissues are unremarkable  LOWER THORACIC DISC SPACES: Normal disc height and signal  No disc herniation, canal stenosis or foraminal narrowing  LUMBAR DISC SPACES:   L1-L2: No significant central or foraminal narrowing  L2-L3: Moderate facet hypertrophy  No significant central or foraminal narrowing  L3-L4: Inferiorly extruded left paramedian disc herniation descends 1 cm below the native disc margin  Severe left lateral recess stenosis is noted  Correlate for left L4 radiculopathy  Moderate central stenosis  Foramina are patent  L4-L5: Small marginal osteophytes bilaterally and facet hypertrophy combined result in minimal foraminal narrowing  Central canal patent  L5-S1: Severe facet hypertrophy  There is degenerative disc osteophyte complex with marginal osteophytes resulting in moderate bilateral foraminal narrowing and mild lateral recess stenosis  S1-S2: This is a transitional level  No significant central stenosis  Mild left foraminal narrowing  Moderate facet hypertrophy  IMPRESSION:   Inferiorly extruded left paramedian disc herniation L3-4 results in severe lateral recess stenosis  Correlate for left L4 radiculopathy  Moderate central stenosis also noted  Transitional lumbosacral junction noted with a lumbarized S1 level  Degenerative changes are seen elsewhere as detailed above        Orders Placed This Encounter   Procedures    FL spine and pain procedure    Ambulatory referral to Physical Therapy

## 2021-07-08 ENCOUNTER — EVALUATION (OUTPATIENT)
Dept: PHYSICAL THERAPY | Age: 46
End: 2021-07-08
Payer: COMMERCIAL

## 2021-07-08 DIAGNOSIS — G89.29 CHRONIC RIGHT-SIDED LOW BACK PAIN WITH RIGHT-SIDED SCIATICA: Primary | ICD-10-CM

## 2021-07-08 DIAGNOSIS — M54.41 CHRONIC RIGHT-SIDED LOW BACK PAIN WITH RIGHT-SIDED SCIATICA: Primary | ICD-10-CM

## 2021-07-08 PROCEDURE — 97112 NEUROMUSCULAR REEDUCATION: CPT | Performed by: PHYSICAL THERAPIST

## 2021-07-08 PROCEDURE — 97110 THERAPEUTIC EXERCISES: CPT | Performed by: PHYSICAL THERAPIST

## 2021-07-08 PROCEDURE — 97162 PT EVAL MOD COMPLEX 30 MIN: CPT | Performed by: PHYSICAL THERAPIST

## 2021-07-08 NOTE — PROGRESS NOTES
PT Evaluation     Today's date: 2021  Patient name: Binu Watson  : 1975  MRN: [id number]  Referring provider: CHAKA Burroughs  Dx:   Encounter Diagnosis     ICD-10-CM    1  Chronic right-sided low back pain with right-sided sciatica  M54 41 Ambulatory referral to Physical Therapy    G89 [id number]        Start Time: 0900  Stop Time: 1000  Total time in clinic (min): 60 minutes    Assessment  Assessment details: Tracy Amezquita is a 40 yo female presenting with complaints of bilateral LBP at the lumbosacral junction with R sided radicular sx's of an unspecific lumbosacral region  Pt demonstrates chronic pain, decreased core stability, decreased sacral joint mobility, and decreased activation of gluteal musculature leading to limitations with IADL's, household work, standing, walking, and lifting at work  Pt would benefit from skilled physical therapy interventions in order to improve neuromuscular control of deep core and posterior chain, decrease pain with function and build activity tolerance in order to return to work  No further referral appears necessary at this time based on examination results  Prognosis is good given HEP compliance and PT 1-2/wk  Positive prognostic indicators include positive attitude toward recovery  Please contact me if you have any questions or recommendations  Thank you for the opportunity to share in [facility] care     Impairments: abnormal coordination, abnormal muscle firing, abnormal or restricted ROM, activity intolerance, impaired physical strength, lacks appropriate home exercise program, pain with function and weight-bearing intolerance  Functional limitations: position changes, bed mobility, standing, walking, bending, liftingUnderstanding of Dx/Px/POC: good   Prognosis: good    Plan  Patient would benefit from: skilled physical therapy  Referral necessary: No  Planned therapy interventions: abdominal trunk stabilization, activity modification, ADL retraining, balance/weight bearing training, body mechanics training, coordination, flexibility, functional ROM exercises, graded activity, graded exercise, home exercise program, IADL retraining, joint mobilization, manual therapy, neuromuscular re-education, patient education, postural training, strengthening, stretching, therapeutic activities and therapeutic exercise  Frequency: 2x week  Duration in weeks: 12  Plan of Care beginning date: 2021  Treatment plan discussed with: patient        Subjective Evaluation    History of Present Illness  Date of onset: 2021  Mechanism of injury: Pt reports R sided low back pain that radiates down into R buttock and posterior aspect of R leg, stopping at the knee  Pt had previous episode of LBP with radicular sx's on the L side  She received injections and physical therapy for L side, and the episode has since resolved  Pt reports her pain feels like it is in her tailbone  She had b/l injections at S1 level, but has not had any pain relief since  She will be receiving another injection next week on the R side only  Pt has hx of back surgery including laminectomy and discectomy at levels L3-S1  Pt could not remember exactly what procedures were done at which levels  Difficult activities include long duration standing and walking, bending, lifting, lifting leg to get into and out of car, transitioning between positions, and bed mobility  Pain is worst at night and in the morning  Reports it tends to feel better when she is moving throughout the day  Pt denies saddle anesthesia, numbness in distal feet, bowl/bladder sx's  States sx's increase with coughing and sneezing  States a particular increase in pain with taking pills in the morning, likely due to cervical extension             Recurrent probem    Quality of life: good    Pain  Current pain ratin  At best pain ratin  At worst pain ratin  Location: lumbar/sacral region, R glute, posterior aspect of R leg, stops at R knee  Quality: sharp  Relieving factors: medications  Aggravating factors: sitting, standing, overhead activity, stair climbing, walking, lifting and running  Progression: no change    Social Support  Steps to enter house: yes  Stairs in house: yes   Lives in: multiple-level home    Employment status: not working  Exercise history: walking, old physical therapy exercises    Treatments  Previous treatment: injection treatment, medication and physical therapy  Current treatment: medication  Patient Goals  Patient goals for therapy: decreased pain, increased motion, increased strength, independence with ADLs/IADLs and return to work  Patient goal: walking, 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 SB Left 100   SB Right 50p! ROT Left 75p! ROT Right 100   Repetitive testing: extension in standing= intensifies LBP, no radicular sx's present     extension in lying= intensifies LBP, no radicular sx's present  flexion standing= no change in sx's       flexion in lying= increased radicular sx's in R buttock          MMT         AROM    Hip       L       R        L           R   Flex  4 4     Extn  3- 4-     Abd  4 4     Add  4+ 4+     IR  4+ 4+     ER  4 4                   MMT    Knee         L        R   Flex  4+ 4+   Extn   4+ 4+                     MMT    Ankle       L        R   PF 4+ 4+   DF  4+ 4+   EHL 4+ 4+     Posture: no effect  Dermatome: (light touch) L2: normal   L3:normal    L4:normal     L5:normal     S1:normal       S2:normal   Reflexes:  (L/R) L4: 1  BLE     S1:  1 BLE      Clonus= neg  Slump test: L= neg    R= neg      Straight leg raise:   L=  neg    R=  neg           Transverse abdominis: Bent knee fall out= fair      Hip/SI joint:  long axis distraction (hip) = no change in sx's      Active SLR= negative Active SLR with stabilization =   negative        Passive SLR = >90 deg       Leg length =  symmetrical         SI compression= negative     SI distraction = positive          Hamstring dominance test= positive b/l      Joint mobility = Sacral stiffness    Precautions: hx of L3 surgery    Patient provided verbal consent to treatment plan and recommended interventions  Manual 7/8                                       Neuro Re-Ed        BKFO 1x10       Glute sets 1x10                                               There Ex        Single knee to chest 1x10 ea       Pt edu KF                                       There Activity                Gait Training                Modalities                          Short Term:  1  Pt will report decreased levels of pain by at least 2 subjective ratings in 4 weeks  2  Pt will demonstrate improved ROM by at least 10 degrees in 4 weeks  3  Pt will demonstrate improved strength by 1/2 grade in 4 weeks  4  Pt will be able to return to lift 10lbs without pain in 4 weeks in order to return to work  Long Term:   1  Pt will be independent in their HEP in 8 weeks  2  Pt will be pain free with IADL's in 8 weeks  3  Pt will demonstrate improved FOTO, > 10 in 8 weeks    4  Pt will be able to walk 1 mile in 8 weeks

## 2021-07-12 ENCOUNTER — OFFICE VISIT (OUTPATIENT)
Dept: PHYSICAL THERAPY | Age: 46
End: 2021-07-12
Payer: COMMERCIAL

## 2021-07-12 DIAGNOSIS — G89.29 CHRONIC RIGHT-SIDED LOW BACK PAIN WITH RIGHT-SIDED SCIATICA: Primary | ICD-10-CM

## 2021-07-12 DIAGNOSIS — M54.41 CHRONIC RIGHT-SIDED LOW BACK PAIN WITH RIGHT-SIDED SCIATICA: Primary | ICD-10-CM

## 2021-07-12 PROCEDURE — 97110 THERAPEUTIC EXERCISES: CPT | Performed by: PHYSICAL THERAPIST

## 2021-07-12 PROCEDURE — 97112 NEUROMUSCULAR REEDUCATION: CPT | Performed by: PHYSICAL THERAPIST

## 2021-07-12 PROCEDURE — 97140 MANUAL THERAPY 1/> REGIONS: CPT | Performed by: PHYSICAL THERAPIST

## 2021-07-12 NOTE — PROGRESS NOTES
Daily Note     Today's date: 2021  Patient name: Dirk Barrett  : 1975  MRN: [id number]  Referring provider: CHAKA Ascencio  Dx:   Encounter Diagnosis     ICD-10-CM    1  Chronic right-sided low back pain with right-sided sciatica  M54 41     G89 29        Start Time: 0245  Stop Time: 0330  Total time in clinic (min): 45 minutes    Subjective: Pt states she felt really good after her evaluation appointment  Pt notes ext exercises made her feel the best she has in weeks  States HEP compliance, notes knee to chest exercise may have caused some increased pain in her back  Objective: See treatment diary below      Assessment: Tactile and verbal cueing required to engage glutes in bridging exercise, and avoid lumbar extensor compensations  Pt presents with hypomobility of the lumbar spine  Sx's in R buttock were relieved with PA mobs and repeated extension  Pt demonstrates good activation of TrA during stabilization exercises  Plan: Continue per plan of care  Progress tx as tolerated        Precautions: History of Chronic LBP      Manual       Prone press up w OP  L1-3  KF  3x10      L1-3 PA mobs gr 3  KF                      Neuro Re-Ed        BKFO 1x10 2x20      Glute sets 1x10 2x10x5"      Bridge  2x10x5"      SLR  2x10 ea                              There Ex        Single knee to chest 1x10 ea hold      Pt edu KF       Rec bike  10'      Prone press up  1x10      AROLDO  1x10              There Activity                Gait Training                Modalities

## 2021-07-14 ENCOUNTER — HOSPITAL ENCOUNTER (OUTPATIENT)
Dept: RADIOLOGY | Facility: CLINIC | Age: 46
Discharge: HOME | End: 2021-07-14
Attending: ANESTHESIOLOGY | Admitting: ANESTHESIOLOGY
Payer: COMMERCIAL

## 2021-07-14 VITALS
OXYGEN SATURATION: 98 % | HEART RATE: 98 BPM | TEMPERATURE: 98.1 F | SYSTOLIC BLOOD PRESSURE: 121 MMHG | RESPIRATION RATE: 20 BRPM | DIASTOLIC BLOOD PRESSURE: 83 MMHG

## 2021-07-14 DIAGNOSIS — M54.16 LUMBAR RADICULOPATHY: ICD-10-CM

## 2021-07-14 PROCEDURE — 64483 NJX AA&/STRD TFRM EPI L/S 1: CPT | Performed by: ANESTHESIOLOGY

## 2021-07-14 RX ORDER — PAPAVERINE HCL 150 MG
10 CAPSULE, EXTENDED RELEASE ORAL ONCE
Status: COMPLETED | OUTPATIENT
Start: 2021-07-14 | End: 2021-07-14

## 2021-07-14 RX ADMIN — DEXAMETHASONE SODIUM PHOSPHATE 10 MG: 10 INJECTION, SOLUTION INTRAMUSCULAR; INTRAVENOUS at 11:13

## 2021-07-14 RX ADMIN — IOHEXOL 2 ML: 300 INJECTION, SOLUTION INTRAVENOUS at 11:11

## 2021-07-14 RX ADMIN — LIDOCAINE HYDROCHLORIDE 1 ML: 20 INJECTION, SOLUTION EPIDURAL; INFILTRATION; INTRACAUDAL; PERINEURAL at 11:13

## 2021-07-14 NOTE — DISCHARGE INSTRUCTIONS
Epidural Steroid Injection   WHAT YOU NEED TO KNOW:   An epidural steroid injection (JEFFREY) is a procedure to inject steroid medicine into the epidural space  The epidural space is between your spinal cord and vertebrae  Steroids reduce inflammation and fluid buildup in your spine that may be causing pain  You may be given pain medicine along with the steroids  ACTIVITY  · Do not drive or operate machinery today  · No strenuous activity today - bending, lifting, etc   · You may resume normal activites starting tomorrow - start slowly and as tolerated  · You may shower today, but no tub baths or hot tubs  · You may have numbness for several hours from the local anesthetic  Please use caution and common sense, especially with weight-bearing activities  CARE OF THE INJECTION SITE  · If you have soreness or pain, apply ice to the area today (20 minutes on/20 minutes off)  · Starting tomorrow, you may use warm, moist heat or ice if needed  · You may have an increase or change in your discomfort for 36-48 hours after your treatment  · Apply ice and continue with any pain medication you have been prescribed  · [facility]ify [facility] if you have any of the following: redness, drainage, swelling, headache, stiff neck or fever above 100°F     SPECIAL INSTRUCTIONS  · Our office will contact you in approximately 7 days for a progress report  MEDICATIONS  · Continue to take all routine medications  · Our office may have instructed you to hold some medications  As no general anesthesia was used in today's procedure, you should not experience any side effects related to anesthesia  If you have a problem specifically related to your procedure, please call our office at [phone]  Problems not related to your procedure should be directed to your primary care physician

## 2021-07-14 NOTE — H&P
History of Present Illness: The patient is a 39 y o  female who presents with complaints of   Low back and leg pain  Patient Active Problem List   Diagnosis    Hair loss    Anxiety and depression    BMI 33 0-33 9,adult    Vitamin D deficiency    Seasonal allergies    Hypercholesterolemia    Acute left-sided low back pain with left-sided sciatica    Lumbar radiculopathy    Lumbar disc herniation    Lumbar spondylosis    Spinal stenosis of lumbar region    History of robot-assisted laparoscopic hysterectomy    Chronic pain syndrome       Past Medical History:   Diagnosis Date    Back disorder     Depression     PONV (postoperative nausea and vomiting)        Past Surgical History:   Procedure Laterality Date    BACK SURGERY      Lumbar( L3)     SECTION  ,7898    X2    CHOLECYSTECTOMY      ENDOMETRIAL BIOPSY      EXAMINATION UNDER ANESTHESIA N/A 5/15/2020    Procedure: EXAM UNDER ANESTHESIA (EUA);   Surgeon: Ivory Adams MD;  [facility];  Service: Gynecology    INDUCED       times 2    ND HYSTEROSCOPY,W/ENDO BX N/A 5/15/2020    Procedure: DILATATION AND CURETTAGE (D&C) WITH HYSTEROSCOPY;  Surgeon: Ivory Adams MD;  [facility];  Service: Gynecology    ND INSERT INTRAUTERINE DEVICE N/A 5/15/2020    Procedure: Mirena insertion;  Surgeon: Ivory Adams MD;  [facility];  Service: Gynecology    ND LAPAROSCOPY W TOT HYSTERECTUTERUS <=250 GRAM  W TUBE/OVARY N/A 2021    Procedure: ROBOTIC ASSISTED LAPAROSCOPIC HYSTERECTOMY BILATERAL SALPINGECTOMY EUA, REMOVAL OF IUD, LYSIS OF ADHESIONS, CYSTOSCOPY;  Surgeon: Ivory Adams MD;  [facility];  Service: Gynecology    TUBAL LIGATION      WISDOM TOOTH EXTRACTION  10/05/2020         Current Outpatient Medications:     acetaminophen (TYLENOL) 500 mg tablet, Take 2 tablets (1,000 mg total) by mouth every 8 (eight) hours (Patient not taking: Reported on 2021), Disp: 27 tablet, Rfl: 0    escitalopram (LEXAPRO) 10 mg tablet, Take 1 tablet (10 mg total) by mouth daily at bedtime, Disp: 90 tablet, Rfl: 1    ibuprofen (MOTRIN) 800 mg tablet, Take 1 tablet (800 mg total) by mouth every 8 (eight) hours, Disp: 30 tablet, Rfl: 0    multivitamin-minerals (CENTRUM ADULTS) tablet, Take 1 tablet by mouth daily, Disp: , Rfl:     pregabalin (LYRICA) 50 mg capsule, Take 1 capsule (50 mg total) by mouth 3 (three) times a day, Disp: 90 capsule, Rfl: 1    rosuvastatin (CRESTOR) 10 MG tablet, Take 1 tablet (10 mg total) by mouth daily (Patient taking differently: Take 10 mg by mouth daily at bedtime ), Disp: 90 tablet, Rfl: 3    Current Facility-Administered Medications:     dexamethasone (PF) (DECADRON) injection 10 mg, 10 mg, Epidural, Once, Abundio Trammell, DO    iohexol (OMNIPAQUE) 300 mg/mL injection 50 mL, 50 mL, Epidural, Once, Abundio Trammell, DO    lidocaine (PF) (XYLOCAINE-MPF) 2 % injection 2 mL, 2 mL, Epidural, Once, Abundio Trammell, DO    Allergies   Allergen Reactions    Amoxicillin Rash       Physical Exam:   Vitals:    07/14/21 1047   Pulse: 81   Resp: 20   Temp: 98 1 °F (36 7 °C)   SpO2: 98%     General: Awake, Alert, Oriented x 3, Mood and affect appropriate  Respiratory: Respirations even and unlabored  Cardiovascular: Peripheral pulses intact; no edema  Musculoskeletal Exam:   Right lumbar paraspinals tender to palpation  ASA Score: 2    Patient/Chart Verification  Patient ID Verified: Verbal  ID Band Applied: No  Consents Confirmed: Procedural, To be obtained in the Pre-Procedure area  H&P( within 30 days) Verified: To be obtained in the Pre-Procedure area  Allergies Reviewed: Yes  Anticoag/NSAID held?: NA  Currently on antibiotics?: No  Pregnancy denied?: Yes    Assessment:   1   Lumbar radiculopathy        Plan: Right L5 TFESI

## 2021-07-15 ENCOUNTER — OFFICE VISIT (OUTPATIENT)
Dept: OBGYN CLINIC | Facility: CLINIC | Age: 46
End: 2021-07-15

## 2021-07-15 VITALS — WEIGHT: 220.8 LBS | BODY MASS INDEX: 36.74 KG/M2 | DIASTOLIC BLOOD PRESSURE: 74 MMHG | SYSTOLIC BLOOD PRESSURE: 110 MMHG

## 2021-07-15 DIAGNOSIS — Z90.710 HISTORY OF ROBOT-ASSISTED LAPAROSCOPIC HYSTERECTOMY: ICD-10-CM

## 2021-07-15 DIAGNOSIS — R39.15 URINARY URGENCY: Primary | ICD-10-CM

## 2021-07-15 LAB
AMORPH URATE CRY URNS QL MICRO: ABNORMAL /HPF
BACTERIA UR QL AUTO: ABNORMAL /HPF
BILIRUB UR QL STRIP: NEGATIVE
CAOX CRY URNS QL MICRO: ABNORMAL /HPF
CLARITY UR: ABNORMAL
COLOR UR: YELLOW
GLUCOSE UR STRIP-MCNC: NEGATIVE MG/DL
HGB UR QL STRIP.AUTO: NEGATIVE
KETONES UR STRIP-MCNC: NEGATIVE MG/DL
LEUKOCYTE ESTERASE UR QL STRIP: NEGATIVE
NITRITE UR QL STRIP: NEGATIVE
NON-SQ EPI CELLS URNS QL MICRO: ABNORMAL /HPF
PH UR STRIP.AUTO: 6 [PH]
PROT UR STRIP-MCNC: ABNORMAL MG/DL
RBC #/AREA URNS AUTO: ABNORMAL /HPF
SP GR UR STRIP.AUTO: 1.04 (ref 1–1.03)
UROBILINOGEN UR QL STRIP.AUTO: 0.2 E.U./DL
WBC #/AREA URNS AUTO: ABNORMAL /HPF

## 2021-07-15 PROCEDURE — 87086 URINE CULTURE/COLONY COUNT: CPT | Performed by: STUDENT IN AN ORGANIZED HEALTH CARE EDUCATION/TRAINING PROGRAM

## 2021-07-15 PROCEDURE — 81001 URINALYSIS AUTO W/SCOPE: CPT | Performed by: STUDENT IN AN ORGANIZED HEALTH CARE EDUCATION/TRAINING PROGRAM

## 2021-07-15 PROCEDURE — 99024 POSTOP FOLLOW-UP VISIT: CPT | Performed by: STUDENT IN AN ORGANIZED HEALTH CARE EDUCATION/TRAINING PROGRAM

## 2021-07-15 NOTE — ASSESSMENT & PLAN NOTE
Pain mild and improving  Meeting other milestones  Cuff well healed  Cleared for activity and reviewed precautions  Will return in 3-6 months for annual exam

## 2021-07-15 NOTE — PROGRESS NOTES
Assessment/Plan:   History of robot-assisted laparoscopic hysterectomy  Pain mild and improving  Meeting other milestones  Cuff well healed  Cleared for activity and reviewed precautions  Will return in 3-6 months for annual exam    Urinary urgency  Increased frequency and urgency  UA and culture  Discussed timed voids       Diagnoses and all orders for this visit:    Urinary urgency  -     Urine culture  -     Urinalysis with microscopic    History of robot-assisted laparoscopic hysterectomy          Subjective:     Patient ID: Shun Bernstein is a 39 y o  female  38 yo here for 6 wk post op from robotic hysterectomy, bs  She is doing well  Still has some mild pain just under her belly button, not constant or severe very manageable  No issues with BM  Having increased urinary frequency and urgency but no pain with urination  No bleeding  Review of Systems   Constitutional: Negative for chills and fever  HENT: Negative for ear pain and sore throat  Eyes: Negative for pain and visual disturbance  Respiratory: Negative for cough and shortness of breath  Cardiovascular: Negative for chest pain and palpitations  Gastrointestinal: Negative for abdominal pain, constipation, diarrhea, nausea and vomiting  Genitourinary: Negative for dyspareunia, dysuria, frequency, hematuria, urgency, vaginal bleeding, vaginal discharge and vaginal pain  Musculoskeletal: Negative for arthralgias and back pain  Skin: Negative for color change and rash  Neurological: Negative for seizures and syncope  All other systems reviewed and are negative  Objective:     Physical Exam  Vitals reviewed  Constitutional:       General: She is not in acute distress  Appearance: She is well-developed  She is not diaphoretic  HENT:      Head: Normocephalic and atraumatic  Pulmonary:      Effort: Pulmonary effort is normal  No respiratory distress  Abdominal:      General: Abdomen is flat        Palpations: Abdomen is soft  There is no mass  Tenderness: There is no abdominal tenderness  There is no guarding or rebound  Hernia: No hernia is present  Genitourinary:     Labia:         Right: No rash, tenderness, lesion or injury  Left: No rash, tenderness, lesion or injury  Vagina: Normal  No vaginal discharge, erythema, tenderness or bleeding  Uterus: Absent  Comments: Cervix and uterus surgically absent  Nontender  No masses  Cuff visually and digitally intact  Musculoskeletal:      Cervical back: Normal range of motion  Neurological:      Mental Status: She is alert and oriented to person, place, and time  Psychiatric:         Behavior: Behavior normal          Thought Content:  Thought content normal          Judgment: Judgment normal

## 2021-07-15 NOTE — LETTER
July 15, 2021     Patient: Ana Goodman   YOB: 1975   Date of Visit: 7/15/2021       To Whom it May Concern:    Russ Coughlin is under my professional care  She was seen in my office on 7/15/2021  She may return to work on 07/19/2021  If you have any questions or concerns, please don't hesitate to call         Sincerely,        Alexandre Espinosa MD

## 2021-07-15 NOTE — PROGRESS NOTES
Pt presents for post op   C/o frequent urination and urgency  And pain below belly button   Denies any bleeding

## 2021-07-16 ENCOUNTER — OFFICE VISIT (OUTPATIENT)
Dept: PHYSICAL THERAPY | Age: 46
End: 2021-07-16
Payer: COMMERCIAL

## 2021-07-16 DIAGNOSIS — M54.41 CHRONIC RIGHT-SIDED LOW BACK PAIN WITH RIGHT-SIDED SCIATICA: Primary | ICD-10-CM

## 2021-07-16 DIAGNOSIS — G89.29 CHRONIC RIGHT-SIDED LOW BACK PAIN WITH RIGHT-SIDED SCIATICA: Primary | ICD-10-CM

## 2021-07-16 PROCEDURE — 97112 NEUROMUSCULAR REEDUCATION: CPT

## 2021-07-16 PROCEDURE — 97140 MANUAL THERAPY 1/> REGIONS: CPT

## 2021-07-16 PROCEDURE — 97110 THERAPEUTIC EXERCISES: CPT

## 2021-07-16 NOTE — PROGRESS NOTES
Daily Note     Today's date: 2021  Patient name: Paris Reza  : 1975  MRN: [id number]  Referring provider: CHAKA Ferguson  Dx:   Encounter Diagnosis     ICD-10-CM    1  Chronic right-sided low back pain with right-sided sciatica  M54 41     G89 29                   Subjective: pt reports she had good relief with LAD at eval, pt reports she trying to be compliant with HEP      Objective: See treatment diary below      Assessment: pt did have centralization of sx with AROLDO, encouraged pt to be compliant with AROLDO (6sets of 10 reps throughout the day) an core stabilization ex and to hold ex if any increased radicular sx   Pt ed for goals of ext based ex and core stabilization ex    Plan: Continue per plan of care  Progress treatment as tolerated  Precautions: History of Chronic LBP      Manual 21     Prone press up w OP  L1-3  KF  3x10 KF     L1-3 PA mobs gr 3  KF KF     LAD R   VK             Neuro Re-Ed        BKFO 1x10 2x20 2x20     Glute sets 1x10 2x10x5" 20x5"     Bridge  2x10x5" 2x10x5"     SLR  2x10 ea np     TrA activation   3x10x5"                     There Ex        Single knee to chest 1x10 ea hold      Pt edu KF  VK     Rec bike  10' 10'     Prone press up  1x10 above     AROLDO  1x10 2x10             There Activity                Gait Training                Modalities                            Patient manual intervention performed by student physical therapist, Vanesa Miller, under Physical Therapist supervision

## 2021-07-17 LAB — BACTERIA UR CULT: NORMAL

## 2021-07-21 ENCOUNTER — TELEPHONE (OUTPATIENT)
Dept: PAIN MEDICINE | Facility: CLINIC | Age: 46
End: 2021-07-21

## 2021-07-23 ENCOUNTER — OFFICE VISIT (OUTPATIENT)
Dept: PHYSICAL THERAPY | Age: 46
End: 2021-07-23
Payer: COMMERCIAL

## 2021-07-23 DIAGNOSIS — M54.41 CHRONIC RIGHT-SIDED LOW BACK PAIN WITH RIGHT-SIDED SCIATICA: Primary | ICD-10-CM

## 2021-07-23 DIAGNOSIS — G89.29 CHRONIC RIGHT-SIDED LOW BACK PAIN WITH RIGHT-SIDED SCIATICA: Primary | ICD-10-CM

## 2021-07-23 PROCEDURE — 97140 MANUAL THERAPY 1/> REGIONS: CPT | Performed by: PHYSICAL THERAPIST

## 2021-07-23 PROCEDURE — 97110 THERAPEUTIC EXERCISES: CPT | Performed by: PHYSICAL THERAPIST

## 2021-07-23 PROCEDURE — 97112 NEUROMUSCULAR REEDUCATION: CPT | Performed by: PHYSICAL THERAPIST

## 2021-07-23 NOTE — PROGRESS NOTES
Daily Note     Today's date: 2021  Patient name: Carmen Sidhu  : 1975  MRN: [id number]  Referring provider: CHAKA Urban  Dx:   Encounter Diagnosis     ICD-10-CM    1  Chronic right-sided low back pain with right-sided sciatica  M54 41     G89 29                   Subjective: "I returned to work this week and have some soreness today"       Objective: See treatment diary below      Assessment: Tolerated treatment well  Patient would benefit from continued PT, reports no complaints throughout session today, patient can be progressed in future tx's with posterior stabilization  Plan: Continue per plan of care  Precautions: History of Chronic LBP      Manual 21    Prone press up w OP  L1-3  KF  3x10 KF CW     L1-3 PA mobs gr 3  KF KF CW     LAD R   VK             Neuro Re-Ed        BKFO 1x10 2x20 2x20     Glute sets 1x10 2x10x5" 20x5" 20x5"     Bridge  2x10x5" 2x10x5" 20x5"     SLR  2x10 ea np     TrA activation   3x10x5" 2x10x5"    Multifidi press    20x ea RTB    rows    Unable p!     There Ex        Single knee to chest 1x10 ea hold      Pt edu KF  VK     Rec bike  10' 10' 10'     Prone press up  1x10 above     AROLDO  1x10 2x10     SIMONE press up    20x10"    There Activity                Gait Training                Modalities

## 2021-07-30 ENCOUNTER — OFFICE VISIT (OUTPATIENT)
Dept: PHYSICAL THERAPY | Age: 46
End: 2021-07-30
Payer: COMMERCIAL

## 2021-07-30 DIAGNOSIS — G89.29 CHRONIC RIGHT-SIDED LOW BACK PAIN WITH RIGHT-SIDED SCIATICA: Primary | ICD-10-CM

## 2021-07-30 DIAGNOSIS — M54.41 CHRONIC RIGHT-SIDED LOW BACK PAIN WITH RIGHT-SIDED SCIATICA: Primary | ICD-10-CM

## 2021-07-30 PROCEDURE — 97110 THERAPEUTIC EXERCISES: CPT | Performed by: PHYSICAL THERAPIST

## 2021-07-30 PROCEDURE — 97140 MANUAL THERAPY 1/> REGIONS: CPT | Performed by: PHYSICAL THERAPIST

## 2021-07-30 PROCEDURE — 97112 NEUROMUSCULAR REEDUCATION: CPT | Performed by: PHYSICAL THERAPIST

## 2021-07-30 NOTE — PROGRESS NOTES
Daily Note     Today's date: 2021  Patient name: Aida Jackson  : 1975  MRN: [id number]  Referring provider: CHAKA Jordan  Dx:   Encounter Diagnosis     ICD-10-CM    1  Chronic right-sided low back pain with right-sided sciatica  M54 41     G89 29                   Subjective: Reports feeling stiff today, and has been painful this week after work  Objective: See treatment diary below      Assessment: Tolerated treatment well  Patient would benefit from continued PT, was able to complete all exercises with no complaints throughout tx  Patient will be on vacation next week and will contact us when she's back  Plan: Continue per plan of care  Precautions: History of Chronic LBP      Manual 21    Prone press up w OP  L1-3  KF  3x10 KF CW  CW   L1-3 PA mobs gr 3  KF KF CW  CW    LAD R   VK             Neuro Re-Ed        BKFO 1x10 2x20 2x20     Glute sets 1x10 2x10x5" 20x5" 20x5"  20x5"    Bridge  2x10x5" 2x10x5" 20x5"  20x5"    SLR  2x10 ea np     TrA activation   3x10x5" 2x10x5"    Multifidi press    20x ea RTB 20x ea RTB   rows    Unable p!     There Ex        Single knee to chest 1x10 ea hold      Pt edu KF  VK     Rec bike  10' 10' 10'  10'    Prone press up  1x10 above     AROLDO  1x10 2x10     SIMONE press up    20x10" 20x5"    There Activity                Gait Training                Modalities

## 2021-08-20 ENCOUNTER — OFFICE VISIT (OUTPATIENT)
Dept: PAIN MEDICINE | Facility: CLINIC | Age: 46
End: 2021-08-20
Payer: COMMERCIAL

## 2021-08-20 VITALS
WEIGHT: 220 LBS | SYSTOLIC BLOOD PRESSURE: 108 MMHG | HEART RATE: 96 BPM | HEIGHT: 65 IN | DIASTOLIC BLOOD PRESSURE: 76 MMHG | BODY MASS INDEX: 36.65 KG/M2

## 2021-08-20 DIAGNOSIS — M54.16 LUMBAR RADICULOPATHY: ICD-10-CM

## 2021-08-20 DIAGNOSIS — G57.01 PIRIFORMIS SYNDROME OF RIGHT SIDE: Primary | ICD-10-CM

## 2021-08-20 PROCEDURE — 3008F BODY MASS INDEX DOCD: CPT | Performed by: NURSE PRACTITIONER

## 2021-08-20 PROCEDURE — 99214 OFFICE O/P EST MOD 30 MIN: CPT | Performed by: NURSE PRACTITIONER

## 2021-08-20 PROCEDURE — 1036F TOBACCO NON-USER: CPT | Performed by: NURSE PRACTITIONER

## 2021-08-20 RX ORDER — PREGABALIN 75 MG/1
75 CAPSULE ORAL 3 TIMES DAILY
Qty: 90 CAPSULE | Refills: 1 | Status: SHIPPED | OUTPATIENT
Start: 2021-08-20 | End: 2021-10-15 | Stop reason: SDUPTHER

## 2021-08-20 NOTE — PATIENT INSTRUCTIONS
Piriformis Syndrome   WHAT YOU NEED TO KNOW:   What is piriformis syndrome? Piriformis syndrome is sciatic nerve pain caused by an injured or overused piriformis muscle  This is a muscle inside your buttocks that helps you move your leg  The pain is caused when this muscle pinches your sciatic nerve  You may feel the pain in your hip or down your leg  What are the signs and symptoms of piriformis syndrome? · Hip or buttock pain after sitting, squatting, standing, or climbing stairs    · Sudden or gradual pain that starts in the buttock and spreads to your lower leg    · Trouble walking    · Pain when you cross your legs     · Pain when you pass a bowel movement    How is piriformis syndrome diagnosed? Your healthcare provider will examine you, and move your leg in different directions to check for pain  You may also need the following:  · Imaging tests  such as an ultrasound, a CT scan, or an MRI may be used to help healthcare providers see your muscles and nerves in more detail  You may be given contrast dye before these tests  Tell the healthcare provider if you have ever had an allergic reaction to contrast dye  Do not enter the MRI room with anything metal  Metal can cause serious injury  Tell the healthcare provider if you have any metal in or on your body  · An electromyography , or EMG, may be used to test the function of your muscles and the nerves that control them  How is piriformis syndrome treated? · Prescription medicines  may be used to relax your muscles and decrease pain and swelling  · NSAIDs  help decrease swelling and pain or fever  This medicine is available with or without a doctor's order  NSAIDs can cause stomach bleeding or kidney problems in certain people  If you take blood thinner medicine, always ask your healthcare provider if NSAIDs are safe for you  Always read the medicine label and follow directions  · Acetaminophen  decreases pain   It is available without a doctor's order  Ask how much to take and how often to take it  Follow directions  Acetaminophen can cause liver damage if not taken correctly  · Surgery  may be needed if other treatments do not relieve your symptoms  How can I manage my symptoms? · Rest as directed  Avoid activities that make your pain worse  · Apply ice to the buttock on your injured side  Use an ice pack, or put crushed ice in a plastic bag  Cover it with a towel  Leave the ice on for 15 to 20 minutes every hour, or as directed  Ice helps prevent tissue damage and decreases swelling and pain  · Apply heat to the buttock on your injured side  Use heating pads for 20 to 30 minutes every 2 hours for as many days as directed  Heat helps decrease pain and muscle spasms  · Stretch as directed  Lie on your back with your knees bent  Place the ankle of your injured leg on the knee of your other leg  Gently pull your bent leg toward your chest, until you feel a stretch in the buttock of your injured leg  A physical therapist may show you other exercises to stretch and strengthen your hip muscles  When should I contact my healthcare provider? · Your pain worsens or returns, even with treatment  · You have questions or concerns about your condition or care  When should I seek immediate care or call 911? · You cannot move your leg or foot  CARE AGREEMENT:   You have the right to help plan your care  Learn about your health condition and how it may be treated  Discuss treatment options with your healthcare providers to decide what care you want to receive  You always have the right to refuse treatment  The above information is an  only  It is not intended as medical advice for individual conditions or treatments  Talk to your doctor, nurse or pharmacist before following any medical regimen to see if it is safe and effective for you    © Copyright [employer] 2021 Information is for End User's use only and may not be sold, redistributed or otherwise used for commercial purposes   All illustrations and images included in CareNotes® are the copyrighted property of A [employer] Inc  or [facility] Earle Friedman

## 2021-08-20 NOTE — PROGRESS NOTES
Assessment:  1  Piriformis syndrome of right side    2  Lumbar radiculopathy        Plan:  1  Will schedule the patient for a right piriformis injection to address the inflammatory component the patient's pain  Complete risks and benefits including bleeding, infection, tissue reaction, nerve injury and allergic reaction were discussed  The patient was agreeable and verbalized an understanding  2  I will gently increase pregabalin to 75 mg 3 times a day for neuropathic complaints  I advised the patient that if they experience any side effects or issues with the changes in their medication regiment, they should give our office a call to discuss  I also advised the patient not to drive or operate machinery until they see how the changes in the medication regimen affects them  The patient was agreeable and verbalized an understanding  3  Patient may continue ibuprofen p r n  and should not exceed more than 2400 mg in 24 hours   4  The patient will continue with her home exercise program and physical therapy  5  Patient will follow-up after her procedure or sooner if needed     [email or web address] software was used to dictate this note  It may contain errors with dictating incorrect words or incorrect spelling  Please contact the provider directly with any questions  History of Present Illness: The patient is a 39 y o  female  With a history of previous lumbar surgeries last seen on 6/30/21 who presents for a follow up office visit in regards to chronic right buttock pain I will radiate into the posterior thigh   The patient denies left lower extremity symptoms currently, bowel or bladder incontinence or saddle anesthesia  Left L3 and L4 TFESI resolved her left lower extremity symptoms  The patient is now status post bilateral L5 TFESI and right L5 TFESI without any significant improvement of her symptoms  She continues with PT without much improvement    She states that she does not have much pain until she has been ambulating for a period of time for which the pain then becomes rather significant  She rates her pain a 1 to 6/10 on the numeric pain rating scale she states her pain is intermittent in nature and bothersome the morning in the evening  She characterizes the pain as sharp, cramping and pins and needles    Current pain medications includes: pregabalin 50 mg 3 times a day and ibuprofen 800 mg q 8 hours p r n     The patient reports that this regimen is providing 25% pain relief  The patient is reporting no side effects from this pain medication regimen  I have personally reviewed and/or updated the patient's past medical history, past surgical history, family history, social history, current medications, allergies, and vital signs today  Review of Systems:    Review of Systems   Respiratory: Negative for shortness of breath  Cardiovascular: Negative for chest pain  Gastrointestinal: Negative for constipation, diarrhea, nausea and vomiting  Musculoskeletal: Negative for arthralgias, gait problem, joint swelling and myalgias  Skin: Negative for rash  Neurological: Negative for dizziness, seizures and weakness  All other systems reviewed and are negative  Past Medical History:   Diagnosis Date    Back disorder     Depression     PONV (postoperative nausea and vomiting)        Past Surgical History:   Procedure Laterality Date    BACK SURGERY      Lumbar( L3)     SECTION  0693,7755    X2    CHOLECYSTECTOMY      ENDOMETRIAL BIOPSY      EXAMINATION UNDER ANESTHESIA N/A 5/15/2020    Procedure: EXAM UNDER ANESTHESIA (EUA);   Surgeon: Delilah Rodriguez MD;  [facility];  Service: Gynecology    INDUCED       times 2    AK HYSTEROSCOPY,W/ENDO BX N/A 5/15/2020    Procedure: DILATATION AND CURETTAGE (D&C) WITH HYSTEROSCOPY;  Surgeon: Delilah Rodriguez MD;  [facility];  Service: Gynecology    AK INSERT INTRAUTERINE DEVICE N/A 5/15/2020    Procedure: Mirena insertion;  Surgeon: Kanwal Carmen MD;  [facility];  Service: Gynecology    TX LAPAROSCOPY W TOT HYSTERECTUTERUS <=250 GRAM  W TUBE/OVARY N/A 5/19/2021    Procedure: ROBOTIC ASSISTED LAPAROSCOPIC HYSTERECTOMY BILATERAL SALPINGECTOMY EUA, REMOVAL OF IUD, LYSIS OF ADHESIONS, CYSTOSCOPY;  Surgeon: Kanwal Carmen MD;  [facility];  Service: Gynecology    TUBAL LIGATION      WISDOM TOOTH EXTRACTION  10/05/2020       Family History   Problem Relation Age of Onset    Diabetes Mother     Hypertension Mother     Anxiety disorder Family         symptom    Other Family         breast disorders    Heart disease Other         cardiac disorder    Heart disease Other         cardiac disorder    Depression Father     Depression Sister        Social History     Occupational History    Occupation: currently works full-time   Tobacco Use    Smoking status: Never Smoker    Smokeless tobacco: Never Used   Vaping Use    Vaping Use: Never used   Substance and Sexual Activity    Alcohol use: Not Currently    Drug use: No    Sexual activity: Yes     Birth control/protection: Surgical, Female Sterilization     Comment: Tubal ligations         Current Outpatient Medications:     rosuvastatin (CRESTOR) 10 MG tablet, Take 1 tablet (10 mg total) by mouth daily (Patient taking differently: Take 10 mg by mouth daily at bedtime ), Disp: 90 tablet, Rfl: 3    acetaminophen (TYLENOL) 500 mg tablet, Take 2 tablets (1,000 mg total) by mouth every 8 (eight) hours (Patient not taking: Reported on 6/4/2021), Disp: 30 tablet, Rfl: 0    escitalopram (LEXAPRO) 10 mg tablet, Take 1 tablet (10 mg total) by mouth daily at bedtime, Disp: 90 tablet, Rfl: 1    ibuprofen (MOTRIN) 800 mg tablet, Take 1 tablet (800 mg total) by mouth every 8 (eight) hours, Disp: 30 tablet, Rfl: 0    multivitamin-minerals (CENTRUM ADULTS) tablet, Take 1 tablet by mouth daily, Disp: , Rfl:     pregabalin (LYRICA) 75 mg capsule, Take 1 capsule (75 mg total) by mouth 3 (three) times a day, Disp: 90 capsule, Rfl: 1    Allergies   Allergen Reactions    Amoxicillin Rash       Physical Exam:    /76   Pulse 96   Ht 5' 5" (1 651 m)   Wt 99 8 kg (220 lb)   LMP 05/15/2020   BMI 36 61 kg/m²     Constitutional:normal, well developed, well nourished, alert, in no distress and non-toxic and no overt pain behavior  Eyes:anicteric  HEENT:grossly intact  Neck:supple, symmetric, trachea midline and no masses   Pulmonary:even and unlabored  Cardiovascular:No edema or pitting edema present  Skin:Normal without rashes or lesions and well hydrated  Psychiatric:Mood and affect appropriate  Neurologic:Cranial Nerves II-XII grossly intact  Musculoskeletal:normal gait  Bilateral lumbar paraspinal musculature nontender to palpation  Bilateral SI joints nontender to palpation  Bilateral lower extremity strength 5/5 in all muscle groups  Right piriformis mildly tender to palpation  Negative straight leg raise bilaterally  Negative Robb's, Gaenslen's and AP compression test bilaterally  Imaging  FL spine and pain procedure    (Results Pending)     MRI LUMBAR SPINE WITHOUT CONTRAST   INDICATION: M54 42: Lumbago with sciatica, left side   M54 10: Radiculopathy, site unspecified  Acute left-sided low back pain with left-sided sciatica  COMPARISON: None  TECHNIQUE: Sagittal T1, sagittal T2, sagittal inversion recovery, axial T1 and axial T2, coronal T2    IMAGE QUALITY: Diagnostic   FINDINGS:   VERTEBRAL BODIES: There is a transitional lumbosacral junction  S1 is a transitional level and is lumbarized  There is a hypoplastic but complete disc space at S1-2  Slight retrolisthesis L3 on 4  Endplate degenerative marrow signal L5-S1  SACRUM: Normal signal within the sacrum  No evidence of insufficiency or stress fracture  DISTAL CORD AND CONUS: Normal size and signal within the distal cord and conus     PARASPINAL SOFT TISSUES: Paraspinal soft tissues are unremarkable  LOWER THORACIC DISC SPACES: Normal disc height and signal  No disc herniation, canal stenosis or foraminal narrowing  LUMBAR DISC SPACES:   L1-L2: No significant central or foraminal narrowing  L2-L3: Moderate facet hypertrophy  No significant central or foraminal narrowing  L3-L4: Inferiorly extruded left paramedian disc herniation descends 1 cm below the native disc margin  Severe left lateral recess stenosis is noted  Correlate for left L4 radiculopathy  Moderate central stenosis  Foramina are patent  L4-L5: Small marginal osteophytes bilaterally and facet hypertrophy combined result in minimal foraminal narrowing  Central canal patent  L5-S1: Severe facet hypertrophy  There is degenerative disc osteophyte complex with marginal osteophytes resulting in moderate bilateral foraminal narrowing and mild lateral recess stenosis  S1-S2: This is a transitional level  No significant central stenosis  Mild left foraminal narrowing  Moderate facet hypertrophy  IMPRESSION:   Inferiorly extruded left paramedian disc herniation L3-4 results in severe lateral recess stenosis  Correlate for left L4 radiculopathy  Moderate central stenosis also noted  Transitional lumbosacral junction noted with a lumbarized S1 level  Degenerative changes are seen elsewhere as detailed above         Orders Placed This Encounter   Procedures    FL spine and pain procedure

## 2021-08-30 ENCOUNTER — RA CDI HCC (OUTPATIENT)
Dept: OTHER | Facility: HOSPITAL | Age: 46
End: 2021-08-30

## 2021-08-30 NOTE — PROGRESS NOTES
[facility]  coding opportunities             Chart Reviewed * (Number of) Inbasket suggestions sent to Provider: 2                  Patients insurance company: [employer] (Medicare Advantage and Commercial)     Visit status: Patient canceled the appointment        [facility] 75  coding opportunities             Chart Reviewed * (Number of) Inbasket suggestions sent to Provider: 2   DX: not on problem list  E66 01-Morbid (severe) obesity due to excess [id number] with hypercholesterolemia    It is noted in the patient record that the patient has F32 9 depression, single episode, unspecified, on lexapro  Noted phq scores in march of this year at 0  Unsure on current status      Can the depression be further specified as:     F32 0 major depressive disorder, single episode, mild  OR   F32 1 major depressive disorder, single episode, moderate  OR   F32 2 major depressive disorder, single episode, severe without psychotic features OR   F32 4 major depressive disorder, single episode, in partial remission OR   F32 5 major depressive disorder, single episode, in full remission                 Patients insurance company: [employer] (Medicare Advantage and Commercial)

## 2021-09-07 ENCOUNTER — TELEPHONE (OUTPATIENT)
Dept: INTERNAL MEDICINE CLINIC | Facility: CLINIC | Age: 46
End: 2021-09-07

## 2021-09-07 DIAGNOSIS — F32.A ANXIETY AND DEPRESSION: ICD-10-CM

## 2021-09-07 DIAGNOSIS — F41.9 ANXIETY AND DEPRESSION: ICD-10-CM

## 2021-09-07 DIAGNOSIS — L65.9 HAIR LOSS: ICD-10-CM

## 2021-09-07 DIAGNOSIS — E78.00 HYPERCHOLESTEROLEMIA: ICD-10-CM

## 2021-09-07 RX ORDER — ESCITALOPRAM OXALATE 10 MG/1
10 TABLET ORAL
Qty: 90 TABLET | Refills: 1 | Status: SHIPPED | OUTPATIENT
Start: 2021-09-07 | End: 2022-03-03 | Stop reason: SDUPTHER

## 2021-09-07 RX ORDER — ROSUVASTATIN CALCIUM 10 MG/1
10 TABLET, COATED ORAL DAILY
Qty: 90 TABLET | Refills: 1 | Status: SHIPPED | OUTPATIENT
Start: 2021-09-07 | End: 2022-03-03 | Stop reason: SDUPTHER

## 2021-09-07 NOTE — TELEPHONE ENCOUNTER
Dr Elke Boyce,     Pt would like to get bw orders for upcoming appt  Please place orders  Thank you!

## 2021-09-07 NOTE — TELEPHONE ENCOUNTER
Patient needs a refill of lexapro and crestor sent to [facility] [place]  She had to change her appointment and the next open in [employer] was in November  She is wondering if she can get refills until that appointment?

## 2021-09-15 ENCOUNTER — HOSPITAL ENCOUNTER (OUTPATIENT)
Dept: RADIOLOGY | Facility: CLINIC | Age: 46
Discharge: HOME | End: 2021-09-15
Attending: ANESTHESIOLOGY | Admitting: ANESTHESIOLOGY
Payer: COMMERCIAL

## 2021-09-15 VITALS
RESPIRATION RATE: 20 BRPM | DIASTOLIC BLOOD PRESSURE: 80 MMHG | OXYGEN SATURATION: 96 % | SYSTOLIC BLOOD PRESSURE: 122 MMHG | HEART RATE: 90 BPM | TEMPERATURE: 97.8 F

## 2021-09-15 DIAGNOSIS — G57.01 PIRIFORMIS SYNDROME OF RIGHT SIDE: ICD-10-CM

## 2021-09-15 PROCEDURE — 77002 NEEDLE LOCALIZATION BY XRAY: CPT | Performed by: ANESTHESIOLOGY

## 2021-09-15 PROCEDURE — 20552 NJX 1/MLT TRIGGER POINT 1/2: CPT | Performed by: ANESTHESIOLOGY

## 2021-09-15 RX ORDER — LIDOCAINE HYDROCHLORIDE 10 MG/ML
5 INJECTION, SOLUTION EPIDURAL; INFILTRATION; INTRACAUDAL; PERINEURAL ONCE
Status: COMPLETED | OUTPATIENT
Start: 2021-09-15 | End: 2021-09-15

## 2021-09-15 RX ORDER — METHYLPREDNISOLONE ACETATE 40 MG/ML
40 INJECTION, SUSPENSION INTRA-ARTICULAR; INTRALESIONAL; INTRAMUSCULAR; PARENTERAL; SOFT TISSUE ONCE
Status: COMPLETED | OUTPATIENT
Start: 2021-09-15 | End: 2021-09-15

## 2021-09-15 RX ORDER — BUPIVACAINE HCL/PF 2.5 MG/ML
30 VIAL (ML) INJECTION ONCE
Status: COMPLETED | OUTPATIENT
Start: 2021-09-15 | End: 2021-09-15

## 2021-09-15 RX ADMIN — IOHEXOL 2 ML: 300 INJECTION, SOLUTION INTRAVENOUS at 15:06

## 2021-09-15 RX ADMIN — METHYLPREDNISOLONE ACETATE 40 MG: 40 INJECTION, SUSPENSION INTRA-ARTICULAR; INTRALESIONAL; INTRAMUSCULAR; SOFT TISSUE at 15:06

## 2021-09-15 RX ADMIN — LIDOCAINE HYDROCHLORIDE 2 ML: 10 INJECTION, SOLUTION EPIDURAL; INFILTRATION; INTRACAUDAL; PERINEURAL at 15:05

## 2021-09-15 RX ADMIN — BUPIVACAINE HYDROCHLORIDE 2 ML: 2.5 INJECTION, SOLUTION EPIDURAL; INFILTRATION; INTRACAUDAL at 15:06

## 2021-09-15 NOTE — H&P
History of Present Illness: The patient is a 39 y o  female who presents with complaints of  Right buttock and leg pain  Patient Active Problem List   Diagnosis    Hair loss    Anxiety and depression    BMI 33 0-33 9,adult    Vitamin D deficiency    Seasonal allergies    Hypercholesterolemia    Acute left-sided low back pain with left-sided sciatica    Lumbar radiculopathy    Lumbar disc herniation    Lumbar spondylosis    Spinal stenosis of lumbar region    History of robot-assisted laparoscopic hysterectomy    Chronic pain syndrome    Urinary urgency       Past Medical History:   Diagnosis Date    Back disorder     Depression     PONV (postoperative nausea and vomiting)        Past Surgical History:   Procedure Laterality Date    BACK SURGERY      Lumbar( L3)     SECTION  2922,0663    X2    CHOLECYSTECTOMY      ENDOMETRIAL BIOPSY      EXAMINATION UNDER ANESTHESIA N/A 5/15/2020    Procedure: EXAM UNDER ANESTHESIA (EUA);   Surgeon: Kanwal Carmen MD;  [facility];  Service: Gynecology    INDUCED       times 2    WV HYSTEROSCOPY,W/ENDO BX N/A 5/15/2020    Procedure: DILATATION AND CURETTAGE (D&C) WITH HYSTEROSCOPY;  Surgeon: Kanwal Carmen MD;  [facility];  Service: Gynecology    WV INSERT INTRAUTERINE DEVICE N/A 5/15/2020    Procedure: Mirena insertion;  Surgeon: Kanwal Carmen MD;  [facility];  Service: Gynecology    WV LAPAROSCOPY W TOT HYSTERECTUTERUS <=250 GRAM  W TUBE/OVARY N/A 2021    Procedure: ROBOTIC ASSISTED LAPAROSCOPIC HYSTERECTOMY BILATERAL SALPINGECTOMY EUA, REMOVAL OF IUD, LYSIS OF ADHESIONS, CYSTOSCOPY;  Surgeon: Kanwal Carmen MD;  [facility];  Service: Gynecology    TUBAL LIGATION      WISDOM TOOTH EXTRACTION  10/05/2020         Current Outpatient Medications:     acetaminophen (TYLENOL) 500 mg tablet, Take 2 tablets (1,000 mg total) by mouth every 8 (eight) hours (Patient not taking: Reported on 6/4/2021), Disp: 30 tablet, Rfl: 0    escitalopram (LEXAPRO) 10 mg tablet, Take 1 tablet (10 mg total) by mouth daily at bedtime, Disp: 90 tablet, Rfl: 1    ibuprofen (MOTRIN) 800 mg tablet, Take 1 tablet (800 mg total) by mouth every 8 (eight) hours, Disp: 30 tablet, Rfl: 0    multivitamin-minerals (CENTRUM ADULTS) tablet, Take 1 tablet by mouth daily, Disp: , Rfl:     pregabalin (LYRICA) 75 mg capsule, Take 1 capsule (75 mg total) by mouth 3 (three) times a day, Disp: 90 capsule, Rfl: 1    rosuvastatin (CRESTOR) 10 MG tablet, Take 1 tablet (10 mg total) by mouth daily, Disp: 90 tablet, Rfl: 1    Current Facility-Administered Medications:     bupivacaine (PF) (MARCAINE) 0 25 % injection 30 mL, 30 mL, Intra-articular, Once, Abundio Trammell, DO    iohexol (OMNIPAQUE) 300 mg/mL injection 50 mL, 50 mL, Intra-articular, Once, Abundio Trammell, DO    lidocaine (PF) (XYLOCAINE-MPF) 1 % injection 5 mL, 5 mL, Other, Once, Abundio Trammell, DO    methylPREDNISolone acetate (DEPO-MEDROL) injection 40 mg, 40 mg, Intra-articular, Once, Abundio Trammell, DO    Allergies   Allergen Reactions    Amoxicillin Rash       Physical Exam:   Vitals:    09/15/21 1455   BP: 121/77   Pulse: 92   Resp: 20   Temp: 97 8 °F (36 6 °C)   SpO2: 94%     General: Awake, Alert, Oriented x 3, Mood and affect appropriate  Respiratory: Respirations even and unlabored  Cardiovascular: Peripheral pulses intact; no edema  Musculoskeletal Exam:  Tenderness to palpation over right piriformis fossa    ASA Score: 2    Patient/Chart Verification  Patient ID Verified: Verbal  ID Band Applied: No  Consents Confirmed: Procedural, To be obtained in the Pre-Procedure area  H&P( within 30 days) Verified: To be obtained in the Pre-Procedure area  Allergies Reviewed: Yes  Anticoag/NSAID held?: NA  Currently on antibiotics?: No  Pregnancy denied?: Yes    Assessment:   1   Piriformis syndrome of right side        Plan: Right piriformis injection

## 2021-09-15 NOTE — DISCHARGE INSTRUCTIONS
1  Do not apply heat to any area that is numb  If you have discomfort or soreness at the injection site, you may apply ice today, 20 minutes on and 20 minutes off  Tomorrow you may use ice or warm, moist heat  Do not apply ice or heat directly to the skin  2  If you experience severe shortness of breath, go to the Emergency Room  3  You may have numbness for several hours from the local anesthetic  Please use caution and common sense, especially with weight-bearing activities  4  You may have an increase or change in the discomfort for 36-48 hours after your treatment  Apply ice and continue with any pain medicine you have been prescribed  5  Do not do anything strenuous today  You may shower, but no tub baths or hot tubs today  You may resume your normal activities tomorrow, but do not overdo it  Resume normal activities slowly when you are feeling better  6  If you experience redness, drainage or swelling at the injection site, or if you develop a fever above 100 degrees, please call [facility] at [phone] or go to the Emergency Room  7  Continue to take all routine medicines prescribed by your primary care physician unless otherwise instructed by our staff  Most blood thinners should be started again according to your regularly scheduled dosing  If you have any questions, please give our office a call  As no general anesthesia was used in today's procedure, you should not experience any side effects related to anesthesia  If you have a problem specifically related to your procedure, please call our office at [phone]  Problems not related to your procedure should be directed to your primary care physician

## 2021-09-22 ENCOUNTER — TELEPHONE (OUTPATIENT)
Dept: PAIN MEDICINE | Facility: CLINIC | Age: 46
End: 2021-09-22

## 2021-09-22 NOTE — TELEPHONE ENCOUNTER
Patient:   KAI PALMER            MRN: [id number]            FIN: [id number]               Age:   3 days     Sex:  MALE     :  18   Associated Diagnoses:   None   Author:   CHIKA HIGHTOWER       Infant Discharge Summary   Birth information   Weeks: 39+4  Wt (gm):4065   Ht (cm):49.5   HC (cm): 36  Size: AGA  ABO/Alicia: not checked, mother is B+  Prenatal dx: macrosomia   Delivery Information   ROM: 2018 15:02  Delivery Date: 2018 15:02    or C/S: c/s secondary to macrosomia   Induction/Augmentation: n/a  Anesthesia: spinal  Complications: none  Resuscitation: routine stimulation and suction   Vessel cord: 3 vessel  APGARS: 9  PMD: Dr. Johnson  Maternal Information   Name: Kimmy Palmer  Age: 26  G/P: -->1  Pregnancy complications: presumed fetal macrosomia  Maternal Labs:   ABO: B+  GBS: neg  Hep B sAg: neg  HIV: neg  Rubella: immune  RPR: non reactive   Varicella: immune     Physical Exam:   VS:   T 37.5  P 140  RR 40  I/O 2v/3s  General: Good tone and cry   Head: Normocephalic, AFOSF   Eyes: No conjunctivitis, +RR b/l, mild palpebral edema  Ears/Nose/Throat: Normal ear structure and position, no preauricular pits or tags, nares patent b/l, palate intact by palpation   Neck/Clavicles: Intact, symmetric, no crepitus.   Lungs: CTA bilat, no grunting, no retractions   Heart: RRR, no murmurs, brachial and femoral pulses 2+ bilat   Abdomen: Soft, non-tender, non-distended, normoactive bowel sounds, no mass   Genitalia: Normal female genitalia   Anus: Patent   Trunk/Spine: No curvature/asymmetry, no dimpling or gay   Extremities: Moving all extremities equally against gravity. Hips stable, negative ortolani/mason bilat   Skin: No jaundice, rashes, or lesions   Neuro/Reflexes: Intact rooting and suck reflexes, intact and symmetric cora and palmar/plantar grasp. Upgoing Babinski present symmetrically.  Hospital course:   Pt is a 3 day old male born  1st attempt  Lm for patient to call back with % improvement and pain level.     on 2018 via C section due to presumed fetal macrosomia, to a 26y B+ -->1 mother at39+4weeks. GBS negative and prenatal labs were normal. No complications. Wt change noted to be -8.9%; WNL. Passed all routine  screening. Continued to feed, void, and stool appropriately. All parental questions answers and addressed. Pt is doing well. Continue routine  care. Follow up will be scheduled with PCP  mon 9am .   Assessment:   A: Full term AGA male  born at 39 4/7 weeks gestation via  due to presumed fetal macrosomia, to a 25 yo -->1 mother who was GBS negative with normal prenatal labs. Baby doing well.   Plan:   - Routine  care. Can be discharged home with mother today.  - Continue to breast feed ad eyal based on feeding cues, not to excceed more than 4 hours between feedings  - Total Bilirubin level 5.1 @27 HOL, which is in the low risk zone, and below phototherapy treatment level for a low risk infant.  - CCHD screening passed  - Hearing screen passed bilaterally  - Hepatitis B vaccination done  - NBS sent – to be followed up by PCP   - Follow up with Dr. Johnson on 18 at 0900. Appointment given to parents    Gisell Sanabria, PGY1  Page via [employer]     Attending addendum: I have personally seen and examined Baby Abran Maher during family centered morning rounds this morning, reviewed chart, and discussed plan as outlined above with residents, RN, and family. I agree with the note as documented by Dr. Gisell Sanabria, PGY1, with my additions/corrections, and my physical exam, incorporated in the note above.  I discussed the plan with baby's parents, who stated understanding of, and agreement with, plan. Routine  care  and anticipatory guidance given, including, but not limited to, SIDS prevention, carseat safety, normal feeding patterns, reasons to call the pediatirican (temp over 100.4F, difficulty feeding, fast or  irregular breathing, rashes, other concerns), and normal  care at home. All of their questions and concerns were addressed.  Vi Verde DO  Pediatric Hospitalist  [place], [place]                  Electronically Signed On 2018 15:08  __________________________________________________   CHIKA HIGHTOWER      Electronically Signed On 2018 15:40  __________________________________________________   VI LIRA

## 2021-10-15 ENCOUNTER — OFFICE VISIT (OUTPATIENT)
Dept: PAIN MEDICINE | Facility: CLINIC | Age: 46
End: 2021-10-15
Payer: COMMERCIAL

## 2021-10-15 VITALS
SYSTOLIC BLOOD PRESSURE: 110 MMHG | WEIGHT: 222 LBS | HEART RATE: 88 BPM | DIASTOLIC BLOOD PRESSURE: 76 MMHG | HEIGHT: 65 IN | BODY MASS INDEX: 36.99 KG/M2

## 2021-10-15 DIAGNOSIS — M48.061 SPINAL STENOSIS OF LUMBAR REGION, UNSPECIFIED WHETHER NEUROGENIC CLAUDICATION PRESENT: ICD-10-CM

## 2021-10-15 DIAGNOSIS — M47.816 LUMBAR SPONDYLOSIS: ICD-10-CM

## 2021-10-15 DIAGNOSIS — M54.16 LUMBAR RADICULOPATHY: Primary | ICD-10-CM

## 2021-10-15 DIAGNOSIS — G57.01 PIRIFORMIS SYNDROME OF RIGHT SIDE: ICD-10-CM

## 2021-10-15 PROCEDURE — 1036F TOBACCO NON-USER: CPT | Performed by: ANESTHESIOLOGY

## 2021-10-15 PROCEDURE — 99214 OFFICE O/P EST MOD 30 MIN: CPT | Performed by: ANESTHESIOLOGY

## 2021-10-15 PROCEDURE — 3008F BODY MASS INDEX DOCD: CPT | Performed by: ANESTHESIOLOGY

## 2021-10-15 RX ORDER — PREGABALIN 75 MG/1
75 CAPSULE ORAL 3 TIMES DAILY
Qty: 90 CAPSULE | Refills: 1 | Status: SHIPPED | OUTPATIENT
Start: 2021-10-15 | End: 2022-03-03 | Stop reason: SDUPTHER

## 2021-11-05 ENCOUNTER — ANNUAL EXAM (OUTPATIENT)
Dept: OBGYN CLINIC | Facility: MEDICAL CENTER | Age: 46
End: 2021-11-05
Payer: COMMERCIAL

## 2021-11-05 VITALS
BODY MASS INDEX: 37.59 KG/M2 | SYSTOLIC BLOOD PRESSURE: 112 MMHG | WEIGHT: 225.6 LBS | DIASTOLIC BLOOD PRESSURE: 82 MMHG | HEIGHT: 65 IN

## 2021-11-05 DIAGNOSIS — Z12.31 ENCOUNTER FOR SCREENING MAMMOGRAM FOR MALIGNANT NEOPLASM OF BREAST: Primary | ICD-10-CM

## 2021-11-05 DIAGNOSIS — Z12.11 ENCOUNTER FOR SCREENING COLONOSCOPY: ICD-10-CM

## 2021-11-05 DIAGNOSIS — Z01.419 ENCOUNTER FOR GYNECOLOGICAL EXAMINATION WITHOUT ABNORMAL FINDING: ICD-10-CM

## 2021-11-05 PROCEDURE — G0476 HPV COMBO ASSAY CA SCREEN: HCPCS | Performed by: STUDENT IN AN ORGANIZED HEALTH CARE EDUCATION/TRAINING PROGRAM

## 2021-11-05 PROCEDURE — S0612 ANNUAL GYNECOLOGICAL EXAMINA: HCPCS | Performed by: STUDENT IN AN ORGANIZED HEALTH CARE EDUCATION/TRAINING PROGRAM

## 2021-11-05 PROCEDURE — G0145 SCR C/V CYTO,THINLAYER,RESCR: HCPCS | Performed by: STUDENT IN AN ORGANIZED HEALTH CARE EDUCATION/TRAINING PROGRAM

## 2021-11-08 ENCOUNTER — RA CDI HCC (OUTPATIENT)
Dept: OTHER | Facility: HOSPITAL | Age: 46
End: 2021-11-08

## 2021-11-09 ENCOUNTER — OFFICE VISIT (OUTPATIENT)
Dept: DERMATOLOGY | Facility: CLINIC | Age: 46
End: 2021-11-09
Payer: COMMERCIAL

## 2021-11-09 VITALS — WEIGHT: 225 LBS | HEIGHT: 65 IN | BODY MASS INDEX: 37.49 KG/M2 | TEMPERATURE: 98.1 F

## 2021-11-09 DIAGNOSIS — D18.01 CHERRY ANGIOMA: ICD-10-CM

## 2021-11-09 DIAGNOSIS — D22.9 NEVUS: Primary | ICD-10-CM

## 2021-11-09 DIAGNOSIS — L85.3 XEROSIS CUTIS: ICD-10-CM

## 2021-11-09 DIAGNOSIS — L81.4 LENTIGO: ICD-10-CM

## 2021-11-09 PROCEDURE — 99202 OFFICE O/P NEW SF 15 MIN: CPT | Performed by: DERMATOLOGY

## 2021-11-09 PROCEDURE — 3008F BODY MASS INDEX DOCD: CPT | Performed by: ANESTHESIOLOGY

## 2021-11-11 LAB
LAB AP GYN PRIMARY INTERPRETATION: NORMAL
Lab: NORMAL

## 2021-11-12 LAB
HPV HR 12 DNA CVX QL NAA+PROBE: NEGATIVE
HPV16 DNA CVX QL NAA+PROBE: NEGATIVE
HPV18 DNA CVX QL NAA+PROBE: NEGATIVE

## 2022-01-13 ENCOUNTER — RA CDI HCC (OUTPATIENT)
Dept: OTHER | Facility: HOSPITAL | Age: 47
End: 2022-01-13

## 2022-01-13 NOTE — PROGRESS NOTES
[facility] [address] [facility]  coding opportunities      Chart Reviewed * (Number of) Inbasket suggestions sent to Provider: 1         Number of suggestions used: 1     Patients insurance company: [employer] (Medicare Advantage and Commercial)     Visit status: Patient arrived for their scheduled appointment        [facility]  coding opportunities       Chart Reviewed * (Number of) Inbasket suggestions sent to Provider: 1           Patients insurance company: [id number] (Medicare Advantage and Commercial)       Appt on 1/20/22    Depression: - Can Depression be further classified using more specific code from any of the following ? F33 0  Major depressive disorder, recurrent, mild (HCC) OR   F33 1: Major depressive disorder, recurrent, moderate (HCC)  OR  F33 2  Major depressive disorder, recurrent, severe without psychotic features ([address] ) OR  F33 3: Major depressive disorder, recurrent, severe, with psychotic symptoms ([address] ) OR   F33 40: Major depressive disorder, recurrent, in remission, unspecified ([address] ) OR  F33 41:  Major depressive disorder, recurrent, in partial remission (HCC) OR  F33 42   Major depressive disorder, recurrent, in full remission ([address] ) OR  F33 8   Other recurrent depressive disorders ([address] ) OR  F33 9   Major depressive disorder, recurrent, unspecified ([address] )

## 2022-01-20 ENCOUNTER — OFFICE VISIT (OUTPATIENT)
Dept: INTERNAL MEDICINE CLINIC | Facility: OTHER | Age: 47
End: 2022-01-20
Payer: COMMERCIAL

## 2022-01-20 ENCOUNTER — TELEPHONE (OUTPATIENT)
Dept: ADMINISTRATIVE | Facility: OTHER | Age: 47
End: 2022-01-20

## 2022-01-20 VITALS
DIASTOLIC BLOOD PRESSURE: 86 MMHG | HEART RATE: 91 BPM | SYSTOLIC BLOOD PRESSURE: 120 MMHG | WEIGHT: 234 LBS | TEMPERATURE: 98.2 F | HEIGHT: 65 IN | OXYGEN SATURATION: 98 % | BODY MASS INDEX: 38.99 KG/M2

## 2022-01-20 DIAGNOSIS — F33.9 DEPRESSION, RECURRENT (HCC): ICD-10-CM

## 2022-01-20 DIAGNOSIS — F41.9 ANXIETY AND DEPRESSION: Primary | ICD-10-CM

## 2022-01-20 DIAGNOSIS — F32.A ANXIETY AND DEPRESSION: Primary | ICD-10-CM

## 2022-01-20 DIAGNOSIS — E78.00 HYPERCHOLESTEROLEMIA: ICD-10-CM

## 2022-01-20 PROBLEM — M54.42 ACUTE LEFT-SIDED LOW BACK PAIN WITH LEFT-SIDED SCIATICA: Status: RESOLVED | Noted: 2020-09-11 | Resolved: 2022-01-20

## 2022-01-20 PROBLEM — M54.16 LUMBAR RADICULOPATHY: Status: RESOLVED | Noted: 2021-01-22 | Resolved: 2022-01-20

## 2022-01-20 PROBLEM — M51.26 LUMBAR DISC HERNIATION: Status: RESOLVED | Noted: 2021-01-22 | Resolved: 2022-01-20

## 2022-01-20 PROCEDURE — 3725F SCREEN DEPRESSION PERFORMED: CPT | Performed by: INTERNAL MEDICINE

## 2022-01-20 PROCEDURE — 99214 OFFICE O/P EST MOD 30 MIN: CPT | Performed by: INTERNAL MEDICINE

## 2022-01-20 PROCEDURE — 1036F TOBACCO NON-USER: CPT | Performed by: INTERNAL MEDICINE

## 2022-01-20 PROCEDURE — 3008F BODY MASS INDEX DOCD: CPT | Performed by: INTERNAL MEDICINE

## 2022-01-20 NOTE — TELEPHONE ENCOUNTER
Upon review of the In Basket request we were able to locate, review, and update the patient chart as requested for Mammogram     Any additional questions or concerns should be emailed to the Practice Liaisons via [email or web address] email, please do not reply via In Basket      Thank you  Everette Cushing

## 2022-01-20 NOTE — TELEPHONE ENCOUNTER
----- Message from João Gutierrez MA sent at 1/20/2022 10:40 AM EST -----  Regarding: Mammo  01/20/22 10:40 AM    Hello, our patient Candis Stuart has had Mammogram completed/performed  Please assist in updating the patient chart by pulling the Care Everywhere (CE) document  The date of service is 12/3/2021       Thank you,  João Gutierrez, [address] [facility] [place]

## 2022-01-20 NOTE — PROGRESS NOTES
Assessment/Plan:     Diagnoses and all orders for this visit:    Anxiety and depression  -     CBC and differential; Future  -     UA w Reflex to Microscopic w Reflex to Culture    Hypercholesterolemia  -     Hemoglobin A1C; Future  -     Lipid panel; Future  -     TSH, 3rd generation with Free T4 reflex; Future  -     Comprehensive metabolic panel; Future    BMI 33 0-33 9,adult  -     Hemoglobin A1C; Future  -     Ambulatory Referral to Weight Management; Future    Depression, recurrent [facility])             M*Modal software was used to dictate this note  It may contain errors with dictating incorrect words or incorrect spelling  Please contact the provider directly with any questions  Subjective:   Chief Complaint   Patient presents with    Follow-up    Anxiety    Depression         12/3/2021 Mammo done at [facility] care gap contacted         Patient ID: Shelly Prado is a 55 y o  female  HPI  This is a very pleasant 55 years young lady who is here today for the regular follow-up with her anxiety and depression she is doing well  Hypercholesterolemia will get the blood workup for further evaluation she have not had a recent blood workup done in last 6 months  Discussed about the diet and exercise and weight loss she is interested in losing weight and I put a consult for the weight management program at [address] [place] [address]  I will follow her up in 3 months and she will get the blood workup  The following portions of the patient's history were reviewed and updated as appropriate: allergies, current medications, past family history, past medical history, past social history, past surgical history and problem list     Review of Systems   Constitutional: Negative for chills and fatigue  HENT: Negative for congestion, ear pain, hearing loss, postnasal drip, sinus pressure, sore throat and voice change  Eyes: Negative for pain, discharge and visual disturbance     Respiratory: Negative for cough, chest tightness and shortness of breath  Cardiovascular: Negative for chest pain, palpitations and leg swelling  Gastrointestinal: Negative for abdominal pain, blood in stool, diarrhea, nausea and rectal pain  Genitourinary: Negative for difficulty urinating, dysuria and urgency  Musculoskeletal: Negative for arthralgias and joint swelling  Skin: Negative for rash  Allergic/Immunologic: Negative for environmental allergies and food allergies  Neurological: Negative for dizziness, tremors, weakness, numbness and headaches  Hematological: Negative for adenopathy  Psychiatric/Behavioral: Positive for dysphoric mood (She lost her mom and also 1 of the and an uncle  She is getting the counseling   Overall she is doing better than before)  Negative for behavioral problems and hallucinations  The patient is nervous/anxious            Past Medical History:   Diagnosis Date    Back disorder     Depression     PONV (postoperative nausea and vomiting)          Current Outpatient Medications:     acetaminophen (TYLENOL) 500 mg tablet, Take 2 tablets (1,000 mg total) by mouth every 8 (eight) hours, Disp: 30 tablet, Rfl: 0    escitalopram (LEXAPRO) 10 mg tablet, Take 1 tablet (10 mg total) by mouth daily at bedtime, Disp: 90 tablet, Rfl: 1    multivitamin-minerals (CENTRUM ADULTS) tablet, Take 1 tablet by mouth daily, Disp: , Rfl:     pregabalin (LYRICA) 75 mg capsule, Take 1 capsule (75 mg total) by mouth 3 (three) times a day (Patient taking differently: Take 75 mg by mouth 2 (two) times a day  ), Disp: 90 capsule, Rfl: 1    rosuvastatin (CRESTOR) 10 MG tablet, Take 1 tablet (10 mg total) by mouth daily, Disp: 90 tablet, Rfl: 1    Allergies   Allergen Reactions    Amoxicillin Rash       Social History   Past Surgical History:   Procedure Laterality Date    BACK SURGERY      Lumbar( L3)     SECTION  ,2008    X2    CHOLECYSTECTOMY      ENDOMETRIAL BIOPSY      EXAMINATION UNDER ANESTHESIA N/A 5/15/2020    Procedure: EXAM UNDER ANESTHESIA (EUA); Surgeon: Luisa Brannon MD;  [facility];  Service: Gynecology    INDUCED       times 2    SC HYSTEROSCOPY,W/ENDO BX N/A 5/15/2020    Procedure: DILATATION AND CURETTAGE (D&C) WITH HYSTEROSCOPY;  Surgeon: Luisa Brannon MD;  [facility];  Service: Gynecology    SC INSERT INTRAUTERINE DEVICE N/A 5/15/2020    Procedure: Mirena insertion;  Surgeon: Luisa Brannon MD;  [facility];  Service: Gynecology    SC LAPAROSCOPY W TOT HYSTERECTUTERUS <=250 GRAM  W TUBE/OVARY N/A 2021    Procedure: ROBOTIC ASSISTED LAPAROSCOPIC HYSTERECTOMY BILATERAL SALPINGECTOMY EUA, REMOVAL OF IUD, LYSIS OF [place] Reasons;  Surgeon: Luisa Brannon MD;  [facility];  Service: Gynecology    TUBAL LIGATION      WISDOM TOOTH EXTRACTION  10/05/2020     Family History   Problem Relation Age of Onset    Diabetes Mother     Hypertension Mother     Sudden death Mother     Anxiety disorder Family         symptom    Other Family         breast disorders    Heart disease Other         cardiac disorder    Heart disease Other         cardiac disorder    Depression Father     Depression Sister     Colon cancer Neg Hx     Ovarian cancer Neg Hx     Cervical cancer Neg Hx     Uterine cancer Neg Hx        Objective:  /86 (BP Location: Left arm, Patient Position: Sitting, Cuff Size: Adult)   Pulse 91   Temp 98 2 °F (36 8 °C)   Ht 5' 5" (1 651 m)   Wt 106 kg (234 lb)   LMP 05/15/2020   SpO2 98%   BMI 38 94 kg/m²        Physical Exam  Constitutional:       Appearance: She is well-developed  HENT:      Right Ear: External ear normal    Eyes:      Conjunctiva/sclera: Conjunctivae normal       Pupils: Pupils are equal, round, and reactive to light  Neck:      Thyroid: No thyromegaly  Vascular: No JVD  Cardiovascular:      Rate and Rhythm: Normal rate and regular rhythm        Heart sounds: Normal heart sounds  Pulmonary:      Breath sounds: Normal breath sounds  Abdominal:      General: Bowel sounds are normal       Palpations: Abdomen is soft  Musculoskeletal:         General: Normal range of motion  Cervical back: Normal range of motion  Lymphadenopathy:      Cervical: No cervical adenopathy  Skin:     General: Skin is dry  Neurological:      Mental Status: She is alert and oriented to person, place, and time  Deep Tendon Reflexes: Reflexes are normal and symmetric     Psychiatric:         Behavior: Behavior normal

## 2022-03-03 DIAGNOSIS — F41.9 ANXIETY AND DEPRESSION: ICD-10-CM

## 2022-03-03 DIAGNOSIS — L65.9 HAIR LOSS: ICD-10-CM

## 2022-03-03 DIAGNOSIS — F32.A ANXIETY AND DEPRESSION: ICD-10-CM

## 2022-03-03 DIAGNOSIS — E78.00 HYPERCHOLESTEROLEMIA: ICD-10-CM

## 2022-03-03 DIAGNOSIS — M54.16 LUMBAR RADICULOPATHY: ICD-10-CM

## 2022-03-03 RX ORDER — PREGABALIN 75 MG/1
75 CAPSULE ORAL 3 TIMES DAILY
Qty: 90 CAPSULE | Refills: 2 | Status: SHIPPED | OUTPATIENT
Start: 2022-03-03 | End: 2022-06-30

## 2022-03-03 RX ORDER — ROSUVASTATIN CALCIUM 10 MG/1
10 TABLET, COATED ORAL DAILY
Qty: 90 TABLET | Refills: 1 | Status: SHIPPED | OUTPATIENT
Start: 2022-03-03

## 2022-03-03 RX ORDER — ESCITALOPRAM OXALATE 10 MG/1
10 TABLET ORAL
Qty: 90 TABLET | Refills: 1 | Status: SHIPPED | OUTPATIENT
Start: 2022-03-03

## 2022-03-03 NOTE — TELEPHONE ENCOUNTER
3 month refill sent to pharmacy    Patient should schedule follow-up office visit once insurance is re-initiated

## 2022-03-03 NOTE — TELEPHONE ENCOUNTER
S/w the patient to review  She has no ovs scheduled and it looks like it was last ordered on 10/15/21 c/ 1 refill  She stated she has been doing fine on it but the problem is she had to cancel her last appointment because she has no insurance  She stated she will have insurance in April  Please advise   thanks

## 2022-04-11 ENCOUNTER — RA CDI HCC (OUTPATIENT)
Dept: OTHER | Facility: HOSPITAL | Age: 47
End: 2022-04-11

## 2022-04-11 NOTE — PROGRESS NOTES
[facility]  coding opportunities       Chart reviewed, no opportunity found: CHART REVIEWED, NO OPPORTUNITY FOUND        Patients Insurance        Commercial Insurance: [address] [place] [address]

## 2022-06-23 ENCOUNTER — TELEPHONE (OUTPATIENT)
Dept: INTERNAL MEDICINE CLINIC | Age: 47
End: 2022-06-23

## 2022-06-23 ENCOUNTER — OFFICE VISIT (OUTPATIENT)
Dept: INTERNAL MEDICINE CLINIC | Age: 47
End: 2022-06-23
Payer: COMMERCIAL

## 2022-06-23 VITALS
WEIGHT: 218.9 LBS | OXYGEN SATURATION: 98 % | HEIGHT: 65 IN | TEMPERATURE: 97.9 F | BODY MASS INDEX: 36.47 KG/M2 | HEART RATE: 90 BPM | SYSTOLIC BLOOD PRESSURE: 112 MMHG | DIASTOLIC BLOOD PRESSURE: 84 MMHG

## 2022-06-23 DIAGNOSIS — Z79.899 ENCOUNTER FOR LONG-TERM (CURRENT) USE OF MEDICATIONS: ICD-10-CM

## 2022-06-23 DIAGNOSIS — E78.00 HYPERCHOLESTEROLEMIA: Primary | ICD-10-CM

## 2022-06-23 DIAGNOSIS — J06.9 UPPER RESPIRATORY TRACT INFECTION, UNSPECIFIED TYPE: Primary | ICD-10-CM

## 2022-06-23 DIAGNOSIS — Z13.1 SCREENING FOR DIABETES MELLITUS: ICD-10-CM

## 2022-06-23 PROCEDURE — 99213 OFFICE O/P EST LOW 20 MIN: CPT | Performed by: STUDENT IN AN ORGANIZED HEALTH CARE EDUCATION/TRAINING PROGRAM

## 2022-06-23 PROCEDURE — 1036F TOBACCO NON-USER: CPT | Performed by: STUDENT IN AN ORGANIZED HEALTH CARE EDUCATION/TRAINING PROGRAM

## 2022-06-23 RX ORDER — LORATADINE 10 MG/1
10 TABLET ORAL DAILY
COMMUNITY
End: 2022-06-30

## 2022-06-23 RX ORDER — CEFDINIR 300 MG/1
300 CAPSULE ORAL EVERY 12 HOURS SCHEDULED
Qty: 10 CAPSULE | Refills: 0 | Status: SHIPPED | OUTPATIENT
Start: 2022-06-23 | End: 2022-06-28

## 2022-06-23 NOTE — TELEPHONE ENCOUNTER
Pt asked if she is to have blood work done for her cholesterol for her upcoming follow up appt on 6/30/22  Only CBC, TSH, & UA ordered  Please advise, thank you!

## 2022-06-23 NOTE — PROGRESS NOTES
INTERNAL MEDICINE FOLLOW-UP OFFICE VISIT  [facility]    NAME: Shan Reese  AGE: 55 y o  SEX: female    DATE OF ENCOUNTER: 6/23/2022    Assessment and Plan     1  Upper respiratory tract infection, unspecified type  Patient with multiple symptoms going on for the last 3 weeks  Based on history and exam, there is concerned that she may have developed bacterial postviral superinfection  At this time, we will treat with a 5 day course of cefdinir 300 mg b i d  and ask the patient to monitor her symptoms closely  She was informed to contact our office if she does not notice improvement after antibiotic course  She is advised to continue other supportive measures as she has been  She was advised to hydrate to 2-3 L per day and continue her daily multivitamin  Of note, patient has an allergy of rash when receiving amoxicillin but has successfully received cephalosporin in 2021 perioperatively without adverse effect  - cefdinir (OMNICEF) 300 mg capsule; Take 1 capsule (300 mg total) by mouth every 12 (twelve) hours for 5 days  Dispense: 10 capsule; Refill: 0    No orders of the defined types were placed in this encounter       - Counseling Documentation: patient was counseled regarding: diagnostic results, instructions for management, risk factor reductions, prognosis, patient and family education, impressions, risks and benefits of treatment options and importance of compliance with treatment  - Counseling Time: counseling time more than 50% of visit: 30 minutes  - Medication Side Effects: Adverse side effects of medications were reviewed with the patient/guardian today  Routine Health Maintenance:    Advised diet and exercise  Advised to refrain from tobacco, alcohol, and illicit drug use  Advised medical compliance      OMT/OPP: During the course of my history and physical, I utilized osteopathic examination modalities to assess the patient  Somatic dysfunctions were not identified during this visit   OMT is not indicated at this time  Chief Complaint     Chief Complaint   Patient presents with    Cold Like Symptoms     Tested on 6/11/22- negative- Symptoms started 3 weeks ago- Pt states she has a sore throat, lymph node swollen, LEFT EAR pain, post nasal drip- Cough(Productive, yellow mucus, mainly when lying down)       History of Present Illness     Jacinta Mendoza is a 55 y o  female with past medical history significant for depression, anxiety, allergies, and vitamin-D deficiency who presents as a same-day visit  Patient has been symptomatic for the last 3-4 weeks  She reports at the onset of her symptoms, she was experiencing nasal congestion and subjective fever  Fever improved quickly  She also complains of left ear pain and fullness, soreness in her throat, particularly on the left side, when she swallows, fatigue, and postnasal drip  She complains of coughing only when she attempts to lie down flat  She complains of enlarged lymph node on the left side of her neck  She tells me that at the start of her symptoms, she took Tylenol cold and flu with no improvement  She has a history of allergies in the springtime and has recently been taking Claritin 10 mg daily in an effort to help with her symptoms  She tells me that she tested negative for COVID-19 infection via home testing kit on June 11th  Of note, she is the mother of 2 teenage daughters, both of whom live with her  She tells me that both of her daughters have been sick recently  She says that 1 of her daughters is experiencing congestion and lymphadenopathy and was recently prescribed antibiotics (Augmentin); she tells me that her other daughter is experiencing cough and fever and was recently started on Tessalon  She tells me that her children have tested negative for COVID, flu, and mononucleosis        The following portions of the patient's history were reviewed and updated as appropriate: allergies, current medications, past family history, past medical history, past social history, past surgical history and problem list     Review of Systems     Review of Systems   Constitutional: Negative for chills, fatigue and fever  HENT: Positive for ear pain, postnasal drip and sore throat  Negative for congestion, rhinorrhea, sinus pressure, sinus pain and trouble swallowing  Respiratory: Positive for cough  Negative for shortness of breath and wheezing  Cardiovascular: Negative for chest pain and palpitations  Gastrointestinal: Negative for abdominal pain, constipation, diarrhea, nausea and vomiting  Genitourinary: Negative for difficulty urinating, dysuria, frequency, hematuria and urgency  Musculoskeletal: Negative for arthralgias, back pain, gait problem and myalgias  Skin: Negative for pallor, rash and wound  Neurological: Negative for dizziness, weakness, light-headedness, numbness and headaches  Active Problem List     Patient Active Problem List   Diagnosis    Hair loss    Anxiety and depression    BMI 33 0-33 9,adult    Vitamin D deficiency    Seasonal allergies    Hypercholesterolemia    Lumbar spondylosis    Spinal stenosis of lumbar region    History of robot-assisted laparoscopic hysterectomy    Chronic pain syndrome    Urinary urgency    Piriformis syndrome of right side    Encounter for gynecological examination without abnormal finding    Depression, recurrent (HCC)       Objective     /84 (BP Location: Left arm, Patient Position: Sitting, Cuff Size: Standard)   Pulse 90   Temp 97 9 °F (36 6 °C) (Temporal)   Ht 5' 5" (1 651 m)   Wt 99 3 kg (218 lb 14 4 oz)   LMP 05/15/2020   SpO2 98% Comment: room air  BMI 36 43 kg/m²     Physical Exam  Vitals and nursing note reviewed  Constitutional:       General: She is not in acute distress  Appearance: Normal appearance  She is obese  She is not ill-appearing, toxic-appearing or diaphoretic     HENT:      Head: Normocephalic and atraumatic  Right Ear: Tympanic membrane, ear canal and external ear normal  No middle ear effusion  Left Ear: Ear canal normal  A middle ear effusion is present  Nose:      Right Turbinates: Swollen  Not enlarged  Left Turbinates: Swollen  Not enlarged  Right Sinus: No maxillary sinus tenderness or frontal sinus tenderness  Left Sinus: No maxillary sinus tenderness or frontal sinus tenderness  Mouth/Throat:      Pharynx: Posterior oropharyngeal erythema present  No pharyngeal swelling  Eyes:      General: No scleral icterus  Extraocular Movements: Extraocular movements intact  Conjunctiva/sclera: Conjunctivae normal       Pupils: Pupils are equal, round, and reactive to light  Cardiovascular:      Rate and Rhythm: Normal rate and regular rhythm  Pulses: Normal pulses  Heart sounds: Normal heart sounds  No murmur heard  No friction rub  No gallop  Pulmonary:      Effort: Pulmonary effort is normal  No respiratory distress  Breath sounds: Normal breath sounds  No stridor  No wheezing or rhonchi  Chest:   Breasts:      Right: No supraclavicular adenopathy  Left: No supraclavicular adenopathy  Abdominal:      General: Bowel sounds are normal  There is no distension  Palpations: Abdomen is soft  There is no mass  Tenderness: There is no abdominal tenderness  There is no guarding or rebound  Hernia: No hernia is present  Musculoskeletal:         General: No swelling, tenderness or deformity  Cervical back: Neck supple  Lymphadenopathy:      Head:      Right side of head: No submandibular adenopathy  Left side of head: Submandibular adenopathy present  Cervical: No cervical adenopathy  Upper Body:      Right upper body: No supraclavicular adenopathy  Left upper body: No supraclavicular adenopathy  Skin:     General: Skin is warm and dry        Capillary Refill: Capillary refill takes less than 2 seconds  Coloration: Skin is not pale  Findings: No erythema or rash  Neurological:      General: No focal deficit present  Mental Status: She is alert  Psychiatric:         Mood and Affect: Mood normal          Behavior: Behavior normal          Thought Content: Thought content normal          Judgment: Judgment normal          Pertinent Laboratory/Diagnostic Studies:  No results found      Images and diagnostics reviewed     Current Medications     Current Outpatient Medications:     cefdinir (OMNICEF) 300 mg capsule, Take 1 capsule (300 mg total) by mouth every 12 (twelve) hours for 5 days, Disp: 10 capsule, Rfl: 0    escitalopram (LEXAPRO) 10 mg tablet, Take 1 tablet (10 mg total) by mouth daily at bedtime, Disp: 90 tablet, Rfl: 1    loratadine (CLARITIN) 10 mg tablet, Take 10 mg by mouth daily, Disp: , Rfl:     multivitamin-minerals (CENTRUM ADULTS) tablet, Take 1 tablet by mouth daily, Disp: , Rfl:     pregabalin (LYRICA) 75 mg capsule, Take 1 capsule (75 mg total) by mouth 3 (three) times a day (Patient taking differently: Take 75 mg by mouth daily), Disp: 90 capsule, Rfl: 2    rosuvastatin (CRESTOR) 10 MG tablet, Take 1 tablet (10 mg total) by mouth daily, Disp: 90 tablet, Rfl: 1    acetaminophen (TYLENOL) 500 mg tablet, Take 2 tablets (1,000 mg total) by mouth every 8 (eight) hours (Patient not taking: Reported on 6/23/2022), Disp: 30 tablet, Rfl: 0    Health Maintenance     Health Maintenance   Topic Date Due    Hepatitis C Screening  Never done    Annual Physical  06/28/2020    Colorectal Cancer Screening  Never done    COVID-19 Vaccine (3 - Booster for Moderna series) 07/27/2021    DTaP,Tdap,and Td Vaccines (2 - Td or Tdap) 12/20/2021    BMI: Followup Plan  03/02/2022    Influenza Vaccine (Season Ended) 09/01/2022    Breast Cancer Screening: Mammogram  12/03/2022    Depression Remission PHQ  01/20/2023    BMI: Adult  06/23/2023    HIV Screening  Completed    Pneumococcal Vaccine: Pediatrics (0 to 5 Years) and At-Risk Patients (6 to 59 Years)  Aged Out    HIB Vaccine  Aged Out    Hepatitis B Vaccine  Aged Out    IPV Vaccine  Aged Out    Hepatitis A Vaccine  Aged Out    Meningococcal ACWY Vaccine  Aged Out    HPV Vaccine  Aged [employer] History   Administered Date(s) Administered    COVID-19 MODERNA VACC 0 5 ML IM 01/23/2021, 02/27/2021    Tdap 12/20/2011       Portions of this note may have been created with voice recognition software  Occasional wrong word or sound a like substitutions may have occurred due to inherent limitations of voice recognition software  Read the chart carefully and recognize, using context, where substitutions have occurred  Global time spent on encounter: 30 minutes    NOLAN Jeff  Internal Medicine PGY-3  [address] at the [facility] , [address] [address] [facility], [address] [facility]  Office: [phone]  Fax: [phone]

## 2022-06-23 NOTE — TELEPHONE ENCOUNTER
Saw a little more and the orders for the blood work were put in for this patient however, the lipid Panel, CMP and Hemoglobin A1C was cancel and  back in 2022    Can we have that put in for her to get everything before her next appt

## 2022-06-27 ENCOUNTER — APPOINTMENT (OUTPATIENT)
Dept: LAB | Facility: IMAGING CENTER | Age: 47
End: 2022-06-27
Payer: COMMERCIAL

## 2022-06-27 DIAGNOSIS — F32.A ANXIETY AND DEPRESSION: ICD-10-CM

## 2022-06-27 DIAGNOSIS — Z79.899 ENCOUNTER FOR LONG-TERM (CURRENT) USE OF MEDICATIONS: ICD-10-CM

## 2022-06-27 DIAGNOSIS — Z13.1 SCREENING FOR DIABETES MELLITUS: ICD-10-CM

## 2022-06-27 DIAGNOSIS — F41.9 ANXIETY AND DEPRESSION: ICD-10-CM

## 2022-06-27 DIAGNOSIS — E78.00 HYPERCHOLESTEROLEMIA: ICD-10-CM

## 2022-06-27 LAB
ALBUMIN SERPL BCP-MCNC: 3.4 G/DL (ref 3.5–5)
ALP SERPL-CCNC: 83 U/L (ref 46–116)
ALT SERPL W P-5'-P-CCNC: 31 U/L (ref 12–78)
ANION GAP SERPL CALCULATED.3IONS-SCNC: 6 MMOL/L (ref 4–13)
AST SERPL W P-5'-P-CCNC: 15 U/L (ref 5–45)
BACTERIA UR QL AUTO: ABNORMAL /HPF
BASOPHILS # BLD AUTO: 0.07 THOUSANDS/ΜL (ref 0–0.1)
BASOPHILS NFR BLD AUTO: 1 % (ref 0–1)
BILIRUB SERPL-MCNC: 0.44 MG/DL (ref 0.2–1)
BILIRUB UR QL STRIP: NEGATIVE
BUN SERPL-MCNC: 15 MG/DL (ref 5–25)
CALCIUM ALBUM COR SERPL-MCNC: 9.5 MG/DL (ref 8.3–10.1)
CALCIUM SERPL-MCNC: 9 MG/DL (ref 8.3–10.1)
CHLORIDE SERPL-SCNC: 106 MMOL/L (ref 100–108)
CHOLEST SERPL-MCNC: 209 MG/DL
CLARITY UR: CLEAR
CO2 SERPL-SCNC: 27 MMOL/L (ref 21–32)
COLOR UR: ABNORMAL
CREAT SERPL-MCNC: 0.86 MG/DL (ref 0.6–1.3)
EOSINOPHIL # BLD AUTO: 0.13 THOUSAND/ΜL (ref 0–0.61)
EOSINOPHIL NFR BLD AUTO: 2 % (ref 0–6)
ERYTHROCYTE [DISTWIDTH] IN BLOOD BY AUTOMATED COUNT: 12.5 % (ref 11.6–15.1)
EST. AVERAGE GLUCOSE BLD GHB EST-MCNC: 123 MG/DL
GFR SERPL CREATININE-BSD FRML MDRD: 81 ML/MIN/1.73SQ M
GLUCOSE P FAST SERPL-MCNC: 116 MG/DL (ref 65–99)
GLUCOSE UR STRIP-MCNC: NEGATIVE MG/DL
HBA1C MFR BLD: 5.9 %
HCT VFR BLD AUTO: 43.4 % (ref 34.8–46.1)
HDLC SERPL-MCNC: 48 MG/DL
HGB BLD-MCNC: 13.8 G/DL (ref 11.5–15.4)
HGB UR QL STRIP.AUTO: NEGATIVE
IMM GRANULOCYTES # BLD AUTO: 0.03 THOUSAND/UL (ref 0–0.2)
IMM GRANULOCYTES NFR BLD AUTO: 0 % (ref 0–2)
KETONES UR STRIP-MCNC: NEGATIVE MG/DL
LDLC SERPL CALC-MCNC: 96 MG/DL (ref 0–100)
LEUKOCYTE ESTERASE UR QL STRIP: NEGATIVE
LYMPHOCYTES # BLD AUTO: 3.04 THOUSANDS/ΜL (ref 0.6–4.47)
LYMPHOCYTES NFR BLD AUTO: 37 % (ref 14–44)
MCH RBC QN AUTO: 29.4 PG (ref 26.8–34.3)
MCHC RBC AUTO-ENTMCNC: 31.8 G/DL (ref 31.4–37.4)
MCV RBC AUTO: 92 FL (ref 82–98)
MONOCYTES # BLD AUTO: 0.4 THOUSAND/ΜL (ref 0.17–1.22)
MONOCYTES NFR BLD AUTO: 5 % (ref 4–12)
MUCOUS THREADS UR QL AUTO: ABNORMAL
NEUTROPHILS # BLD AUTO: 4.47 THOUSANDS/ΜL (ref 1.85–7.62)
NEUTS SEG NFR BLD AUTO: 55 % (ref 43–75)
NITRITE UR QL STRIP: NEGATIVE
NON-SQ EPI CELLS URNS QL MICRO: ABNORMAL /HPF
NONHDLC SERPL-MCNC: 161 MG/DL
NRBC BLD AUTO-RTO: 0 /100 WBCS
PH UR STRIP.AUTO: 6 [PH]
PLATELET # BLD AUTO: 175 THOUSANDS/UL (ref 149–390)
PMV BLD AUTO: 10.4 FL (ref 8.9–12.7)
POTASSIUM SERPL-SCNC: 4.5 MMOL/L (ref 3.5–5.3)
PROT SERPL-MCNC: 7.7 G/DL (ref 6.4–8.2)
PROT UR STRIP-MCNC: ABNORMAL MG/DL
RBC # BLD AUTO: 4.7 MILLION/UL (ref 3.81–5.12)
RBC #/AREA URNS AUTO: ABNORMAL /HPF
SODIUM SERPL-SCNC: 139 MMOL/L (ref 136–145)
SP GR UR STRIP.AUTO: 1.03 (ref 1–1.03)
TRIGL SERPL-MCNC: 324 MG/DL
TSH SERPL DL<=0.05 MIU/L-ACNC: 2.75 UIU/ML (ref 0.45–4.5)
UROBILINOGEN UR STRIP-ACNC: <2 MG/DL
WBC # BLD AUTO: 8.14 THOUSAND/UL (ref 4.31–10.16)
WBC #/AREA URNS AUTO: ABNORMAL /HPF

## 2022-06-27 PROCEDURE — 83036 HEMOGLOBIN GLYCOSYLATED A1C: CPT

## 2022-06-27 PROCEDURE — 84443 ASSAY THYROID STIM HORMONE: CPT

## 2022-06-27 PROCEDURE — 36415 COLL VENOUS BLD VENIPUNCTURE: CPT

## 2022-06-27 PROCEDURE — 80061 LIPID PANEL: CPT

## 2022-06-27 PROCEDURE — 80053 COMPREHEN METABOLIC PANEL: CPT

## 2022-06-27 PROCEDURE — 81001 URINALYSIS AUTO W/SCOPE: CPT | Performed by: INTERNAL MEDICINE

## 2022-06-27 PROCEDURE — 85025 COMPLETE CBC W/AUTO DIFF WBC: CPT

## 2022-06-30 ENCOUNTER — OFFICE VISIT (OUTPATIENT)
Dept: INTERNAL MEDICINE CLINIC | Facility: OTHER | Age: 47
End: 2022-06-30
Payer: COMMERCIAL

## 2022-06-30 VITALS
HEIGHT: 65 IN | WEIGHT: 218 LBS | DIASTOLIC BLOOD PRESSURE: 80 MMHG | HEART RATE: 82 BPM | OXYGEN SATURATION: 97 % | BODY MASS INDEX: 36.32 KG/M2 | SYSTOLIC BLOOD PRESSURE: 124 MMHG | TEMPERATURE: 98.2 F

## 2022-06-30 DIAGNOSIS — E78.00 HYPERCHOLESTEROLEMIA: ICD-10-CM

## 2022-06-30 DIAGNOSIS — R73.03 PREDIABETES: Primary | ICD-10-CM

## 2022-06-30 DIAGNOSIS — F33.9 DEPRESSION, RECURRENT (HCC): ICD-10-CM

## 2022-06-30 DIAGNOSIS — Z11.59 ENCOUNTER FOR HEPATITIS C SCREENING TEST FOR LOW RISK PATIENT: ICD-10-CM

## 2022-06-30 PROCEDURE — 3008F BODY MASS INDEX DOCD: CPT | Performed by: STUDENT IN AN ORGANIZED HEALTH CARE EDUCATION/TRAINING PROGRAM

## 2022-06-30 PROCEDURE — 99214 OFFICE O/P EST MOD 30 MIN: CPT | Performed by: INTERNAL MEDICINE

## 2022-06-30 RX ORDER — METFORMIN HYDROCHLORIDE 500 MG/1
500 TABLET, EXTENDED RELEASE ORAL
Qty: 90 TABLET | Refills: 1 | Status: SHIPPED | OUTPATIENT
Start: 2022-06-30

## 2022-06-30 NOTE — PROGRESS NOTES
Assessment/Plan:     Diagnoses and all orders for this visit:    Prediabetes  -     metFORMIN (GLUCOPHAGE-XR) 500 mg 24 hr tablet; Take 1 tablet (500 mg total) by mouth daily with dinner  -     Glucose, fasting; Future  -     Hemoglobin A1C; Future    Encounter for hepatitis C screening test for low risk patient  -     Hepatitis C antibody; Future    Depression, recurrent ([facility] [address] )   Doing well continue with the Lexapro  Hypercholesterolemia  -     Lipid panel; Future        BMI Counseling: Body mass index is 36 28 kg/m²  The BMI is above normal  Nutrition recommendations include decreasing portion sizes, encouraging healthy choices of fruits and vegetables and moderation in carbohydrate intake  Exercise recommendations include moderate physical activity 150 minutes/week  Rationale for BMI follow-up plan is due to patient being overweight or obese  M*Modal software was used to dictate this note  It may contain errors with dictating incorrect words or incorrect spelling  Please contact the provider directly with any questions  Subjective:   Chief Complaint   Patient presents with    Follow-up     3 month f/u  labs on 6/27          BMI, Colonoscopy- pt refused         Patient ID: Tyler Duron is a 55 y o  female      HPI  Very pleasant 55 years young lady who is here today for the regular follow-up hypertension is very well controlled prediabetic discussed in detail with the patient regarding the diet exercise and weight loss and discussion about the metformin 500 mg once a day for prevention of diabetes and also for the control of triglyceridemia she agreed to that and we will start the medication and follow-up in 3-4 months for further evaluation of the lipid panel hemoglobin A1c also  Discussed about the diet and exercise to continue with that hypercholesterolemia continue with the rosuvastatin  Anxiety and depression continue with the Lexapro as before he stopped taking pregabalin  The following portions of the patient's history were reviewed and updated as appropriate: allergies, current medications, past family history, past medical history, past social history, past surgical history and problem list     Review of Systems   Constitutional: Negative for chills, fatigue and fever  HENT: Negative for congestion, ear pain, hearing loss, postnasal drip, sinus pressure, sore throat and voice change  Eyes: Negative for pain, discharge and visual disturbance  Respiratory: Negative for cough, chest tightness and shortness of breath  Cardiovascular: Negative for chest pain, palpitations and leg swelling  Gastrointestinal: Negative for abdominal pain, blood in stool, diarrhea, nausea, rectal pain and vomiting  Genitourinary: Negative for difficulty urinating, dysuria, hematuria and urgency  Musculoskeletal: Negative for arthralgias, back pain and joint swelling  Skin: Negative for color change and rash  Allergic/Immunologic: Negative for environmental allergies and food allergies  Neurological: Negative for dizziness, tremors, seizures, syncope, weakness, numbness and headaches  Hematological: Negative for adenopathy  Psychiatric/Behavioral: Negative for behavioral problems and hallucinations  All other systems reviewed and are negative          Past Medical History:   Diagnosis Date    Back disorder     Depression     PONV (postoperative nausea and vomiting)          Current Outpatient Medications:     escitalopram (LEXAPRO) 10 mg tablet, Take 1 tablet (10 mg total) by mouth daily at bedtime, Disp: 90 tablet, Rfl: 1    metFORMIN (GLUCOPHAGE-XR) 500 mg 24 hr tablet, Take 1 tablet (500 mg total) by mouth daily with dinner, Disp: 90 tablet, Rfl: 1    multivitamin-minerals (CENTRUM ADULTS) tablet, Take 1 tablet by mouth if needed, Disp: , Rfl:     rosuvastatin (CRESTOR) 10 MG tablet, Take 1 tablet (10 mg total) by mouth daily, Disp: 90 tablet, Rfl: 1    Allergies   Allergen Reactions  Amoxicillin Rash       Social History   Past Surgical History:   Procedure Laterality Date    BACK SURGERY      Lumbar( L3)     SECTION  8080,7877    X2    CHOLECYSTECTOMY      ENDOMETRIAL BIOPSY      EXAMINATION UNDER ANESTHESIA N/A 5/15/2020    Procedure: EXAM UNDER ANESTHESIA (EUA); Surgeon: Lennart Ahumada, MD;  [facility];  Service: Gynecology    INDUCED       times 2    OH HYSTEROSCOPY,W/ENDO BX N/A 5/15/2020    Procedure: DILATATION AND CURETTAGE (D&C) WITH HYSTEROSCOPY;  Surgeon: Lennart Ahumada, MD;  [facility];  Service: Gynecology    OH INSERT INTRAUTERINE DEVICE N/A 5/15/2020    Procedure: Mirena insertion;  Surgeon: Lennart Ahumada, MD;  [facility];  Service: Gynecology    OH LAPAROSCOPY W TOT HYSTERECTUTERUS <=250 GRAM  W TUBE/OVARY N/A 2021    Procedure: ROBOTIC ASSISTED LAPAROSCOPIC HYSTERECTOMY BILATERAL SALPINGECTOMY EUA, REMOVAL OF IUD, LYSIS OF ADHESIONS, CYSTOSCOPY;  Surgeon: Lennart Ahumada, MD;  [facility];  Service: Gynecology    TUBAL LIGATION      WISDOM TOOTH EXTRACTION  10/05/2020     Family History   Problem Relation Age of Onset    Diabetes Mother     Hypertension Mother     Sudden death Mother     Anxiety disorder Family         symptom    Other Family         breast disorders    Heart disease Other         cardiac disorder    Heart disease Other         cardiac disorder    Depression Father     Depression Sister     Colon cancer Neg Hx     Ovarian cancer Neg Hx     Cervical cancer Neg Hx     Uterine cancer Neg Hx        Objective:  /80 (BP Location: Left arm, Patient Position: Sitting, Cuff Size: Adult)   Pulse 82   Temp 98 2 °F (36 8 °C) (Temporal)   Ht 5' 5" (1 651 m)   Wt 98 9 kg (218 lb)   LMP 05/15/2020   SpO2 97%   BMI 36 28 kg/m²        Physical Exam  Constitutional:       Appearance: She is well-developed  She is obese     HENT:      Right Ear: External ear normal  Eyes:      Conjunctiva/sclera: Conjunctivae normal       Pupils: Pupils are equal, round, and reactive to light  Neck:      Thyroid: No thyromegaly  Vascular: No JVD  Cardiovascular:      Rate and Rhythm: Normal rate and regular rhythm  Heart sounds: Normal heart sounds  Pulmonary:      Breath sounds: Normal breath sounds  Abdominal:      General: Bowel sounds are normal       Palpations: Abdomen is soft  Musculoskeletal:         General: Normal range of motion  Cervical back: Normal range of motion  Lymphadenopathy:      Cervical: No cervical adenopathy  Skin:     General: Skin is dry  Neurological:      Mental Status: She is alert and oriented to person, place, and time  Deep Tendon Reflexes: Reflexes are normal and symmetric     Psychiatric:         Behavior: Behavior normal

## 2022-09-29 NOTE — PATIENT INSTRUCTIONS
Please take your antibiotic cefdinir 300 mg twice daily for 5 days  Please use salt water rinses for sore throat  Please call us back if you do not feel better after this antibiotic course

## 2022-11-10 ENCOUNTER — APPOINTMENT (OUTPATIENT)
Dept: LAB | Facility: IMAGING CENTER | Age: 47
End: 2022-11-10

## 2022-11-10 DIAGNOSIS — R73.03 PREDIABETES: ICD-10-CM

## 2022-11-10 DIAGNOSIS — Z11.59 ENCOUNTER FOR HEPATITIS C SCREENING TEST FOR LOW RISK PATIENT: ICD-10-CM

## 2022-11-10 LAB
EST. AVERAGE GLUCOSE BLD GHB EST-MCNC: 126 MG/DL
GLUCOSE P FAST SERPL-MCNC: 113 MG/DL (ref 65–99)
HBA1C MFR BLD: 6 %
HCV AB SER QL: NORMAL

## 2022-11-14 ENCOUNTER — OFFICE VISIT (OUTPATIENT)
Dept: INTERNAL MEDICINE CLINIC | Facility: OTHER | Age: 47
End: 2022-11-14

## 2022-11-14 VITALS
SYSTOLIC BLOOD PRESSURE: 128 MMHG | RESPIRATION RATE: 18 BRPM | HEART RATE: 80 BPM | HEIGHT: 65 IN | DIASTOLIC BLOOD PRESSURE: 82 MMHG | OXYGEN SATURATION: 98 % | BODY MASS INDEX: 36.42 KG/M2 | TEMPERATURE: 98.7 F | WEIGHT: 218.6 LBS

## 2022-11-14 DIAGNOSIS — R73.03 PREDIABETES: Primary | ICD-10-CM

## 2022-11-14 DIAGNOSIS — E78.00 HYPERCHOLESTEROLEMIA: ICD-10-CM

## 2022-11-14 DIAGNOSIS — F33.9 DEPRESSION, RECURRENT (HCC): ICD-10-CM

## 2022-11-14 DIAGNOSIS — Z12.11 SCREENING FOR COLON CANCER: ICD-10-CM

## 2022-11-14 DIAGNOSIS — Z12.31 ENCOUNTER FOR SCREENING MAMMOGRAM FOR MALIGNANT NEOPLASM OF BREAST: ICD-10-CM

## 2022-11-14 PROBLEM — G89.4 CHRONIC PAIN SYNDROME: Status: RESOLVED | Noted: 2021-06-30 | Resolved: 2022-11-14

## 2022-11-14 PROBLEM — Z01.419 ENCOUNTER FOR GYNECOLOGICAL EXAMINATION WITHOUT ABNORMAL FINDING: Status: RESOLVED | Noted: 2021-11-05 | Resolved: 2022-11-14

## 2022-11-14 RX ORDER — METFORMIN HYDROCHLORIDE 500 MG/1
500 TABLET, EXTENDED RELEASE ORAL
Qty: 90 TABLET | Refills: 1 | Status: SHIPPED | OUTPATIENT
Start: 2022-11-14

## 2022-11-14 RX ORDER — ROSUVASTATIN CALCIUM 10 MG/1
10 TABLET, COATED ORAL DAILY
Qty: 90 TABLET | Refills: 1 | Status: SHIPPED | OUTPATIENT
Start: 2022-11-14

## 2022-11-14 NOTE — PROGRESS NOTES
ADULT ANNUAL [address] [facility] [place]    NAME: Jeb Kessler  AGE: 52 y o  SEX: female  : 1975     DATE: 2022     Assessment and Plan:     Problem List Items Addressed This Visit        Other    BMI 33 0-33 9,adult    Hypercholesterolemia    Relevant Medications    rosuvastatin (CRESTOR) 10 MG tablet    Other Relevant Orders    CBC and differential    Comprehensive metabolic panel    TSH, 3rd generation with Free T4 reflex    UA w Reflex to Microscopic w Reflex to Culture    Depression, recurrent (HCC)    Relevant Orders    Hemoglobin A1C    Prediabetes - Primary    Relevant Medications    metFORMIN (GLUCOPHAGE-XR) 500 mg 24 hr tablet    Other Relevant Orders    Lipid panel      Other Visit Diagnoses     Screening for colon cancer        Relevant Orders    Cologuard    Encounter for screening mammogram for malignant neoplasm of breast        Relevant Orders    Mammo screening bilateral w 3d & cad          Immunizations and preventive care screenings were discussed with patient today  Appropriate education was printed on patient's after visit summary  Counseling:  Alcohol/drug use: discussed moderation in alcohol intake, the recommendations for healthy alcohol use, and avoidance of illicit drug use  Dental Health: discussed importance of regular tooth brushing, flossing, and dental visits  Injury prevention: discussed safety/seat belts, safety helmets, smoke detectors, carbon dioxide detectors, and smoking near bedding or upholstery  Sexual health: discussed sexually transmitted diseases, partner selection, use of condoms, avoidance of unintended pregnancy, and contraceptive alternatives  · Exercise: the importance of regular exercise/physical activity was discussed  Recommend exercise 3-5 times per week for at least 30 minutes  Return in about 6 months (around 2023)       Chief Complaint:     Chief Complaint   Patient presents with   • Follow-up     4 month, labs 11/10/22  No issues   • hm     Mammo due after 12/3/22, cologuard, annual exam,       History of Present Illness:     Adult Annual Physical   Patient here for a comprehensive physical exam  The patient reports no problems  Diet and Physical Activity  · Diet/Nutrition: well balanced diet  · Exercise: no formal exercise  Depression Screening  PHQ-2/9 Depression Screening         General Health  · Sleep: gets 7-8 hours of sleep on average  · Hearing: normal - bilateral   · Vision: no vision problems  · Dental: regular dental visits  /GYN Health  · Patient is:  Premenopausal  · Last menstrual period:  Status post hysterectomy  · Contraceptive method: None  Review of Systems:     Review of Systems   Constitutional: Negative for chills and fatigue  HENT: Negative for congestion, ear pain, hearing loss, postnasal drip, sinus pressure, sore throat and voice change  Eyes: Negative for pain, discharge and visual disturbance  Respiratory: Negative for cough, chest tightness and shortness of breath  Cardiovascular: Negative for chest pain, palpitations and leg swelling  Gastrointestinal: Negative for abdominal pain, blood in stool, diarrhea, nausea and rectal pain  Genitourinary: Negative for difficulty urinating, dysuria and urgency  Musculoskeletal: Negative for arthralgias and joint swelling  Skin: Negative for rash  Allergic/Immunologic: Negative for environmental allergies and food allergies  Neurological: Negative for dizziness, tremors, weakness, numbness and headaches  Hematological: Negative for adenopathy  Psychiatric/Behavioral: Negative for behavioral problems and hallucinations        Past Medical History:     Past Medical History:   Diagnosis Date   • Back disorder    • Depression    • PONV (postoperative nausea and vomiting)       Past Surgical History:     Past Surgical History:   Procedure Laterality Date   • BACK SURGERY      Lumbar( L3)   •  SECTION  3920,4010    X2   • CHOLECYSTECTOMY     • ENDOMETRIAL BIOPSY     • EXAMINATION UNDER ANESTHESIA N/A 5/15/2020    Procedure: EXAM UNDER ANESTHESIA (EUA);   Surgeon: Andres Casey MD;  [facility];  Service: Gynecology   • INDUCED       times 2   • [place] HYSTEROSCOPY,W/ENDO BX N/A 5/15/2020    Procedure: DILATATION AND CURETTAGE (D&C) WITH HYSTEROSCOPY;  Surgeon: Andres Casey MD;  [facility];  Service: Gynecology   • WY INSERT INTRAUTERINE DEVICE N/A 5/15/2020    Procedure: Mirena insertion;  Surgeon: Andres Casey MD;  [facility];  Service: Gynecology   • WY LAPAROSCOPY W TOT HYSTERECTUTERUS <=250 GRAM  W TUBE/OVARY N/A 2021    Procedure: ROBOTIC ASSISTED LAPAROSCOPIC HYSTERECTOMY BILATERAL SALPINGECTOMY EUA, REMOVAL OF IUD, LYSIS OF Lockie Comber;  Surgeon: Andres Casey MD;  [facility];  Service: Gynecology   • TUBAL LIGATION     • WISDOM TOOTH EXTRACTION  10/05/2020      Social History:     Social History     Socioeconomic History   • Marital status: /Civil Union     Spouse name: None   • Number of children: None   • Years of education: None   • Highest education level: None   Occupational History   • Occupation: currently works full-time   Tobacco Use   • Smoking status: Never Smoker   • Smokeless tobacco: Never Used   Vaping Use   • Vaping Use: Never used   Substance and Sexual Activity   • Alcohol use: Yes     Comment: socially   • Drug use: No   • Sexual activity: Yes     Partners: Male     Birth control/protection: Surgical, Female Sterilization     Comment: Tubal ligations   Other Topics Concern   • None   Social History Narrative    Always uses seat belt    Caffeine use    Drinks coffee    Exercise habits    Feels safe at home    Lives with spouse         Social Determinants of Health     Financial Resource Strain: Not on file   Food Insecurity: Not on file   Transportation Needs: Not on file   Physical Activity: Not on file   Stress: Not on file   Social Connections: Not on file   Intimate Partner Violence: Not on file   Housing Stability: Not on file      Family History:     Family History   Problem Relation Age of Onset   • Diabetes Mother    • Hypertension Mother    • Sudden death Mother    • Anxiety disorder Family         symptom   • Other Family         breast disorders   • Heart disease Other         cardiac disorder   • Heart disease Other         cardiac disorder   • Depression Father    • Depression Sister    • Colon cancer Neg Hx    • Ovarian cancer Neg Hx    • Cervical cancer Neg Hx    • Uterine cancer Neg Hx       Current Medications:     Current Outpatient Medications   Medication Sig Dispense Refill   • escitalopram (LEXAPRO) 10 mg tablet Take 1 tablet (10 mg total) by mouth daily at bedtime 90 tablet 1   • metFORMIN (GLUCOPHAGE-XR) 500 mg 24 hr tablet Take 1 tablet (500 mg total) by mouth daily with dinner 90 tablet 1   • multivitamin-minerals (CENTRUM ADULTS) tablet Take 1 tablet by mouth if needed     • rosuvastatin (CRESTOR) 10 MG tablet Take 1 tablet (10 mg total) by mouth daily 90 tablet 1     No current facility-administered medications for this visit  Allergies: Allergies   Allergen Reactions   • Amoxicillin Rash      Physical Exam:     /82 (BP Location: Left arm, Patient Position: Sitting, Cuff Size: Large)   Pulse 80   Temp 98 7 °F (37 1 °C) (Temporal)   Resp 18   Ht 5' 5" (1 651 m)   Wt 99 2 kg (218 lb 9 6 oz)   LMP 05/15/2020   SpO2 98%   BMI 36 38 kg/m²     Physical Exam  HENT:      Head: Normocephalic  Eyes:      Pupils: Pupils are equal, round, and reactive to light  Cardiovascular:      Rate and Rhythm: Normal rate and regular rhythm  Heart sounds: Murmur heard  Pulmonary:      Breath sounds: Normal breath sounds  Abdominal:      General: Bowel sounds are normal       Palpations: Abdomen is soft  Musculoskeletal:      Cervical back: Normal range of motion  Skin:     General: Skin is warm  Neurological:      Mental Status: She is alert and oriented to person, place, and time  Psychiatric:         Behavior: Behavior normal          Thought Content:  Thought content normal           MD Soni Nelson [address] [facility]

## 2022-11-14 NOTE — PROGRESS NOTES
Assessment/Plan:     Diagnoses and all orders for this visit:    Prediabetes  -     metFORMIN (GLUCOPHAGE-XR) 500 mg 24 hr tablet; Take 1 tablet (500 mg total) by mouth daily with dinner  -     Lipid panel; Future    Screening for colon cancer  -     Cologuard    Encounter for screening mammogram for malignant neoplasm of breast  -     Mammo screening bilateral w 3d & cad; Future    Hypercholesterolemia  -     rosuvastatin (CRESTOR) 10 MG tablet; Take 1 tablet (10 mg total) by mouth daily  -     CBC and differential; Future  -     Comprehensive metabolic panel; Future  -     TSH, 3rd generation with Free T4 reflex; Future  -     UA w Reflex to Microscopic w Reflex to Culture    Depression, recurrent (HCC)  -     Hemoglobin A1C; Future    BMI 33 0-33 9,adult             M*Modal software was used to dictate this note  It may contain errors with dictating incorrect words or incorrect spelling  Please contact the provider directly with any questions  Subjective:   Chief Complaint   Patient presents with   • Follow-up     4 month, labs 11/10/22  No issues   • hm     Mammo due after 12/3/22, cologuard, annual exam,         Patient ID: Ira Omalley is a 52 y o  female  HPI    The following portions of the patient's history were reviewed and updated as appropriate: allergies, current medications, past family history, past medical history, past social history, past surgical history and problem list     Review of Systems   Constitutional: Negative for chills and fatigue  HENT: Negative for congestion, ear pain, hearing loss, postnasal drip, sinus pressure, sore throat and voice change  Eyes: Negative for pain, discharge and visual disturbance  Respiratory: Negative for cough, chest tightness and shortness of breath  Cardiovascular: Negative for chest pain, palpitations and leg swelling  Gastrointestinal: Negative for abdominal pain, blood in stool, diarrhea, nausea and rectal pain     Genitourinary: Negative for difficulty urinating, dysuria and urgency  Musculoskeletal: Negative for arthralgias and joint swelling  Skin: Negative for rash  Allergic/Immunologic: Negative for environmental allergies and food allergies  Neurological: Negative for dizziness, tremors, weakness, numbness and headaches  Hematological: Negative for adenopathy  Psychiatric/Behavioral: Negative for behavioral problems and hallucinations  Past Medical History:   Diagnosis Date   • Back disorder    • Depression    • PONV (postoperative nausea and vomiting)          Current Outpatient Medications:   •  escitalopram (LEXAPRO) 10 mg tablet, Take 1 tablet (10 mg total) by mouth daily at bedtime, Disp: 90 tablet, Rfl: 1  •  metFORMIN (GLUCOPHAGE-XR) 500 mg 24 hr tablet, Take 1 tablet (500 mg total) by mouth daily with dinner, Disp: 90 tablet, Rfl: 1  •  multivitamin-minerals (CENTRUM ADULTS) tablet, Take 1 tablet by mouth if needed, Disp: , Rfl:   •  rosuvastatin (CRESTOR) 10 MG tablet, Take 1 tablet (10 mg total) by mouth daily, Disp: 90 tablet, Rfl: 1    Allergies   Allergen Reactions   • Amoxicillin Rash       Social History   Past Surgical History:   Procedure Laterality Date   • BACK SURGERY      Lumbar( L3)   •  SECTION  ,2008    X2   • CHOLECYSTECTOMY     • ENDOMETRIAL BIOPSY     • EXAMINATION UNDER ANESTHESIA N/A 5/15/2020    Procedure: EXAM UNDER ANESTHESIA (EUA);   Surgeon: Nay Arriola MD;  [facility];  Service: Gynecology   • INDUCED       times 2   • TX HYSTEROSCOPY,W/ENDO BX N/A 5/15/2020    Procedure: DILATATION AND CURETTAGE (D&C) WITH HYSTEROSCOPY;  Surgeon: Nya Arriola MD;  [facility];  Service: Gynecology   • TX INSERT INTRAUTERINE DEVICE N/A 5/15/2020    Procedure: Mirena insertion;  Surgeon: Nya Arriola MD;  [facility];  Service: Gynecology   • TX LAPAROSCOPY W TOT HYSTERECTUTERUS <=250 GRAM  W TUBE/OVARY N/A 2021    Procedure: ROBOTIC ASSISTED LAPAROSCOPIC HYSTERECTOMY BILATERAL SALPINGECTOMY EUA, REMOVAL OF IUD, LYSIS OF ADHESIONS, CYSTOSCOPY;  Surgeon: Amy Arnold MD;  [facility];  Service: Gynecology   • TUBAL LIGATION     • WISDOM TOOTH EXTRACTION  10/05/2020     Family History   Problem Relation Age of Onset   • Diabetes Mother    • Hypertension Mother    • Sudden death Mother    • Anxiety disorder Family         symptom   • Other Family         breast disorders   • Heart disease Other         cardiac disorder   • Heart disease Other         cardiac disorder   • Depression Father    • Depression Sister    • Colon cancer Neg Hx    • Ovarian cancer Neg Hx    • Cervical cancer Neg Hx    • Uterine cancer Neg Hx        Objective:  /82 (BP Location: Left arm, Patient Position: Sitting, Cuff Size: Large)   Pulse 80   Temp 98 7 °F (37 1 °C) (Temporal)   Resp 18   Ht 5' 5" (1 651 m)   Wt 99 2 kg (218 lb 9 6 oz)   LMP 05/15/2020   SpO2 98%   BMI 36 38 kg/m²        Physical Exam  Constitutional:       Appearance: She is well-developed  HENT:      Right Ear: External ear normal    Eyes:      Conjunctiva/sclera: Conjunctivae normal       Pupils: Pupils are equal, round, and reactive to light  Neck:      Thyroid: No thyromegaly  Vascular: No JVD  Cardiovascular:      Rate and Rhythm: Normal rate and regular rhythm  Heart sounds: Normal heart sounds  Pulmonary:      Breath sounds: Normal breath sounds  Abdominal:      General: Bowel sounds are normal       Palpations: Abdomen is soft  Musculoskeletal:         General: Normal range of motion  Cervical back: Normal range of motion  Lymphadenopathy:      Cervical: No cervical adenopathy  Skin:     General: Skin is dry  Neurological:      Mental Status: She is alert and oriented to person, place, and time  Deep Tendon Reflexes: Reflexes are normal and symmetric     Psychiatric:         Behavior: Behavior normal

## 2022-11-21 DIAGNOSIS — L65.9 HAIR LOSS: ICD-10-CM

## 2022-11-21 DIAGNOSIS — F32.A ANXIETY AND DEPRESSION: ICD-10-CM

## 2022-11-21 DIAGNOSIS — E78.00 HYPERCHOLESTEROLEMIA: ICD-10-CM

## 2022-11-21 DIAGNOSIS — R73.03 PREDIABETES: ICD-10-CM

## 2022-11-21 DIAGNOSIS — F41.9 ANXIETY AND DEPRESSION: ICD-10-CM

## 2022-11-21 RX ORDER — METFORMIN HYDROCHLORIDE 500 MG/1
500 TABLET, EXTENDED RELEASE ORAL
Qty: 90 TABLET | Refills: 0 | Status: CANCELLED | OUTPATIENT
Start: 2022-11-21

## 2022-11-21 RX ORDER — ESCITALOPRAM OXALATE 10 MG/1
10 TABLET ORAL
Qty: 90 TABLET | Refills: 0 | Status: SHIPPED | OUTPATIENT
Start: 2022-11-21

## 2022-11-21 RX ORDER — ROSUVASTATIN CALCIUM 10 MG/1
10 TABLET, COATED ORAL DAILY
Qty: 90 TABLET | Refills: 0 | Status: CANCELLED | OUTPATIENT
Start: 2022-11-21

## 2023-01-17 DIAGNOSIS — Z12.31 ENCOUNTER FOR SCREENING MAMMOGRAM FOR MALIGNANT NEOPLASM OF BREAST: ICD-10-CM

## 2023-01-19 ENCOUNTER — ANNUAL EXAM (OUTPATIENT)
Dept: OBGYN CLINIC | Facility: CLINIC | Age: 48
End: 2023-01-19

## 2023-01-19 VITALS
DIASTOLIC BLOOD PRESSURE: 72 MMHG | SYSTOLIC BLOOD PRESSURE: 110 MMHG | WEIGHT: 223.6 LBS | BODY MASS INDEX: 37.25 KG/M2 | HEIGHT: 65 IN

## 2023-01-19 DIAGNOSIS — Z01.419 ENCOUNTER FOR ANNUAL ROUTINE GYNECOLOGICAL EXAMINATION: Primary | ICD-10-CM

## 2023-01-19 DIAGNOSIS — Z12.31 ENCOUNTER FOR SCREENING MAMMOGRAM FOR MALIGNANT NEOPLASM OF BREAST: ICD-10-CM

## 2023-01-19 NOTE — ASSESSMENT & PLAN NOTE
53 yo here for annual exam  She is s/p hyst    Pap 11/21 NILM HPV neg  History of CKC 2005  Will complete 25 years post cone, repeat every 3 years  Mammo up to date   Recommend breast self awareness  Planning cologuard  Recommend healthy diet and exercise  Sexually active with some dryness, managing well

## 2023-01-19 NOTE — PROGRESS NOTES
Assessment/Plan:    Encounter for annual routine gynecological examination  51 yo here for annual exam  She is s/p hyst    Pap 11/21 NILM HPV neg  History of CKC 2005  Will complete 25 years post cone, repeat every 3 years  Mammo up to date  Recommend breast self awareness  Planning cologuard  Recommend healthy diet and exercise  Sexually active with some dryness, managing well       Diagnoses and all orders for this visit:    Encounter for annual routine gynecological examination    Encounter for screening mammogram for malignant neoplasm of breast  -     Mammo screening bilateral w 3d & cad; Future          Subjective:      Patient ID: Piter Smyth is a 52 y o  female  51 yo here for annual  No bleeding  No vulvar or breast concerns  No pelvic pain  Sexually active with some dryness, nice response with lubrication  Here with her daughter Virginia, also my patient  The following portions of the patient's history were reviewed and updated as appropriate: allergies, current medications, past family history, past medical history, past social history, past surgical history and problem list     Review of Systems   Constitutional: Negative for chills and fever  HENT: Negative for ear pain and sore throat  Eyes: Negative for pain and visual disturbance  Respiratory: Negative for cough and shortness of breath  Cardiovascular: Negative for chest pain and palpitations  Gastrointestinal: Negative for abdominal pain, constipation, diarrhea, nausea and vomiting  Genitourinary: Negative for dyspareunia, dysuria, frequency, hematuria, pelvic pain, urgency, vaginal bleeding, vaginal discharge and vaginal pain  Musculoskeletal: Negative for arthralgias and back pain  Skin: Negative for color change and rash  Neurological: Negative for seizures and syncope  All other systems reviewed and are negative          Objective:      /72 (BP Location: Left arm, Patient Position: Sitting, Cuff Size: Large) Ht 5' 5" (1 651 m)   Wt 101 kg (223 lb 9 6 oz)   LMP 05/15/2020   BMI 37 21 kg/m²          Physical Exam  Constitutional:       General: She is not in acute distress  Appearance: She is well-developed  She is not diaphoretic  HENT:      Head: Normocephalic and atraumatic  Neck:      Thyroid: No thyromegaly  Pulmonary:      Effort: Pulmonary effort is normal    Chest:   Breasts:     Breasts are symmetrical       Right: No inverted nipple, mass, nipple discharge, skin change or tenderness  Left: No inverted nipple, mass, nipple discharge, skin change or tenderness  Abdominal:      General: There is no distension  Palpations: Abdomen is soft  There is no mass  Tenderness: There is no abdominal tenderness  There is no guarding or rebound  Genitourinary:     Exam position: Supine  Labia:         Right: No rash, tenderness, lesion or injury  Left: No rash, tenderness, lesion or injury  Vagina: Normal  No vaginal discharge, erythema, tenderness or bleeding  Uterus: Absent  Adnexa:         Right: No mass, tenderness or fullness  Left: No mass, tenderness or fullness  Comments: Uterus and cervix surgically absent  Musculoskeletal:      Cervical back: Normal range of motion and neck supple  Lymphadenopathy:      Cervical: No cervical adenopathy  Upper Body:      Right upper body: No supraclavicular, axillary or pectoral adenopathy  Left upper body: No supraclavicular, axillary or pectoral adenopathy

## 2023-02-08 LAB — COLOGUARD RESULT REPORTABLE: NEGATIVE

## 2023-02-23 DIAGNOSIS — F41.9 ANXIETY AND DEPRESSION: ICD-10-CM

## 2023-02-23 DIAGNOSIS — L65.9 HAIR LOSS: ICD-10-CM

## 2023-02-23 DIAGNOSIS — F32.A ANXIETY AND DEPRESSION: ICD-10-CM

## 2023-02-23 RX ORDER — ESCITALOPRAM OXALATE 10 MG/1
10 TABLET ORAL
Qty: 90 TABLET | Refills: 0 | Status: SHIPPED | OUTPATIENT
Start: 2023-02-23

## 2023-05-01 ENCOUNTER — APPOINTMENT (OUTPATIENT)
Dept: LAB | Facility: IMAGING CENTER | Age: 48
End: 2023-05-01

## 2023-05-01 DIAGNOSIS — E78.00 HYPERCHOLESTEROLEMIA: ICD-10-CM

## 2023-05-01 LAB
BACTERIA UR QL AUTO: ABNORMAL /HPF
BASOPHILS # BLD AUTO: 0.05 THOUSANDS/ΜL (ref 0–0.1)
BASOPHILS NFR BLD AUTO: 1 % (ref 0–1)
BILIRUB UR QL STRIP: NEGATIVE
CLARITY UR: CLEAR
COLOR UR: ABNORMAL
EOSINOPHIL # BLD AUTO: 0.11 THOUSAND/ΜL (ref 0–0.61)
EOSINOPHIL NFR BLD AUTO: 2 % (ref 0–6)
ERYTHROCYTE [DISTWIDTH] IN BLOOD BY AUTOMATED COUNT: 12.6 % (ref 11.6–15.1)
GLUCOSE UR STRIP-MCNC: NEGATIVE MG/DL
HCT VFR BLD AUTO: 44.1 % (ref 34.8–46.1)
HGB BLD-MCNC: 13.7 G/DL (ref 11.5–15.4)
HGB UR QL STRIP.AUTO: NEGATIVE
IMM GRANULOCYTES # BLD AUTO: 0.01 THOUSAND/UL (ref 0–0.2)
IMM GRANULOCYTES NFR BLD AUTO: 0 % (ref 0–2)
KETONES UR STRIP-MCNC: NEGATIVE MG/DL
LEUKOCYTE ESTERASE UR QL STRIP: NEGATIVE
LYMPHOCYTES # BLD AUTO: 3.21 THOUSANDS/ΜL (ref 0.6–4.47)
LYMPHOCYTES NFR BLD AUTO: 46 % (ref 14–44)
MCH RBC QN AUTO: 29.1 PG (ref 26.8–34.3)
MCHC RBC AUTO-ENTMCNC: 31.1 G/DL (ref 31.4–37.4)
MCV RBC AUTO: 94 FL (ref 82–98)
MONOCYTES # BLD AUTO: 0.33 THOUSAND/ΜL (ref 0.17–1.22)
MONOCYTES NFR BLD AUTO: 5 % (ref 4–12)
MUCOUS THREADS UR QL AUTO: ABNORMAL
NEUTROPHILS # BLD AUTO: 3.23 THOUSANDS/ΜL (ref 1.85–7.62)
NEUTS SEG NFR BLD AUTO: 46 % (ref 43–75)
NITRITE UR QL STRIP: NEGATIVE
NON-SQ EPI CELLS URNS QL MICRO: ABNORMAL /HPF
NRBC BLD AUTO-RTO: 0 /100 WBCS
PH UR STRIP.AUTO: 6 [PH]
PLATELET # BLD AUTO: 196 THOUSANDS/UL (ref 149–390)
PMV BLD AUTO: 10 FL (ref 8.9–12.7)
PROT UR STRIP-MCNC: ABNORMAL MG/DL
RBC # BLD AUTO: 4.7 MILLION/UL (ref 3.81–5.12)
RBC #/AREA URNS AUTO: ABNORMAL /HPF
SP GR UR STRIP.AUTO: 1.02 (ref 1–1.03)
TSH SERPL DL<=0.05 MIU/L-ACNC: 1.89 UIU/ML (ref 0.45–4.5)
UROBILINOGEN UR STRIP-ACNC: <2 MG/DL
WBC # BLD AUTO: 6.94 THOUSAND/UL (ref 4.31–10.16)
WBC #/AREA URNS AUTO: ABNORMAL /HPF

## 2023-05-04 DIAGNOSIS — E78.00 HYPERCHOLESTEROLEMIA: Primary | ICD-10-CM

## 2023-05-06 ENCOUNTER — APPOINTMENT (OUTPATIENT)
Dept: LAB | Facility: IMAGING CENTER | Age: 48
End: 2023-05-06

## 2023-05-06 DIAGNOSIS — E78.00 HYPERCHOLESTEROLEMIA: ICD-10-CM

## 2023-05-06 LAB
CHOLEST SERPL-MCNC: 214 MG/DL
HDLC SERPL-MCNC: 51 MG/DL
LDLC SERPL CALC-MCNC: 116 MG/DL (ref 0–100)
TRIGL SERPL-MCNC: 234 MG/DL

## 2023-05-08 ENCOUNTER — OFFICE VISIT (OUTPATIENT)
Dept: INTERNAL MEDICINE CLINIC | Facility: OTHER | Age: 48
End: 2023-05-08

## 2023-05-08 VITALS
BODY MASS INDEX: 38.24 KG/M2 | HEART RATE: 81 BPM | TEMPERATURE: 98 F | DIASTOLIC BLOOD PRESSURE: 84 MMHG | OXYGEN SATURATION: 99 % | HEIGHT: 64 IN | SYSTOLIC BLOOD PRESSURE: 128 MMHG | WEIGHT: 224 LBS

## 2023-05-08 DIAGNOSIS — R73.03 PREDIABETES: ICD-10-CM

## 2023-05-08 DIAGNOSIS — L65.9 HAIR LOSS: ICD-10-CM

## 2023-05-08 DIAGNOSIS — F41.9 ANXIETY AND DEPRESSION: ICD-10-CM

## 2023-05-08 DIAGNOSIS — F32.A ANXIETY AND DEPRESSION: ICD-10-CM

## 2023-05-08 DIAGNOSIS — E78.00 HYPERCHOLESTEROLEMIA: ICD-10-CM

## 2023-05-08 PROBLEM — Z01.419 ENCOUNTER FOR ANNUAL ROUTINE GYNECOLOGICAL EXAMINATION: Status: RESOLVED | Noted: 2023-01-19 | Resolved: 2023-05-08

## 2023-05-08 RX ORDER — METFORMIN HYDROCHLORIDE 500 MG/1
500 TABLET, EXTENDED RELEASE ORAL
Qty: 90 TABLET | Refills: 1 | Status: SHIPPED | OUTPATIENT
Start: 2023-05-08

## 2023-05-08 RX ORDER — ESCITALOPRAM OXALATE 10 MG/1
10 TABLET ORAL
Qty: 90 TABLET | Refills: 1 | Status: SHIPPED | OUTPATIENT
Start: 2023-05-08

## 2023-05-08 RX ORDER — ROSUVASTATIN CALCIUM 10 MG/1
10 TABLET, COATED ORAL DAILY
Qty: 90 TABLET | Refills: 1 | Status: SHIPPED | OUTPATIENT
Start: 2023-05-08

## 2023-05-08 NOTE — PROGRESS NOTES
Assessment/Plan:     Diagnoses and all orders for this visit:    Hair loss  -     escitalopram (LEXAPRO) 10 mg tablet; Take 1 tablet (10 mg total) by mouth daily at bedtime    Anxiety and depression  -     escitalopram (LEXAPRO) 10 mg tablet; Take 1 tablet (10 mg total) by mouth daily at bedtime    Prediabetes  -     metFORMIN (GLUCOPHAGE-XR) 500 mg 24 hr tablet; Take 1 tablet (500 mg total) by mouth daily with dinner    Hypercholesterolemia  -     rosuvastatin (CRESTOR) 10 MG tablet; Take 1 tablet (10 mg total) by mouth daily             M*Modal software was used to dictate this note  It may contain errors with dictating incorrect words or incorrect spelling  Please contact the provider directly with any questions  Subjective:   Chief Complaint   Patient presents with   • Follow-up     6 month follow up   Labs done 5/1 and 5/6        Patient ID: Keisha Samuel is a 52 y o  female  HPI  A very pleasant 52 years young lady who is here today for the regular follow-up complaining is stress incontinence when she coughs or when she laughs which is happening occasionally no other problems review of system otherwise unremarkable  Prediabetes I will follow-up with the hemoglobin A1c and fasting blood sugar next time when she comes in her symptoms are not better she is asymptomatic  She is on metformin and she is tolerating the medication very well  Lipid panel could be better but it is much better than before her 10 years cardiovascular risk is only 1 2% so I will just continue with the same doses of the medication until the next office visit when we will check her lipid panel again and we will decide whether to do something about it or not    So discussed about the diet exercise and weight loss she is trying to do it because of the weather she was not able to do much exercises but now she is planning to do more and this spring and summer  The following portions of the patient's history were reviewed and updated as appropriate: allergies, current medications, past family history, past medical history, past social history, past surgical history and problem list     Review of Systems   Constitutional: Negative for chills and fatigue  HENT: Negative for congestion, ear pain, hearing loss, postnasal drip, sinus pressure, sore throat and voice change  Eyes: Negative for pain, discharge and visual disturbance  Respiratory: Negative for cough, chest tightness and shortness of breath  Cardiovascular: Negative for chest pain, palpitations and leg swelling  Gastrointestinal: Negative for abdominal pain, blood in stool, diarrhea, nausea and rectal pain  Genitourinary: Negative for difficulty urinating, dysuria and urgency  Musculoskeletal: Negative for arthralgias and joint swelling  Skin: Negative for rash  Allergic/Immunologic: Negative for environmental allergies and food allergies  Neurological: Negative for dizziness, tremors, weakness, numbness and headaches  Hematological: Negative for adenopathy  Psychiatric/Behavioral: Negative for behavioral problems and hallucinations           Past Medical History:   Diagnosis Date   • Back disorder    • Depression    • PONV (postoperative nausea and vomiting)          Current Outpatient Medications:   •  escitalopram (LEXAPRO) 10 mg tablet, Take 1 tablet (10 mg total) by mouth daily at bedtime, Disp: 90 tablet, Rfl: 1  •  metFORMIN (GLUCOPHAGE-XR) 500 mg 24 hr tablet, Take 1 tablet (500 mg total) by mouth daily with dinner, Disp: 90 tablet, Rfl: 1  •  multivitamin-minerals (CENTRUM ADULTS) tablet, Take 1 tablet by mouth if needed, Disp: , Rfl:   •  rosuvastatin (CRESTOR) 10 MG tablet, Take 1 tablet (10 mg total) by mouth daily, Disp: 90 tablet, Rfl: 1    Allergies   Allergen Reactions   • Amoxicillin Rash       Social History   Past Surgical History:   Procedure Laterality Date   • BACK SURGERY      Lumbar( L3)   •  SECTION  4781,5804    X2   • "CHOLECYSTECTOMY     • ENDOMETRIAL BIOPSY     • EXAMINATION UNDER ANESTHESIA N/A 5/15/2020    Procedure: EXAM UNDER ANESTHESIA (EUA); Surgeon: Osvaldo Zelaya MD;  [facility];  Service: Gynecology   • INDUCED       times 2   • NM HYSTEROSCOPY BX ENDOMETRIUM&/POLYPC W/WO D&C N/A 5/15/2020    Procedure: DILATATION AND CURETTAGE (D&C) WITH HYSTEROSCOPY;  Surgeon: Osvaldo Zelaya MD;  [facility];  Service: Gynecology   • NM INSERTION INTRAUTERINE DEVICE IUD N/A 5/15/2020    Procedure: Mirena insertion;  Surgeon: Osvaldo Zelaya MD;  [facility];  Service: Gynecology   • NM LAPS TOTAL HYSTERECT 250 GM/< W/RMVL TUBE/OVARY N/A 2021    Procedure: ROBOTIC ASSISTED LAPAROSCOPIC HYSTERECTOMY BILATERAL SALPINGECTOMY EUA, REMOVAL OF IUD, LYSIS OF ADHESIONS, CYSTOSCOPY;  Surgeon: Osvaldo Zelaya MD;  [facility];  Service: Gynecology   • TUBAL LIGATION     • WISDOM TOOTH EXTRACTION  10/05/2020     Family History   Problem Relation Age of Onset   • Diabetes Mother    • Hypertension Mother    • Sudden death Mother    • Anxiety disorder Family         symptom   • Other Family         breast disorders   • Heart disease Other         cardiac disorder   • Heart disease Other         cardiac disorder   • Depression Father    • Depression Sister    • Colon cancer Neg Hx    • Ovarian cancer Neg Hx    • Cervical cancer Neg Hx    • Uterine cancer Neg Hx        Objective:  /84 (BP Location: Left arm, Patient Position: Sitting, Cuff Size: Large)   Pulse 81   Temp 98 °F (36 7 °C) (Temporal)   Ht 5' 4\" (1 626 m)   Wt 102 kg (224 lb)   LMP 05/15/2020   SpO2 99%   BMI 38 45 kg/m²        Physical Exam  Constitutional:       Appearance: She is well-developed  HENT:      Right Ear: External ear normal    Eyes:      Conjunctiva/sclera: Conjunctivae normal       Pupils: Pupils are equal, round, and reactive to light  Neck:      Thyroid: No thyromegaly        Vascular: No " JVD    Cardiovascular:      Rate and Rhythm: Normal rate and regular rhythm  Heart sounds: Normal heart sounds  Pulmonary:      Breath sounds: Normal breath sounds  Abdominal:      General: Bowel sounds are normal       Palpations: Abdomen is soft  Musculoskeletal:         General: Normal range of motion  Cervical back: Normal range of motion  Lymphadenopathy:      Cervical: No cervical adenopathy  Skin:     General: Skin is dry  Neurological:      Mental Status: She is alert and oriented to person, place, and time  Deep Tendon Reflexes: Reflexes are normal and symmetric     Psychiatric:         Behavior: Behavior normal

## 2023-11-11 DIAGNOSIS — L65.9 HAIR LOSS: ICD-10-CM

## 2023-11-11 DIAGNOSIS — F41.9 ANXIETY AND DEPRESSION: ICD-10-CM

## 2023-11-11 DIAGNOSIS — E78.00 HYPERCHOLESTEROLEMIA: ICD-10-CM

## 2023-11-11 DIAGNOSIS — R73.03 PREDIABETES: ICD-10-CM

## 2023-11-11 DIAGNOSIS — F32.A ANXIETY AND DEPRESSION: ICD-10-CM

## 2023-11-13 RX ORDER — ESCITALOPRAM OXALATE 10 MG/1
10 TABLET ORAL
Qty: 90 TABLET | Refills: 1 | Status: SHIPPED | OUTPATIENT
Start: 2023-11-13

## 2023-11-13 RX ORDER — METFORMIN HYDROCHLORIDE 500 MG/1
500 TABLET, EXTENDED RELEASE ORAL
Qty: 90 TABLET | Refills: 1 | Status: SHIPPED | OUTPATIENT
Start: 2023-11-13

## 2023-11-13 RX ORDER — ROSUVASTATIN CALCIUM 10 MG/1
10 TABLET, COATED ORAL DAILY
Qty: 90 TABLET | Refills: 1 | Status: SHIPPED | OUTPATIENT
Start: 2023-11-13

## 2023-12-29 ENCOUNTER — OFFICE VISIT (OUTPATIENT)
Dept: URGENT CARE | Facility: MEDICAL CENTER | Age: 48
End: 2023-12-29
Payer: COMMERCIAL

## 2023-12-29 VITALS
HEIGHT: 64 IN | TEMPERATURE: 98.9 F | HEART RATE: 98 BPM | RESPIRATION RATE: 18 BRPM | WEIGHT: 220 LBS | BODY MASS INDEX: 37.56 KG/M2 | OXYGEN SATURATION: 97 %

## 2023-12-29 DIAGNOSIS — J01.00 ACUTE MAXILLARY SINUSITIS, RECURRENCE NOT SPECIFIED: Primary | ICD-10-CM

## 2023-12-29 DIAGNOSIS — J40 BRONCHITIS: ICD-10-CM

## 2023-12-29 DIAGNOSIS — H10.30 ACUTE BACTERIAL CONJUNCTIVITIS, UNSPECIFIED LATERALITY: ICD-10-CM

## 2023-12-29 PROCEDURE — 99213 OFFICE O/P EST LOW 20 MIN: CPT | Performed by: PHYSICIAN ASSISTANT

## 2023-12-29 RX ORDER — AZITHROMYCIN 250 MG/1
TABLET, FILM COATED ORAL
Qty: 6 TABLET | Refills: 0 | Status: SHIPPED | OUTPATIENT
Start: 2023-12-29 | End: 2024-01-02

## 2023-12-29 RX ORDER — BENZONATATE 100 MG/1
100 CAPSULE ORAL 3 TIMES DAILY PRN
Qty: 20 CAPSULE | Refills: 0 | Status: SHIPPED | OUTPATIENT
Start: 2023-12-29

## 2023-12-29 RX ORDER — CIPROFLOXACIN HYDROCHLORIDE 3.5 MG/ML
1 SOLUTION/ DROPS TOPICAL
Qty: 5 ML | Refills: 0 | Status: SHIPPED | OUTPATIENT
Start: 2023-12-29

## 2023-12-29 NOTE — LETTER
December 29, 2023     Patient: Ying Reese   YOB: 1975   Date of Visit: 12/29/2023       To Whom It May Concern:    It is my medical opinion that Ying Reese may return to work on 12/30/2023 .    If you have any questions or concerns, please don't hesitate to call.         Sincerely,        Haja Escalona Jr, DOM    CC: No Recipients

## 2023-12-29 NOTE — PROGRESS NOTES
"[facility]        NAME: Ying Reese is a 48 y.o. female  : 1975    MRN: [id number]  DATE: 2023  TIME: 9:47 AM    Pulse 98   Temp 98.9 °F (37.2 °C)   Resp 18   Ht 5' 4\" (1.626 m)   Wt 99.8 kg (220 lb)   LMP 05/15/2020   SpO2 97%   BMI 37.76 kg/m²     Assessment and Plan   Acute maxillary sinusitis, recurrence not specified [J01.00]  1. Acute maxillary sinusitis, recurrence not specified  azithromycin (ZITHROMAX) 250 mg tablet    benzonatate (TESSALON PERLES) 100 mg capsule    ciprofloxacin (CILOXAN) 0.3 % ophthalmic solution      2. Bronchitis  azithromycin (ZITHROMAX) 250 mg tablet    benzonatate (TESSALON PERLES) 100 mg capsule    ciprofloxacin (CILOXAN) 0.3 % ophthalmic solution      3. Acute bacterial conjunctivitis, unspecified laterality  azithromycin (ZITHROMAX) 250 mg tablet    benzonatate (TESSALON PERLES) 100 mg capsule    ciprofloxacin (CILOXAN) 0.3 % ophthalmic solution            Patient Instructions       Follow up with PCP in 3-5 days.  Proceed to  ER if symptoms worsen.    Chief Complaint     Chief Complaint   Patient presents with    Cold Like Symptoms     Sinus pressure, cough, congestion, headache, fatigue, swollen lymph nodes ongoing for over a week     COVID-tested at home which was negative     Conjunctivitis     Bilateral eye redness, yellow crusting x2 days          History of Present Illness       Pt with productive cough and congestion  for 10 days, pt with eye d/c x 2 days , pt uses contacts     Conjunctivitis   Associated symptoms include congestion and cough.       Review of Systems   Review of Systems   Constitutional: Negative.    HENT:  Positive for congestion, sinus pressure and sinus pain.    Eyes: Negative.    Respiratory:  Positive for cough.    Cardiovascular: Negative.    Gastrointestinal: Negative.    Endocrine: Negative.    Genitourinary: Negative.    Musculoskeletal: Negative.    Skin: Negative.    Allergic/Immunologic: Negative.  "   Neurological: Negative.    Hematological: Negative.    Psychiatric/Behavioral: Negative.     All other systems reviewed and are negative.        Current Medications       Current Outpatient Medications:     azithromycin (ZITHROMAX) 250 mg tablet, Take 2 tablets today then 1 tablet daily x 4 days, Disp: 6 tablet, Rfl: 0    benzonatate (TESSALON PERLES) 100 mg capsule, Take 1 capsule (100 mg total) by mouth 3 (three) times a day as needed for cough, Disp: 20 capsule, Rfl: 0    ciprofloxacin (CILOXAN) 0.3 % ophthalmic solution, Administer 1 drop to both eyes every 2 (two) hours, Disp: 5 mL, Rfl: 0    escitalopram (LEXAPRO) 10 mg tablet, Take 1 tablet (10 mg total) by mouth daily at bedtime, Disp: 90 tablet, Rfl: 1    metFORMIN (GLUCOPHAGE-XR) 500 mg 24 hr tablet, Take 1 tablet (500 mg total) by mouth daily with dinner, Disp: 90 tablet, Rfl: 1    rosuvastatin (CRESTOR) 10 MG tablet, Take 1 tablet (10 mg total) by mouth daily, Disp: 90 tablet, Rfl: 1    multivitamin-minerals (CENTRUM ADULTS) tablet, Take 1 tablet by mouth if needed, Disp: , Rfl:     Current Allergies     Allergies as of 2023 - Reviewed 2023   Allergen Reaction Noted    Amoxicillin Rash 2018            The following portions of the patient's history were reviewed and updated as appropriate: allergies, current medications, past family history, past medical history, past social history, past surgical history and problem list.     Past Medical History:   Diagnosis Date    Back disorder     Depression     PONV (postoperative nausea and vomiting)        Past Surgical History:   Procedure Laterality Date    BACK SURGERY      Lumbar( L3)     SECTION  ,2008    X2    CHOLECYSTECTOMY      ENDOMETRIAL BIOPSY      EXAMINATION UNDER ANESTHESIA N/A 5/15/2020    Procedure: EXAM UNDER ANESTHESIA (EUA);  Surgeon: Theresa Chapa MD;  [facility];  Service: Gynecology    INDUCED       times 2    HI HYSTEROSCOPY BX  "ENDOMETRIUM&/POLYPC W/WO D&C N/A 5/15/2020    Procedure: DILATATION AND CURETTAGE (D&C) WITH HYSTEROSCOPY;  Surgeon: Theresa Chapa MD;  [facility];  Service: Gynecology    AZ INSERTION INTRAUTERINE DEVICE IUD N/A 5/15/2020    Procedure: Mirena insertion;  Surgeon: Theresa Chapa MD;  [facility];  Service: Gynecology    AZ LAPS TOTAL HYSTERECT 250 GM/< W/RMVL TUBE/OVARY N/A 5/19/2021    Procedure: ROBOTIC ASSISTED LAPAROSCOPIC HYSTERECTOMY BILATERAL SALPINGECTOMY EUA, REMOVAL OF IUD, LYSIS OF ADHESIONS, CYSTOSCOPY;  Surgeon: Theresa Chapa MD;  [facility];  Service: Gynecology    TUBAL LIGATION      WISDOM TOOTH EXTRACTION  10/05/2020       Family History   Problem Relation Age of Onset    Diabetes Mother     Hypertension Mother     Sudden death Mother     Anxiety disorder Family         symptom    Other Family         breast disorders    Heart disease Other         cardiac disorder    Heart disease Other         cardiac disorder    Depression Father     Depression Sister     Colon cancer Neg Hx     Ovarian cancer Neg Hx     Cervical cancer Neg Hx     Uterine cancer Neg Hx          Medications have been verified.        Objective   Pulse 98   Temp 98.9 °F (37.2 °C)   Resp 18   Ht 5' 4\" (1.626 m)   Wt 99.8 kg (220 lb)   LMP 05/15/2020   SpO2 97%   BMI 37.76 kg/m²        Physical Exam     Physical Exam  Vitals and nursing note reviewed.   Constitutional:       Appearance: Normal appearance. She is normal weight.      Comments: Home covid test negative    HENT:      Head: Normocephalic and atraumatic.      Right Ear: Tympanic membrane, ear canal and external ear normal.      Left Ear: Tympanic membrane, ear canal and external ear normal.      Nose: Congestion and rhinorrhea present.      Comments: Boggy mucosa     Mouth/Throat:      Pharynx: Posterior oropharyngeal erythema present.   Eyes:      Extraocular Movements: Extraocular movements intact.      Pupils: Pupils are " equal, round, and reactive to light.      Comments: Conj minimal injection  + minor d/c  + red reflex anterior chamber wnl  pt reports vision is her normal  lids wnl   Cardiovascular:      Rate and Rhythm: Normal rate and regular rhythm.      Pulses: Normal pulses.      Heart sounds: Normal heart sounds.   Pulmonary:      Effort: Pulmonary effort is normal.      Comments: Minor coarse sounds   Abdominal:      General: Abdomen is flat. Bowel sounds are normal.      Palpations: Abdomen is soft.   Musculoskeletal:         General: Normal range of motion.      Cervical back: Normal range of motion and neck supple.   Lymphadenopathy:      Cervical: Cervical adenopathy present.   Skin:     General: Skin is warm.   Neurological:      Mental Status: She is alert and oriented to person, place, and time.

## 2024-01-04 ENCOUNTER — OFFICE VISIT (OUTPATIENT)
Dept: URGENT CARE | Facility: CLINIC | Age: 49
End: 2024-01-04
Payer: COMMERCIAL

## 2024-01-04 ENCOUNTER — APPOINTMENT (EMERGENCY)
Dept: RADIOLOGY | Facility: HOSPITAL | Age: 49
End: 2024-01-04
Payer: COMMERCIAL

## 2024-01-04 ENCOUNTER — HOSPITAL ENCOUNTER (EMERGENCY)
Facility: HOSPITAL | Age: 49
Discharge: HOME | End: 2024-01-04
Attending: EMERGENCY MEDICINE
Payer: COMMERCIAL

## 2024-01-04 VITALS
HEART RATE: 105 BPM | DIASTOLIC BLOOD PRESSURE: 78 MMHG | OXYGEN SATURATION: 96 % | RESPIRATION RATE: 18 BRPM | SYSTOLIC BLOOD PRESSURE: 138 MMHG | TEMPERATURE: 100.9 F

## 2024-01-04 VITALS
DIASTOLIC BLOOD PRESSURE: 84 MMHG | HEIGHT: 64 IN | BODY MASS INDEX: 37.56 KG/M2 | SYSTOLIC BLOOD PRESSURE: 132 MMHG | HEART RATE: 119 BPM | OXYGEN SATURATION: 95 % | TEMPERATURE: 99.2 F | RESPIRATION RATE: 18 BRPM | WEIGHT: 220 LBS

## 2024-01-04 DIAGNOSIS — R11.0 NAUSEA: ICD-10-CM

## 2024-01-04 DIAGNOSIS — U07.1 COVID: Primary | ICD-10-CM

## 2024-01-04 DIAGNOSIS — R11.2 N&V (NAUSEA AND VOMITING): ICD-10-CM

## 2024-01-04 DIAGNOSIS — R05.1 ACUTE COUGH: ICD-10-CM

## 2024-01-04 DIAGNOSIS — J01.00 ACUTE NON-RECURRENT MAXILLARY SINUSITIS: Primary | ICD-10-CM

## 2024-01-04 DIAGNOSIS — M79.10 MYALGIA: ICD-10-CM

## 2024-01-04 LAB
ALBUMIN SERPL BCP-MCNC: 4.4 G/DL (ref 3.5–5)
ALP SERPL-CCNC: 76 U/L (ref 34–104)
ALT SERPL W P-5'-P-CCNC: 24 U/L (ref 7–52)
ANION GAP SERPL CALCULATED.3IONS-SCNC: 9 MMOL/L
AST SERPL W P-5'-P-CCNC: 23 U/L (ref 13–39)
BASOPHILS # BLD AUTO: 0.05 THOUSANDS/ÂΜL (ref 0–0.1)
BASOPHILS NFR BLD AUTO: 1 % (ref 0–1)
BILIRUB SERPL-MCNC: 0.57 MG/DL (ref 0.2–1)
BUN SERPL-MCNC: 14 MG/DL (ref 5–25)
CALCIUM SERPL-MCNC: 9.1 MG/DL (ref 8.4–10.2)
CHLORIDE SERPL-SCNC: 101 MMOL/L (ref 96–108)
CO2 SERPL-SCNC: 26 MMOL/L (ref 21–32)
CREAT SERPL-MCNC: 0.84 MG/DL (ref 0.6–1.3)
EOSINOPHIL # BLD AUTO: 0.01 THOUSAND/ÂΜL (ref 0–0.61)
EOSINOPHIL NFR BLD AUTO: 0 % (ref 0–6)
ERYTHROCYTE [DISTWIDTH] IN BLOOD BY AUTOMATED COUNT: 13 % (ref 11.6–15.1)
FLUAV RNA RESP QL NAA+PROBE: NEGATIVE
FLUBV RNA RESP QL NAA+PROBE: NEGATIVE
GFR SERPL CREATININE-BSD FRML MDRD: 82 ML/MIN/1.73SQ M
GLUCOSE SERPL-MCNC: 109 MG/DL (ref 65–140)
HCT VFR BLD AUTO: 43.7 % (ref 34.8–46.1)
HGB BLD-MCNC: 14.4 G/DL (ref 11.5–15.4)
IMM GRANULOCYTES # BLD AUTO: 0.02 THOUSAND/UL (ref 0–0.2)
IMM GRANULOCYTES NFR BLD AUTO: 0 % (ref 0–2)
LIPASE SERPL-CCNC: 11 U/L (ref 11–82)
LYMPHOCYTES # BLD AUTO: 0.78 THOUSANDS/ÂΜL (ref 0.6–4.47)
LYMPHOCYTES NFR BLD AUTO: 11 % (ref 14–44)
MCH RBC QN AUTO: 29.8 PG (ref 26.8–34.3)
MCHC RBC AUTO-ENTMCNC: 33 G/DL (ref 31.4–37.4)
MCV RBC AUTO: 91 FL (ref 82–98)
MONOCYTES # BLD AUTO: 0.42 THOUSAND/ÂΜL (ref 0.17–1.22)
MONOCYTES NFR BLD AUTO: 6 % (ref 4–12)
NEUTROPHILS # BLD AUTO: 6.02 THOUSANDS/ÂΜL (ref 1.85–7.62)
NEUTS SEG NFR BLD AUTO: 82 % (ref 43–75)
NRBC BLD AUTO-RTO: 0 /100 WBCS
PLATELET # BLD AUTO: 181 THOUSANDS/UL (ref 149–390)
PMV BLD AUTO: 9.3 FL (ref 8.9–12.7)
POTASSIUM SERPL-SCNC: 4.1 MMOL/L (ref 3.5–5.3)
PROT SERPL-MCNC: 7.9 G/DL (ref 6.4–8.4)
RBC # BLD AUTO: 4.83 MILLION/UL (ref 3.81–5.12)
RSV RNA RESP QL NAA+PROBE: NEGATIVE
SARS-COV-2 RNA RESP QL NAA+PROBE: POSITIVE
SODIUM SERPL-SCNC: 136 MMOL/L (ref 135–147)
WBC # BLD AUTO: 7.3 THOUSAND/UL (ref 4.31–10.16)

## 2024-01-04 PROCEDURE — 99284 EMERGENCY DEPT VISIT MOD MDM: CPT | Performed by: EMERGENCY MEDICINE

## 2024-01-04 PROCEDURE — 80053 COMPREHEN METABOLIC PANEL: CPT

## 2024-01-04 PROCEDURE — 71046 X-RAY EXAM CHEST 2 VIEWS: CPT

## 2024-01-04 PROCEDURE — 96361 HYDRATE IV INFUSION ADD-ON: CPT

## 2024-01-04 PROCEDURE — 99213 OFFICE O/P EST LOW 20 MIN: CPT | Performed by: NURSE PRACTITIONER

## 2024-01-04 PROCEDURE — 0241U HB NFCT DS VIR RESP RNA 4 TRGT: CPT | Performed by: EMERGENCY MEDICINE

## 2024-01-04 PROCEDURE — 96374 THER/PROPH/DIAG INJ IV PUSH: CPT

## 2024-01-04 PROCEDURE — 93005 ELECTROCARDIOGRAM TRACING: CPT

## 2024-01-04 PROCEDURE — 99284 EMERGENCY DEPT VISIT MOD MDM: CPT

## 2024-01-04 PROCEDURE — 85025 COMPLETE CBC W/AUTO DIFF WBC: CPT

## 2024-01-04 PROCEDURE — 83690 ASSAY OF LIPASE: CPT

## 2024-01-04 PROCEDURE — 36415 COLL VENOUS BLD VENIPUNCTURE: CPT

## 2024-01-04 RX ORDER — ONDANSETRON 4 MG/1
4 TABLET, FILM COATED ORAL EVERY 8 HOURS PRN
Qty: 20 TABLET | Refills: 0 | Status: SHIPPED | OUTPATIENT
Start: 2024-01-04

## 2024-01-04 RX ORDER — DOXYCYCLINE 100 MG/1
100 TABLET ORAL 2 TIMES DAILY
Qty: 14 TABLET | Refills: 0 | Status: SHIPPED | OUTPATIENT
Start: 2024-01-04 | End: 2024-01-11

## 2024-01-04 RX ORDER — PREDNISONE 20 MG/1
20 TABLET ORAL 2 TIMES DAILY WITH MEALS
Qty: 10 TABLET | Refills: 0 | Status: SHIPPED | OUTPATIENT
Start: 2024-01-04 | End: 2024-01-09

## 2024-01-04 RX ORDER — ONDANSETRON 2 MG/ML
4 INJECTION INTRAMUSCULAR; INTRAVENOUS ONCE AS NEEDED
Status: DISCONTINUED | OUTPATIENT
Start: 2024-01-04 | End: 2024-01-04 | Stop reason: HOSPADM

## 2024-01-04 RX ORDER — KETOROLAC TROMETHAMINE 30 MG/ML
15 INJECTION, SOLUTION INTRAMUSCULAR; INTRAVENOUS ONCE
Status: COMPLETED | OUTPATIENT
Start: 2024-01-04 | End: 2024-01-04

## 2024-01-04 RX ORDER — ACETAMINOPHEN 325 MG/1
975 TABLET ORAL ONCE
Status: COMPLETED | OUTPATIENT
Start: 2024-01-04 | End: 2024-01-04

## 2024-01-04 RX ORDER — ONDANSETRON 4 MG/1
4 TABLET, ORALLY DISINTEGRATING ORAL EVERY 6 HOURS PRN
Qty: 20 TABLET | Refills: 0 | Status: SHIPPED | OUTPATIENT
Start: 2024-01-04

## 2024-01-04 RX ADMIN — ACETAMINOPHEN 975 MG: 325 TABLET, FILM COATED ORAL at 15:58

## 2024-01-04 RX ADMIN — SODIUM CHLORIDE 1000 ML: 0.9 INJECTION, SOLUTION INTRAVENOUS at 16:00

## 2024-01-04 RX ADMIN — KETOROLAC TROMETHAMINE 15 MG: 30 INJECTION, SOLUTION INTRAMUSCULAR; INTRAVENOUS at 15:58

## 2024-01-04 NOTE — PROGRESS NOTES
[facility]        NAME: Ying Reese is a 48 y.o. female  : 1975    MRN: [id number]  DATE: 2024  TIME: 1:21 PM    Assessment and Plan   Acute non-recurrent maxillary sinusitis [J01.00]  1. Acute non-recurrent maxillary sinusitis  doxycycline (ADOXA) 100 MG tablet    predniSONE 20 mg tablet      2. Nausea  ondansetron (ZOFRAN) 4 mg tablet      3. Acute cough  COVID/FLU        Patient offered covid/flu swab at visit, deferred at this time. Patient called after discharged and requested testing. Patient to return for testing.     Patient Instructions     Patient Instructions   Take medication as directed.  Rest and drink plenty of fluids. A cool mist humidifier and saline rinse can be helpful.  If you develop a worsening cough, chest pain, shortness of breath, palpitations, coughing up blood, prolonged high fever, severe headache, dizziness, any new or concerning symptoms please return or proceed ER.  Recommend following up with PCP in 3-5 days        Chief Complaint     Chief Complaint   Patient presents with    Cold Like Symptoms     Patient c/o sore throat, cough, chills, body aches and headache that started .         History of Present Illness       Sinusitis  This is a new problem. The current episode started 1 to 4 weeks ago (2 weeks ago). The problem is unchanged. There has been no fever. The pain is moderate. Associated symptoms include congestion, coughing, headaches, sinus pressure and a sore throat. Pertinent negatives include no chills, diaphoresis, ear pain, hoarse voice, neck pain, shortness of breath, sneezing or swollen glands. Treatments tried: zpack, tessalon pearls. The treatment provided mild relief.       Review of Systems   Review of Systems   Constitutional:  Negative for chills, diaphoresis, fatigue and fever.   HENT:  Positive for congestion, postnasal drip, rhinorrhea, sinus pressure, sinus pain and sore throat. Negative for ear pain, facial swelling,  hoarse voice, sneezing, tinnitus and trouble swallowing.    Eyes: Negative.    Respiratory:  Positive for cough. Negative for chest tightness, shortness of breath, wheezing and stridor.    Cardiovascular:  Negative for chest pain and palpitations.   Gastrointestinal: Negative.    Musculoskeletal:  Negative for arthralgias, back pain, joint swelling, myalgias, neck pain and neck stiffness.   Neurological:  Positive for headaches. Negative for dizziness, syncope, facial asymmetry, weakness, light-headedness and numbness.         Current Medications       Current Outpatient Medications:     benzonatate (TESSALON PERLES) 100 mg capsule, Take 1 capsule (100 mg total) by mouth 3 (three) times a day as needed for cough, Disp: 20 capsule, Rfl: 0    doxycycline (ADOXA) 100 MG tablet, Take 1 tablet (100 mg total) by mouth 2 (two) times a day for 7 days, Disp: 14 tablet, Rfl: 0    escitalopram (LEXAPRO) 10 mg tablet, Take 1 tablet (10 mg total) by mouth daily at bedtime, Disp: 90 tablet, Rfl: 1    metFORMIN (GLUCOPHAGE-XR) 500 mg 24 hr tablet, Take 1 tablet (500 mg total) by mouth daily with dinner, Disp: 90 tablet, Rfl: 1    multivitamin-minerals (CENTRUM ADULTS) tablet, Take 1 tablet by mouth if needed, Disp: , Rfl:     ondansetron (ZOFRAN) 4 mg tablet, Take 1 tablet (4 mg total) by mouth every 8 (eight) hours as needed for nausea or vomiting, Disp: 20 tablet, Rfl: 0    predniSONE 20 mg tablet, Take 1 tablet (20 mg total) by mouth 2 (two) times a day with meals for 5 days, Disp: 10 tablet, Rfl: 0    rosuvastatin (CRESTOR) 10 MG tablet, Take 1 tablet (10 mg total) by mouth daily, Disp: 90 tablet, Rfl: 1    ciprofloxacin (CILOXAN) 0.3 % ophthalmic solution, Administer 1 drop to both eyes every 2 (two) hours (Patient not taking: Reported on 1/4/2024), Disp: 5 mL, Rfl: 0    Current Allergies     Allergies as of 01/04/2024 - Reviewed 01/04/2024   Allergen Reaction Noted    Amoxicillin Rash 02/12/2018            The following  portions of the patient's history were reviewed and updated as appropriate: allergies, current medications, past family history, past medical history, past social history, past surgical history and problem list.     Past Medical History:   Diagnosis Date    Back disorder     Depression     PONV (postoperative nausea and vomiting)        Past Surgical History:   Procedure Laterality Date    BACK SURGERY      Lumbar( L3)     SECTION  ,2008    X2    CHOLECYSTECTOMY      ENDOMETRIAL BIOPSY      EXAMINATION UNDER ANESTHESIA N/A 5/15/2020    Procedure: EXAM UNDER ANESTHESIA (EUA);  Surgeon: Theresa Chapa MD;  [facility];  Service: Gynecology    INDUCED       times 2    OK HYSTEROSCOPY BX ENDOMETRIUM&/POLYPC W/WO D&C N/A 5/15/2020    Procedure: DILATATION AND CURETTAGE (D&C) WITH HYSTEROSCOPY;  Surgeon: Theresa Chapa MD;  [facility];  Service: Gynecology    OK INSERTION INTRAUTERINE DEVICE IUD N/A 5/15/2020    Procedure: Mirena insertion;  Surgeon: Theresa Chapa MD;  [facility];  Service: Gynecology    OK LAPS TOTAL HYSTERECT 250 GM/< W/RMVL TUBE/OVARY N/A 2021    Procedure: ROBOTIC ASSISTED LAPAROSCOPIC HYSTERECTOMY BILATERAL SALPINGECTOMY EUA, REMOVAL OF IUD, LYSIS OF ADHESIONS, CYSTOSCOPY;  Surgeon: Theresa Chapa MD;  [facility];  Service: Gynecology    TUBAL LIGATION      WISDOM TOOTH EXTRACTION  10/05/2020       Family History   Problem Relation Age of Onset    Diabetes Mother     Hypertension Mother     Sudden death Mother     Anxiety disorder Family         symptom    Other Family         breast disorders    Heart disease Other         cardiac disorder    Heart disease Other         cardiac disorder    Depression Father     Depression Sister     Colon cancer Neg Hx     Ovarian cancer Neg Hx     Cervical cancer Neg Hx     Uterine cancer Neg Hx          Medications have been verified.        Objective   /84   Pulse (!) 119    "Temp 99.2 °F (37.3 °C) (Temporal)   Resp 18   Ht 5' 4\" (1.626 m)   Wt 99.8 kg (220 lb)   LMP 05/15/2020   SpO2 95%   BMI 37.76 kg/m²   Patient's last menstrual period was 05/15/2020.       Physical Exam     Physical Exam  Constitutional:       General: She is not in acute distress.     Appearance: She is well-developed. She is not diaphoretic.   HENT:      Head: Normocephalic and atraumatic.      Right Ear: Hearing, tympanic membrane, ear canal and external ear normal.      Left Ear: Hearing, tympanic membrane, ear canal and external ear normal.      Nose: Congestion and rhinorrhea present. No mucosal edema.      Right Sinus: Maxillary sinus tenderness present. No frontal sinus tenderness.      Left Sinus: Maxillary sinus tenderness present. No frontal sinus tenderness.      Mouth/Throat:      Mouth: Mucous membranes are moist.      Pharynx: Oropharynx is clear. Uvula midline.   Cardiovascular:      Rate and Rhythm: Normal rate and regular rhythm.      Heart sounds: Normal heart sounds, S1 normal and S2 normal.   Pulmonary:      Effort: Pulmonary effort is normal.      Breath sounds: Normal breath sounds and air entry.   Lymphadenopathy:      Cervical: No cervical adenopathy.   Skin:     General: Skin is warm and dry.      Capillary Refill: Capillary refill takes less than 2 seconds.   Neurological:      Mental Status: She is alert and oriented to person, place, and time.                   "

## 2024-01-04 NOTE — ED PROVIDER NOTES
History  Chief Complaint   Patient presents with    Vomiting     Pt states she was seen at an urgent care and dx with a sinus infection last week. Reports today is still having the same sx, along with N/V. Pt also states she is more fatigued than normal.      48-year-old female with history of HLD and diabetes presents with cold-like symptoms.  Patient states congestion, rhinorrhea, right-sided headache, myalgias, fevers, nonproductive cough since 12/23/2023.  Was a evaluated at urgent care and started on Cipro eyedrops and azithromycin for possible rhinosinusitis.  Patient states symptoms did not get better and was evaluated at urgent care again today and switched over to doxycycline for possible recurrent rhinosinusitis.  Has tested at home for COVID and states negative.  Episode of nausea and vomiting today given Zofran and urgent care.  History of hysterectomy.    Associated fevers, fatigue, headaches, myalgias, nonproductive cough, congestion, rhinorrhea  Denies rash, neck stiffness, sore throat, swallowing, chest pain, shortness of breath, exertional dyspnea, abdominal pain, diarrhea, constipation, dysuria, frequency, urgency, hemoptysis, palpitations, recent long travel, hormone use, leg swelling, recent surgery, history of cancer, changes in urinary or bowel habits.      Vomiting  Associated symptoms: fever    Associated symptoms: no chills        Prior to Admission Medications   Prescriptions Last Dose Informant Patient Reported? Taking?   benzonatate (TESSALON PERLES) 100 mg capsule Not Taking  No No   Sig: Take 1 capsule (100 mg total) by mouth 3 (three) times a day as needed for cough   Patient not taking: Reported on 1/4/2024   ciprofloxacin (CILOXAN) 0.3 % ophthalmic solution   No No   Sig: Administer 1 drop to both eyes every 2 (two) hours   Patient not taking: Reported on 1/4/2024   doxycycline (ADOXA) 100 MG tablet   No No   Sig: Take 1 tablet (100 mg total) by mouth 2 (two) times a day for 7 days    escitalopram (LEXAPRO) 10 mg tablet 1/3/2024  No Yes   Sig: Take 1 tablet (10 mg total) by mouth daily at bedtime   metFORMIN (GLUCOPHAGE-XR) 500 mg 24 hr tablet 1/3/2024  No Yes   Sig: Take 1 tablet (500 mg total) by mouth daily with dinner   multivitamin-minerals (CENTRUM ADULTS) tablet Past Month Self Yes Yes   Sig: Take 1 tablet by mouth if needed   ondansetron (ZOFRAN) 4 mg tablet 2024  No Yes   Sig: Take 1 tablet (4 mg total) by mouth every 8 (eight) hours as needed for nausea or vomiting   predniSONE 20 mg tablet   No No   Sig: Take 1 tablet (20 mg total) by mouth 2 (two) times a day with meals for 5 days   rosuvastatin (CRESTOR) 10 MG tablet 1/3/2024  No Yes   Sig: Take 1 tablet (10 mg total) by mouth daily      Facility-Administered Medications: None       Past Medical History:   Diagnosis Date    Back disorder     Depression     PONV (postoperative nausea and vomiting)        Past Surgical History:   Procedure Laterality Date    BACK SURGERY      Lumbar( L3)     SECTION  ,2008    X2    CHOLECYSTECTOMY      ENDOMETRIAL BIOPSY      EXAMINATION UNDER ANESTHESIA N/A 5/15/2020    Procedure: EXAM UNDER ANESTHESIA (EUA);  Surgeon: Theresa Chapa MD;  Location: [facility];  Service: Gynecology    INDUCED       times 2    OR HYSTEROSCOPY BX ENDOMETRIUM&/POLYPC W/WO D&C N/A 5/15/2020    Procedure: DILATATION AND CURETTAGE (D&C) WITH HYSTEROSCOPY;  Surgeon: Theresa Chapa MD;  Location: [facility];  Service: Gynecology    OR INSERTION INTRAUTERINE DEVICE IUD N/A 5/15/2020    Procedure: Mirena insertion;  Surgeon: Theresa Chapa MD;  Location: [facility];  Service: Gynecology    OR LAPS TOTAL HYSTERECT 250 GM/< W/RMVL TUBE/OVARY N/A 2021    Procedure: ROBOTIC ASSISTED LAPAROSCOPIC HYSTERECTOMY BILATERAL SALPINGECTOMY EUA, REMOVAL OF IUD, LYSIS OF ADHESIONS, CYSTOSCOPY;  Surgeon: Theresa Chapa MD;  [facility];  Service: Gynecology    TUBAL LIGATION      BECCA  TOOTH EXTRACTION  10/05/2020       Family History   Problem Relation Age of Onset    Diabetes Mother     Hypertension Mother     Sudden death Mother     Anxiety disorder Family         symptom    Other Family         breast disorders    Heart disease Other         cardiac disorder    Heart disease Other         cardiac disorder    Depression Father     Depression Sister     Colon cancer Neg Hx     Ovarian cancer Neg Hx     Cervical cancer Neg Hx     Uterine cancer Neg Hx      I have reviewed and agree with the history as documented.    E-Cigarette/Vaping    E-Cigarette Use Never User      E-Cigarette/Vaping Substances    Nicotine No     THC No     CBD No     Flavoring No     Other No     Unknown No      Social History     Tobacco Use    Smoking status: Never    Smokeless tobacco: Never   Vaping Use    Vaping status: Never Used   Substance Use Topics    Alcohol use: Yes     Comment: socially    Drug use: No        Review of Systems   Constitutional:  Positive for fatigue and fever. Negative for activity change, appetite change, chills and diaphoresis.   Gastrointestinal:  Positive for vomiting.   All other systems reviewed and are negative.      Physical Exam  ED Triage Vitals   Temperature Pulse Respirations Blood Pressure SpO2   01/04/24 1522 01/04/24 1522 01/04/24 1522 01/04/24 1522 01/04/24 1522   (!) 100.9 °F (38.3 °C) (!) 116 18 138/78 96 %      Temp Source Heart Rate Source Patient Position - Orthostatic VS BP Location FiO2 (%)   01/04/24 1522 01/04/24 1522 01/04/24 1522 01/04/24 1522 --   Oral Monitor Sitting Right arm       Pain Score       01/04/24 1558       6             Orthostatic Vital Signs  Vitals:    01/04/24 1522 01/04/24 1652   BP: 138/78    Pulse: (!) 116 105   Patient Position - Orthostatic VS: Sitting        Physical Exam  Vitals and nursing note reviewed.   Constitutional:       General: She is not in acute distress.     Appearance: Normal appearance. She is normal weight. She is not  ill-appearing, toxic-appearing or diaphoretic.   HENT:      Head: Normocephalic and atraumatic.      Right Ear: Tympanic membrane, ear canal and external ear normal. There is no impacted cerumen.      Left Ear: Tympanic membrane, ear canal and external ear normal. There is no impacted cerumen.      Nose: Nose normal. No congestion or rhinorrhea.      Mouth/Throat:      Mouth: Mucous membranes are moist.      Pharynx: Oropharynx is clear. No oropharyngeal exudate or posterior oropharyngeal erythema.   Eyes:      General: No scleral icterus.        Right eye: No discharge.         Left eye: No discharge.      Extraocular Movements: Extraocular movements intact.      Conjunctiva/sclera: Conjunctivae normal.      Pupils: Pupils are equal, round, and reactive to light.   Cardiovascular:      Rate and Rhythm: Normal rate and regular rhythm.      Pulses: Normal pulses.      Heart sounds: Normal heart sounds. No murmur heard.     No friction rub. No gallop.   Pulmonary:      Effort: Pulmonary effort is normal. No respiratory distress.      Breath sounds: Normal breath sounds. No stridor. No wheezing, rhonchi or rales.   Chest:      Chest wall: No tenderness.   Abdominal:      General: There is no distension.      Palpations: Abdomen is soft. There is no mass.      Tenderness: There is no abdominal tenderness. There is no right CVA tenderness, left CVA tenderness, guarding or rebound.      Hernia: No hernia is present.   Musculoskeletal:         General: No swelling, tenderness, deformity or signs of injury. Normal range of motion.      Cervical back: Normal range of motion and neck supple. No rigidity or tenderness.      Right lower leg: No edema.      Left lower leg: No edema.   Lymphadenopathy:      Cervical: No cervical adenopathy.   Skin:     General: Skin is warm and dry.      Capillary Refill: Capillary refill takes less than 2 seconds.      Coloration: Skin is not cyanotic, jaundiced or pale.      Findings: No  bruising, erythema, lesion, petechiae or rash.   Neurological:      General: No focal deficit present.      Mental Status: She is alert and oriented to person, place, and time. Mental status is at baseline.   Psychiatric:         Behavior: Behavior normal.         ED Medications  Medications   sodium chloride 0.9 % bolus 1,000 mL (1,000 mL Intravenous New Bag 1/4/24 1600)   ondansetron (ZOFRAN) injection 4 mg (has no administration in time range)   acetaminophen (TYLENOL) tablet 975 mg (975 mg Oral Given 1/4/24 1558)   ketorolac (TORADOL) injection 15 mg (15 mg Intravenous Given 1/4/24 1558)       Diagnostic Studies  Results Reviewed       Procedure Component Value Units Date/Time    FLU/RSV/COVID - if FLU/RSV clinically relevant [[id number]]  (Abnormal) Collected: 01/04/24 1531    Lab Status: Final result Specimen: Nares from Nose Updated: 01/04/24 1624     SARS-CoV-2 Positive     INFLUENZA A PCR Negative     INFLUENZA B PCR Negative     RSV PCR Negative    Narrative:      FOR PEDIATRIC PATIENTS - copy/paste COVID Guidelines URL to browser: [email or web address]    SARS-CoV-2 assay is a Nucleic Acid Amplification assay intended for the  qualitative detection of nucleic acid from SARS-CoV-2 in nasopharyngeal  swabs. Results are for the presumptive identification of SARS-CoV-2 RNA.    Positive results are indicative of infection with SARS-CoV-2, the virus  causing COVID-19, but do not rule out bacterial infection or co-infection  with other viruses. Laboratories within the [place] and its  territories are required to report all positive results to the appropriate  public health authorities. Negative results do not preclude SARS-CoV-2  infection and should not be used as the sole basis for treatment or other  patient management decisions. Negative results must be combined with  clinical observations, patient history, and epidemiological information.  This test  has not been FDA cleared or approved.    This test has been authorized by FDA under an Emergency Use Authorization  (EUA). This test is only authorized for the duration of time the  declaration that circumstances exist justifying the authorization of the  emergency use of an in vitro diagnostic tests for detection of SARS-CoV-2  virus and/or diagnosis of COVID-19 infection under section 564(b)(1) of  the Act, 21 U.S.C. 360bbb-3(b)(1), unless the authorization is terminated  or revoked sooner. The test has been validated but independent review by FDA  and CLIA is pending.    Test performed using [employer] GeneXpert: This RT-PCR assay targets N2,  a region unique to SARS-CoV-2. A conserved region in the E-gene was chosen  for pan-Sarbecovirus detection which includes SARS-CoV-2.    According to CMS-2020-01-R, this platform meets the definition of high-throughput technology.    Comprehensive metabolic panel [[id number]] Collected: 01/04/24 1601    Lab Status: Final result Specimen: Blood from Arm, Right Updated: 01/04/24 1624     Sodium 136 mmol/L      Potassium 4.1 mmol/L      Chloride 101 mmol/L      CO2 26 mmol/L      ANION GAP 9 mmol/L      BUN 14 mg/dL      Creatinine 0.84 mg/dL      Glucose 109 mg/dL      Calcium 9.1 mg/dL      AST 23 U/L      ALT 24 U/L      Alkaline Phosphatase 76 U/L      Total Protein 7.9 g/dL      Albumin 4.4 g/dL      Total Bilirubin 0.57 mg/dL      eGFR 82 ml/min/1.73sq m     Narrative:      National Kidney Disease Foundation guidelines for Chronic Kidney Disease (CKD):     Stage 1 with normal or high GFR (GFR > 90 mL/min/1.73 square meters)    Stage 2 Mild CKD (GFR = 60-89 mL/min/1.73 square meters)    Stage 3A Moderate CKD (GFR = 45-59 mL/min/1.73 square meters)    Stage 3B Moderate CKD (GFR = 30-44 mL/min/1.73 square meters)    Stage 4 Severe CKD (GFR = 15-29 mL/min/1.73 square meters)    Stage 5 End Stage CKD (GFR <15 mL/min/1.73 square meters)  Note: GFR calculation is accurate only with  a steady state creatinine    Lipase [[id number]]  (Normal) Collected: 01/04/24 1601    Lab Status: Final result Specimen: Blood from Arm, Right Updated: 01/04/24 1623     Lipase 11 u/L     CBC and differential [[id number]]  (Abnormal) Collected: 01/04/24 1601    Lab Status: Final result Specimen: Blood from Arm, Right Updated: 01/04/24 1609     WBC 7.30 Thousand/uL      RBC 4.83 Million/uL      Hemoglobin 14.4 g/dL      Hematocrit 43.7 %      MCV 91 fL      MCH 29.8 pg      MCHC 33.0 g/dL      RDW 13.0 %      MPV 9.3 fL      Platelets 181 Thousands/uL      nRBC 0 /100 WBCs      Neutrophils Relative 82 %      Immat GRANS % 0 %      Lymphocytes Relative 11 %      Monocytes Relative 6 %      Eosinophils Relative 0 %      Basophils Relative 1 %      Neutrophils Absolute 6.02 Thousands/µL      Immature Grans Absolute 0.02 Thousand/uL      Lymphocytes Absolute 0.78 Thousands/µL      Monocytes Absolute 0.42 Thousand/µL      Eosinophils Absolute 0.01 Thousand/µL      Basophils Absolute 0.05 Thousands/µL     UA w Reflex to Microscopic w Reflex to Culture [[id number]]     Lab Status: No result Specimen: Urine                    XR chest 2 views   ED Interpretation by Mable Bronson MD (01/04 1647)   No acute cardiopulmonary process noted.            Procedures  Procedures      ED Course  ED Course as of 01/04/24 1658   Thu Jan 04, 2024   1535 Temperature(!): 100.9 °F (38.3 °C)   1535 Pulse(!): 116   1645 SARS-COV-2(!): Positive   1657 EKG normal this tachycardia rate of 107, normal [place] interval, normal QRS interval, QTc 451, normal axis, no ST elevations, no ST depressions, no ischemic changes.                             SBIRT 22yo+      Flowsheet Row Most Recent Value   Initial Alcohol Screen: US AUDIT-C     1. How often do you have a drink containing alcohol? 0 Filed at: 01/04/2024 1539   2. How many drinks containing alcohol do you have on a typical day you are drinking?  0 Filed at: 01/04/2024 1539   3a. Male UNDER 65:  How often do you have five or more drinks on one occasion? 0 Filed at: 01/04/2024 1539   3b. FEMALE Any Age, or MALE 65+: How often do you have 4 or more drinks on one occassion? 0 Filed at: 01/04/2024 1539   Audit-C Score 0 Filed at: 01/04/2024 1539   NEO: How many times in the past year have you...    Used an illegal drug or used a prescription medication for non-medical reasons? Never Filed at: 01/04/2024 1539                  Medical Decision Making  8-year-old female presents with cold-like symptoms.  Differential includes but not limited to pneumonia, UTI, bronchitis, flu, COVID, RSV, viral syndrome  Doubtful of meningitis, PTA, RPA, Milan's angina, pericarditis/myocarditis, endocarditis.    CBC, CMP, lipase UA, flu, COVID, RSV  1 L IVF, Toradol, Tylenol  CXR    Labs within normal limits.  COVID-positive.  Symptomatic therapy.  Discussed Paxlovid, patient deferred at this time.  Charged home to self-care with strict return precautions.  Follow-up with PCP for reevaluation.  Patient understanding and agreement with plan.    Amount and/or Complexity of Data Reviewed  Labs: ordered. Decision-making details documented in ED Course.  Radiology: ordered and independent interpretation performed.    Risk  OTC drugs.  Prescription drug management.          Disposition  Final diagnoses:   COVID   N&V (nausea and vomiting)   Acute cough   Myalgia     Time reflects when diagnosis was documented in both MDM as applicable and the Disposition within this note       Time User Action Codes Description Comment    1/4/2024  4:45 PM Mable Bronson Add [U07.1] COVID     1/4/2024  4:45 PM Mable Bronson Add [R11.2] N&V (nausea and vomiting)     1/4/2024  4:45 PM Mable Bronson Add [R05.1] Acute cough     1/4/2024  4:45 PM Mable Bronson J Add [M79.10] Myalgia           ED Disposition       ED Disposition   Discharge    Condition   Stable    Date/Time   Thu Jan 4, 2024 [id number]    Comment   Ying Reese discharge to home/self  care.                   Follow-up Information       Follow up With Specialties Details Why Contact Info Additional Information    Azar Lucas MD Internal Medicine Schedule an appointment as soon as possible for a visit in 1 week  [address] [place] [address]  [address]  [facility] [address]  [phone]       [facility] Emergency Department Emergency Medicine Go to  If symptoms worsen [address] [facility] [address]  [phone] [facility] Emergency Department, [facility] [place], [place], [address]            Patient's Medications   Discharge Prescriptions    ONDANSETRON (ZOFRAN-ODT) 4 MG DISINTEGRATING TABLET    Take 1 tablet (4 mg total) by mouth every 6 (six) hours as needed for nausea or vomiting       Start Date: 1/4/2024  End Date: --       Order Dose: 4 mg       Quantity: 20 tablet    Refills: 0     No discharge procedures on file.    PDMP Review       None             ED Provider  Attending physically available and evaluated Ying Reese. I managed the patient along with the ED Attending.    Electronically Signed by           Mable Bronson MD  01/04/24 8772

## 2024-01-04 NOTE — Clinical Note
Ying Yuvalnamrataducia was seen and treated in our emergency department on 1/4/2024.    No restrictions            Diagnosis:     Ying  .    She may return on this date: 01/09/2024         If you have any questions or concerns, please don't hesitate to call.      Mable Bronson MD    ______________________________           _______________          _______________  Hospital Representative                              Date                                Time

## 2024-01-05 LAB
ATRIAL RATE: 107 BPM
P AXIS: 62 DEGREES
PR INTERVAL: 172 MS
QRS AXIS: 61 DEGREES
QRSD INTERVAL: 84 MS
QT INTERVAL: 338 MS
QTC INTERVAL: 451 MS
T WAVE AXIS: 73 DEGREES
VENTRICULAR RATE: 107 BPM

## 2024-01-05 NOTE — ED ATTENDING ATTESTATION
1/4/2024  IAlexandro DO, saw and evaluated the patient. I have discussed the patient with the resident/non-physician practitioner and agree with the resident's/non-physician practitioner's findings, Plan of Care, and MDM as documented in the resident's/non-physician practitioner's note, except where noted. All available labs and Radiology studies were reviewed.  I was present for key portions of any procedure(s) performed by the resident/non-physician practitioner and I was immediately available to provide assistance.       At this point I agree with the current assessment done in the Emergency Department.  I have conducted an independent evaluation of this patient a history and physical is as follows:    48-year-old female coming to the ED for evaluation of flulike symptoms, congestion, runny nose, headache, myalgias, fevers, cough.  Her symptoms started 12 days ago, had improved but then yesterday she developed a fever for the first time.  She went to urgent care prior to arrival today and then came here for further evaluation.    PE:  The patient is well appearing, non-toxic, in NAD. Head: normocephalic, atraumatic. HEENT: mucous membranes moist.  Lungs: CTA b/l, no resp distress. Heart: RRR. No M/R/G. Abdomen: NT, ND, no R/R/G. Neuro: CN2-12 intact, GCS 15. Normal strength and sensation, normal speech and gait. Cap refill < 2 sec, skin warm and dry. No rashes or lesions.    ED evaluation: Patient is positive for COVID-19.  I suspect she had a different URI and then likely developed COVID-19 over the past 24 hours given she was feeling much better and then developed a fever and a new cough.  Offered antiviral in the form of Paxlovid, however patient preferred to not take it.  She is fairly low risk for COVID-19, has been previously vaccinated and is a very well-controlled diabetic.  She stable for discharge home.    ED Course         Critical Care Time  Procedures

## 2024-02-19 ENCOUNTER — APPOINTMENT (OUTPATIENT)
Dept: LAB | Facility: IMAGING CENTER | Age: 49
End: 2024-02-19
Payer: COMMERCIAL

## 2024-02-19 DIAGNOSIS — E78.00 HYPERCHOLESTEROLEMIA: ICD-10-CM

## 2024-02-19 DIAGNOSIS — R73.03 PREDIABETES: ICD-10-CM

## 2024-02-19 LAB
CHOLEST SERPL-MCNC: 212 MG/DL
EST. AVERAGE GLUCOSE BLD GHB EST-MCNC: 131 MG/DL
GLUCOSE P FAST SERPL-MCNC: 113 MG/DL (ref 65–99)
HBA1C MFR BLD: 6.2 %
HDLC SERPL-MCNC: 48 MG/DL
LDLC SERPL CALC-MCNC: 96 MG/DL (ref 0–100)
NONHDLC SERPL-MCNC: 164 MG/DL
TRIGL SERPL-MCNC: 339 MG/DL

## 2024-02-19 PROCEDURE — 80061 LIPID PANEL: CPT

## 2024-02-19 PROCEDURE — 82947 ASSAY GLUCOSE BLOOD QUANT: CPT

## 2024-02-19 PROCEDURE — 36415 COLL VENOUS BLD VENIPUNCTURE: CPT

## 2024-02-19 PROCEDURE — 83036 HEMOGLOBIN GLYCOSYLATED A1C: CPT

## 2024-02-22 ENCOUNTER — OFFICE VISIT (OUTPATIENT)
Dept: INTERNAL MEDICINE CLINIC | Age: 49
End: 2024-02-22
Payer: COMMERCIAL

## 2024-02-22 ENCOUNTER — TELEPHONE (OUTPATIENT)
Dept: INTERNAL MEDICINE CLINIC | Age: 49
End: 2024-02-22

## 2024-02-22 VITALS
HEIGHT: 64 IN | OXYGEN SATURATION: 97 % | SYSTOLIC BLOOD PRESSURE: 118 MMHG | HEART RATE: 82 BPM | DIASTOLIC BLOOD PRESSURE: 80 MMHG | BODY MASS INDEX: 37.56 KG/M2 | TEMPERATURE: 98.2 F | WEIGHT: 220 LBS

## 2024-02-22 DIAGNOSIS — Z12.11 SCREENING FOR COLORECTAL CANCER: ICD-10-CM

## 2024-02-22 DIAGNOSIS — L65.9 HAIR LOSS: ICD-10-CM

## 2024-02-22 DIAGNOSIS — E78.00 HYPERCHOLESTEROLEMIA: ICD-10-CM

## 2024-02-22 DIAGNOSIS — F41.9 ANXIETY AND DEPRESSION: ICD-10-CM

## 2024-02-22 DIAGNOSIS — F32.A ANXIETY AND DEPRESSION: ICD-10-CM

## 2024-02-22 DIAGNOSIS — Z23 ENCOUNTER FOR IMMUNIZATION: ICD-10-CM

## 2024-02-22 DIAGNOSIS — R73.03 PREDIABETES: ICD-10-CM

## 2024-02-22 DIAGNOSIS — Z09 NEED FOR IMMUNIZATION FOLLOW-UP: ICD-10-CM

## 2024-02-22 DIAGNOSIS — Z12.12 SCREENING FOR COLORECTAL CANCER: ICD-10-CM

## 2024-02-22 DIAGNOSIS — R73.03 PREDIABETES: Primary | ICD-10-CM

## 2024-02-22 DIAGNOSIS — E55.9 VITAMIN D DEFICIENCY: ICD-10-CM

## 2024-02-22 DIAGNOSIS — Z12.31 ENCOUNTER FOR SCREENING MAMMOGRAM FOR MALIGNANT NEOPLASM OF BREAST: ICD-10-CM

## 2024-02-22 PROCEDURE — 90471 IMMUNIZATION ADMIN: CPT

## 2024-02-22 PROCEDURE — 99214 OFFICE O/P EST MOD 30 MIN: CPT

## 2024-02-22 PROCEDURE — 90715 TDAP VACCINE 7 YRS/> IM: CPT

## 2024-02-22 RX ORDER — ROSUVASTATIN CALCIUM 10 MG/1
20 TABLET, COATED ORAL DAILY
Qty: 90 TABLET | Refills: 1 | Status: SHIPPED | OUTPATIENT
Start: 2024-02-22 | End: 2024-02-23 | Stop reason: SDUPTHER

## 2024-02-22 RX ORDER — METFORMIN HYDROCHLORIDE 500 MG/1
500 TABLET, EXTENDED RELEASE ORAL 2 TIMES DAILY WITH MEALS
Qty: 90 TABLET | Refills: 1 | Status: SHIPPED | OUTPATIENT
Start: 2024-02-22 | End: 2024-02-22

## 2024-02-22 RX ORDER — ESCITALOPRAM OXALATE 10 MG/1
10 TABLET ORAL
Qty: 30 TABLET | Refills: 5 | Status: SHIPPED | OUTPATIENT
Start: 2024-02-22 | End: 2024-02-23 | Stop reason: SDUPTHER

## 2024-02-22 RX ORDER — METFORMIN HYDROCHLORIDE 500 MG/1
500 TABLET, EXTENDED RELEASE ORAL
Qty: 90 TABLET | Refills: 1 | Status: SHIPPED | OUTPATIENT
Start: 2024-02-22 | End: 2024-02-22 | Stop reason: SDUPTHER

## 2024-02-22 RX ORDER — METFORMIN HYDROCHLORIDE 500 MG/1
1000 TABLET, EXTENDED RELEASE ORAL 2 TIMES DAILY WITH MEALS
Qty: 90 TABLET | Refills: 1 | Status: SHIPPED | OUTPATIENT
Start: 2024-02-22 | End: 2024-02-23 | Stop reason: SDUPTHER

## 2024-02-22 NOTE — TELEPHONE ENCOUNTER
Patient called clarifying how she should take Metformin medication. Patient states it was changes today but she was told 1 tablet two times a day but the it was written as Metformin 500 mg 2 tablets 2 times per day.    Please advise    Thank you

## 2024-02-22 NOTE — PROGRESS NOTES
[facility]  INTERNAL MEDICINE   [place], [place]  Clinic Visit Note  Ying Reese 48 y.o. female   MRN: [id number]    Assessment and Plan    Diagnoses and all orders for this visit:    Prediabetes  Overall worsening trend. Recent A1C 6.0-->6.2. Approaching diabetic threshold for dx  Continue exercise and dietary modification  INCREASE metformin from 500 mg BID --> 1000 mg BID  At next visit, will discuss GLP-1 ags such as ozempic, wegovy, etc.   -     metFORMIN (GLUCOPHAGE-XR) 500 mg 24 hr tablet; Take 2 tablets (1,000 mg total) by mouth 2 (two) times a day with meals  -     Hemoglobin A1C; Future    Encounter for screening mammogram for malignant neoplasm of breast  -    Ordered Mammo screening bilateral w 3d & cad; Future - due 2025. Last mammo WNL one month ago    Vitamin D deficiency  Last level ~20 two years ago. Will recheck to guide supplementation beyond multivitamin  -     Vitamin D 25 hydroxy; Future    Screening for colorectal cancer  Normal in years past. Due now.   -     Cologuard    Anxiety and depression  -     escitalopram (LEXAPRO) 10 mg tablet; Take 1 tablet (10 mg total) by mouth daily at bedtime Half tab  Continue at 5 mg po qd. Patient prefers to keep 10 mg tablets and split instead of switching to 5 mg tabs    Encounter for immunization  -     TDAP VACCINE GREATER THAN OR EQUAL TO 6YO IM    Hypercholesterolemia  -    INCREASE rosuvastatin (CRESTOR) 10 MG tablet po qd --> Take 2 tablets (20 mg total) by mouth daily  -     Lipid panel; Future        Health Maintenance/Care gaps addressed today:  PHQ-2/9 Depression Screening    Little interest or pleasure in doing things: 0 - not at all  Feeling down, depressed, or hopeless: 0 - not at all  Trouble falling or staying asleep, or sleeping too much: 1 - several days  Feeling tired or having little energy: 0 - not at all  Poor appetite or overeatin - several days  Feeling bad about yourself - or that you are a failure or have  "let yourself or your family down: 0 - not at all  Trouble concentrating on things, such as reading the newspaper or watching television: 0 - not at all  Moving or speaking so slowly that other people could have noticed. Or the opposite - being so fidgety or restless that you have been moving around a lot more than usual: 0 - not at all  Thoughts that you would be better off dead, or of hurting yourself in some way: 0 - not at all  PHQ-9 Score: 2  PHQ-9 Interpretation: No or Minimal depression      The 10-year ASCVD risk score (Luis LACEY, et al., 2019) is: 1.2%    Values used to calculate the score:      Age: 48 years      Sex: Female      Is Non- : No      Diabetic: No      Tobacco smoker: No      Systolic Blood Pressure: 118 mmHg      Is BP treated: No      HDL Cholesterol: 48 mg/dL      Total Cholesterol: 212 mg/dL  Lab Results   Component Value Date    HGBA1C 6.2 (H) 02/19/2024      Lab Results   Component Value Date    HEPCAB Non-reactive 11/10/2022      Most Recent Immunizations   Administered Date(s) Administered   • COVID-19 MODERNA VACC 0.5 ML IM 12/30/2021   • Tdap 12/20/2011 01/29/2023 02/19/2024 No components found for: \"HIV1X2\"      BMI Counseling: Body mass index is 37.76 kg/m². The BMI is above normal. Nutrition recommendations include decreasing overall calorie intake, 3-5 servings of fruits/vegetables daily, and consuming healthier snacks. Exercise recommendations include moderate aerobic physical activity for 150 minutes/week and exercising 3-5 times per week.      BMI Counseling: Body mass index is 37.76 kg/m². The BMI is above normal. Exercise recommendations include exercising 3-5 times per week.      Follow up in our clinic in 4 months to f/u prediabetes.    Subjective   CHIEF COMPLAINT:   Chief Complaint   Patient presents with   • Follow-up     Labs 2/19   •      Depression screening       HISTORY OF PRESENT ILLNESS:  Ying Reese is a 48 y.o. female with " "pmhx of obesity, anxiety/depression, HLD, SS-lumbar, preDM presenting to clinic today for routine follow-up.    Patient states she has no major changes in her health since her last visit. She denies fevers/chills, chest pain, shortness of breath, heart palpitations, nausea, vomiting, abdominal pain, weakness, or numbness.    Was instructed to taper off of lexapro 10-->5 mg --> 0 mg, but when patient stopped medication altogether, her anxiety and irritability returned and patient's  noticed a return in her prior mood sx.     Patient has recently started a walking challenge at home indoors, 3-4 x per week.   Each walking session at home, patient walks two miles over 30-35 minutes with her daughter.   Sees OBGYN regularly for pap smears for cervical cancer screening.     Recent labs as follows:  Lab Results   Component Value Date    HGBA1C 6.2 (H) 02/19/2024     CBC, BMP - unremarkable  Lab Results   Component Value Date    CHOLESTEROL 212 (H) 02/19/2024    TRIG 339 (H) 02/19/2024    HDL 48 (L) 02/19/2024    LDLCALC 96 02/19/2024     Cholesterol - largely unchanged  TG - worsened from mid-200s  LDL - improved from 110s        Objective     Vitals:    02/22/24 1105   BP: 118/80   BP Location: Left arm   Patient Position: Sitting   Cuff Size: Large   Pulse: 82   Temp: 98.2 °F (36.8 °C)   TempSrc: Temporal   SpO2: 97%   Weight: 99.8 kg (220 lb)   Height: 5' 4\" (1.626 m)     Physical Exam  Vitals reviewed.   Constitutional:       General: She is not in acute distress.     Appearance: She is obese.   HENT:      Head: Normocephalic and atraumatic.      Mouth/Throat:      Mouth: Mucous membranes are moist.      Pharynx: Oropharynx is clear.   Eyes:      General: No scleral icterus.     Extraocular Movements: Extraocular movements intact.   Cardiovascular:      Rate and Rhythm: Normal rate and regular rhythm.      Heart sounds: No murmur heard.     No friction rub. No gallop.   Pulmonary:      Effort: Pulmonary effort " is normal. No respiratory distress.      Breath sounds: No stridor. No wheezing or rales.   Abdominal:      General: Bowel sounds are normal. There is no distension.      Palpations: There is no mass.      Tenderness: There is no abdominal tenderness. There is no guarding.   Musculoskeletal:         General: Normal range of motion.      Right lower leg: No edema.      Left lower leg: No edema.   Skin:     General: Skin is warm and dry.      Findings: No rash.   Neurological:      Mental Status: She is alert.   Psychiatric:         Mood and Affect: Mood normal.         Behavior: Behavior normal.         Thought Content: Thought content normal.       History     Current Outpatient Medications:   •  escitalopram (LEXAPRO) 10 mg tablet, Take 1 tablet (10 mg total) by mouth daily at bedtime (Patient taking differently: Take 10 mg by mouth daily at bedtime Half tab), Disp: 90 tablet, Rfl: 1  •  metFORMIN (GLUCOPHAGE-XR) 500 mg 24 hr tablet, Take 1 tablet (500 mg total) by mouth daily with dinner, Disp: 90 tablet, Rfl: 1  •  multivitamin-minerals (CENTRUM ADULTS) tablet, Take 1 tablet by mouth if needed, Disp: , Rfl:   •  rosuvastatin (CRESTOR) 10 MG tablet, Take 1 tablet (10 mg total) by mouth daily, Disp: 90 tablet, Rfl: 1  Past Medical History:   Diagnosis Date   • Back disorder    • Depression    • PONV (postoperative nausea and vomiting)      Past Surgical History:   Procedure Laterality Date   • BACK SURGERY      Lumbar( L3)   •  SECTION  ,2008    X2   • CHOLECYSTECTOMY     • ENDOMETRIAL BIOPSY     • EXAMINATION UNDER ANESTHESIA N/A 5/15/2020    Procedure: EXAM UNDER ANESTHESIA (EUA);  Surgeon: Theresa Chapa MD;  [facility];  Service: Gynecology   • INDUCED       times 2   • RI HYSTEROSCOPY BX ENDOMETRIUM&/POLYPC W/WO D&C N/A 5/15/2020    Procedure: DILATATION AND CURETTAGE (D&C) WITH HYSTEROSCOPY;  Surgeon: Theresa Chapa MD;  [facility];  Service: Gynecology   •  AL INSERTION INTRAUTERINE DEVICE IUD N/A 5/15/2020    Procedure: Mirena insertion;  Surgeon: Theresa Chapa MD;  [facility];  Service: Gynecology   • AL LAPS TOTAL HYSTERECT 250 GM/< W/RMVL TUBE/OVARY N/A 5/19/2021    Procedure: ROBOTIC ASSISTED LAPAROSCOPIC HYSTERECTOMY BILATERAL SALPINGECTOMY EUA, REMOVAL OF IUD, LYSIS OF ADHESIONS, CYSTOSCOPY;  Surgeon: Theresa Chapa MD;  [facility];  Service: Gynecology   • TUBAL LIGATION     • WISDOM TOOTH EXTRACTION  10/05/2020     Social History     Socioeconomic History   • Marital status: /Civil Union     Spouse name: Not on file   • Number of children: Not on file   • Years of education: Not on file   • Highest education level: Not on file   Occupational History   • Occupation: currently works full-time   Tobacco Use   • Smoking status: Never   • Smokeless tobacco: Never   Vaping Use   • Vaping status: Never Used   Substance and Sexual Activity   • Alcohol use: Yes     Comment: socially   • Drug use: No   • Sexual activity: Yes     Partners: Male     Birth control/protection: Surgical, Female Sterilization     Comment: Tubal ligations   Other Topics Concern   • Not on file   Social History Narrative    Always uses seat belt    Caffeine use    Drinks coffee    Exercise habits    Feels safe at home    Lives with spouse         Social Determinants of Health     Financial Resource Strain: Not on file   Food Insecurity: Not on file   Transportation Needs: Not on file   Physical Activity: Not on file   Stress: Not on file   Social Connections: Not on file   Intimate Partner Violence: Not on file   Housing Stability: Not on file     Family History   Problem Relation Age of Onset   • Diabetes Mother    • Hypertension Mother    • Sudden death Mother    • Anxiety disorder Family         symptom   • Other Family         breast disorders   • Heart disease Other         cardiac disorder   • Heart disease Other         cardiac disorder   • Depression  Father    • Depression Sister    • Colon cancer Neg Hx    • Ovarian cancer Neg Hx    • Cervical cancer Neg Hx    • Uterine cancer Neg Hx          Allison Rachel MD  [facility] Internal Medicine PGY-3  [address] [facility]  [address] [place] [address]  [phone]      PLEASE NOTE:  This encounter was completed utilizing the [employer] Direct Speech Voice Recognition Software. Grammatical errors, random word insertions, pronoun errors and incomplete sentences are occasional consequences of the system due to software limitations, ambient noise and hardware issues.These may be missed by proof reading prior to affixing electronic signature. Any questions or concerns about the content, text or information contained within the body of this dictation should be directly addressed to the physician for clarification. Please do not hesitate to call me directly if you have any any questions or concerns.

## 2024-02-22 NOTE — TELEPHONE ENCOUNTER
Hi all,    Patient should be taking 1000 mg metformin twice a day, which amounts to 2 tabs in AM, and 2 tabs in PM.     Allison Zarate MD   PGY-3 Internal Medicine  [facility] Resident

## 2024-02-23 RX ORDER — METFORMIN HYDROCHLORIDE 500 MG/1
1000 TABLET, EXTENDED RELEASE ORAL 2 TIMES DAILY WITH MEALS
Qty: 360 TABLET | Refills: 1 | Status: SHIPPED | OUTPATIENT
Start: 2024-02-23

## 2024-02-23 RX ORDER — ESCITALOPRAM OXALATE 10 MG/1
5 TABLET ORAL
Qty: 45 TABLET | Refills: 1 | Status: SHIPPED | OUTPATIENT
Start: 2024-02-23 | End: 2024-08-21

## 2024-02-23 RX ORDER — ROSUVASTATIN CALCIUM 10 MG/1
20 TABLET, COATED ORAL DAILY
Qty: 180 TABLET | Refills: 1 | Status: SHIPPED | OUTPATIENT
Start: 2024-02-23

## 2024-03-04 ENCOUNTER — ANNUAL EXAM (OUTPATIENT)
Dept: OBGYN CLINIC | Facility: MEDICAL CENTER | Age: 49
End: 2024-03-04
Payer: COMMERCIAL

## 2024-03-04 VITALS
DIASTOLIC BLOOD PRESSURE: 82 MMHG | WEIGHT: 225.2 LBS | BODY MASS INDEX: 37.52 KG/M2 | HEIGHT: 65 IN | SYSTOLIC BLOOD PRESSURE: 110 MMHG

## 2024-03-04 DIAGNOSIS — Z87.42 HISTORY OF ABNORMAL CERVICAL PAP SMEAR: ICD-10-CM

## 2024-03-04 DIAGNOSIS — Z01.419 ENCOUNTER FOR ANNUAL ROUTINE GYNECOLOGICAL EXAMINATION: Primary | ICD-10-CM

## 2024-03-04 PROCEDURE — 99396 PREV VISIT EST AGE 40-64: CPT | Performed by: STUDENT IN AN ORGANIZED HEALTH CARE EDUCATION/TRAINING PROGRAM

## 2024-03-04 PROCEDURE — G0476 HPV COMBO ASSAY CA SCREEN: HCPCS | Performed by: STUDENT IN AN ORGANIZED HEALTH CARE EDUCATION/TRAINING PROGRAM

## 2024-03-04 PROCEDURE — G0145 SCR C/V CYTO,THINLAYER,RESCR: HCPCS | Performed by: STUDENT IN AN ORGANIZED HEALTH CARE EDUCATION/TRAINING PROGRAM

## 2024-03-04 NOTE — ASSESSMENT & PLAN NOTE
47 yo here for annual exam    Pap collected, see separate problem  Mammo due 1/25. Recommend breast self awareness  Cologuard negative 1/23  Recommend healthy diet and exercise  Sexually active with some dryness, discussed lubrication options

## 2024-03-04 NOTE — PROGRESS NOTES
Assessment/Plan:    History of abnormal cervical Pap smear  [facility] in 2005  Negative since  S/p hysterectomy  Continue screen to 2030, if negative can stop at that time    Encounter for annual routine gynecological examination  47 yo here for annual exam    Pap collected, see separate problem  Mammo due 1/25. Recommend breast self awareness  Cologuard negative 1/23  Recommend healthy diet and exercise  Sexually active with some dryness, discussed lubrication options     Diagnoses and all orders for this visit:    Encounter for annual routine gynecological examination  -     Liquid-based pap, screening    History of abnormal cervical Pap smear  -     Liquid-based pap, screening          Subjective:      Patient ID: Ying Reese is a 48 y.o. female.    47 yo here for annual exam. No bleeding. No vulvar or breast concerns. No pelvic pain. Sexually active with some dryness, occasionally using lubrication. Daughters are in [employer] ([employer]) and freshman in college ([employer], going to [employer] for spring break).         The following portions of the patient's history were reviewed and updated as appropriate: allergies, current medications, past family history, past medical history, past social history, past surgical history, and problem list.    Review of Systems   Constitutional:  Negative for chills and fever.   HENT:  Negative for ear pain and sore throat.    Eyes:  Negative for pain and visual disturbance.   Respiratory:  Negative for cough and shortness of breath.    Cardiovascular:  Negative for chest pain and palpitations.   Gastrointestinal:  Negative for abdominal pain, constipation, diarrhea, nausea and vomiting.   Genitourinary:  Negative for dyspareunia, dysuria, frequency, hematuria, pelvic pain, urgency, vaginal bleeding, vaginal discharge and vaginal pain.   Musculoskeletal:  Negative for arthralgias and back pain.   Skin:  Negative for color change and rash.   Neurological:   "Negative for seizures and syncope.   All other systems reviewed and are negative.        Objective:      /82 (BP Location: Left arm, Patient Position: Sitting, Cuff Size: Standard)   Ht 5' 5\" (1.651 m)   Wt 102 kg (225 lb 3.2 oz)   LMP 05/15/2020   BMI 37.48 kg/m²          Physical Exam  Constitutional:       General: She is not in acute distress.     Appearance: She is well-developed. She is not diaphoretic.   HENT:      Head: Normocephalic and atraumatic.   Neck:      Thyroid: No thyromegaly.   Pulmonary:      Effort: Pulmonary effort is normal.   Chest:   Breasts:     Breasts are symmetrical.      Right: No inverted nipple, mass, nipple discharge, skin change or tenderness.      Left: No inverted nipple, mass, nipple discharge, skin change or tenderness.   Abdominal:      General: There is no distension.      Palpations: Abdomen is soft. There is no mass.      Tenderness: There is no abdominal tenderness. There is no guarding or rebound.   Genitourinary:     Exam position: Supine.      Labia:         Right: No rash, tenderness, lesion or injury.         Left: No rash, tenderness, lesion or injury.       Vagina: Normal. No vaginal discharge, erythema, tenderness or bleeding.      Uterus: Absent.       Adnexa:         Right: No mass, tenderness or fullness.          Left: No mass, tenderness or fullness.        Comments: Cervix and uterus surgically absent  Musculoskeletal:      Cervical back: Normal range of motion and neck supple.   Lymphadenopathy:      Cervical: No cervical adenopathy.      Upper Body:      Right upper body: No supraclavicular, axillary or pectoral adenopathy.      Left upper body: No supraclavicular, axillary or pectoral adenopathy.           "

## 2024-03-04 NOTE — ASSESSMENT & PLAN NOTE
[facility] in 2005  Negative since  S/p hysterectomy  Continue screen to 2030, if negative can stop at that time

## 2024-03-11 LAB
LAB AP GYN PRIMARY INTERPRETATION: NORMAL
Lab: NORMAL

## 2024-05-01 PROBLEM — Z01.419 ENCOUNTER FOR ANNUAL ROUTINE GYNECOLOGICAL EXAMINATION: Status: RESOLVED | Noted: 2024-03-04 | Resolved: 2024-05-01

## 2024-06-21 ENCOUNTER — NURSE TRIAGE (OUTPATIENT)
Age: 49
End: 2024-06-21

## 2024-06-21 NOTE — TELEPHONE ENCOUNTER
"Reason for Disposition  • Breast pain and cause is not known    Answer Assessment - Initial Assessment Questions  1. SYMPTOM: \"What's the main symptom you're concerned about?\"  (e.g., lump, pain, rash, nipple discharge)      Left breast pain  2. LOCATION: \"Where is the pain located?\"      Left breast  3. ONSET: \"When did pain  start?\"      2 weeks ago  4. PRIOR HISTORY: \"Do you have any history of prior problems with your breasts?\" (e.g., lumps, cancer, fibrocystic breast disease)      H/o breast cyst in past  5. CAUSE: \"What do you think is causing this symptom?\"      unknown  6. OTHER SYMPTOMS: \"Do you have any other symptoms?\" (e.g., fever, breast pain, redness or rash, nipple discharge)      bruising  7. PREGNANCY-BREASTFEEDING: \"Is there any chance you are pregnant?\" \"When was your last menstrual period?\" \"Are you breastfeeding?\"      LMP- hysterectomy    Protocols used: Breast Symptoms-ADULT-[place]    "

## 2024-06-21 NOTE — TELEPHONE ENCOUNTER
Regarding: left breast pain past 2 weeks  ----- Message from Denise QUINTANA sent at 6/21/2024  1:29 PM EDT -----  Patient called and has been having left breast pain for the past 2 weeks.  Please call her back at [phone]. Thank you

## 2024-06-24 ENCOUNTER — OFFICE VISIT (OUTPATIENT)
Dept: OBGYN CLINIC | Facility: MEDICAL CENTER | Age: 49
End: 2024-06-24
Payer: COMMERCIAL

## 2024-06-24 VITALS
DIASTOLIC BLOOD PRESSURE: 70 MMHG | BODY MASS INDEX: 37.32 KG/M2 | WEIGHT: 224 LBS | SYSTOLIC BLOOD PRESSURE: 128 MMHG | HEIGHT: 65 IN

## 2024-06-24 DIAGNOSIS — N64.4 BREAST PAIN, LEFT: Primary | ICD-10-CM

## 2024-06-24 PROCEDURE — 99213 OFFICE O/P EST LOW 20 MIN: CPT | Performed by: CLINICAL NURSE SPECIALIST

## 2024-06-24 NOTE — ASSESSMENT & PLAN NOTE
Pt with 3 wk hx of left focal breast pain. No trauma to area, no nipple d/c. Exam with  Area of max tenderness Left Mid outer quadrant.. No distinct mass palpable- more prominent ridge of denser breast tissue felt.  Will check Imaging

## 2024-06-24 NOTE — PROGRESS NOTES
"Assessment/Plan:       1. Breast pain, left  Assessment & Plan:  Pt with 3 wk hx of left focal breast pain. No trauma to area, no nipple d/c. Exam with  Area of max tenderness Left Mid outer quadrant.. No distinct mass palpable- more prominent ridge of denser breast tissue felt.  Will check Imaging     Orders:  -     US breast left limited (diagnostic); Future; Expected date: 06/24/2024  -     Mammo diagnostic left w 3d & cad; Future          Subjective:      Patient ID: Ying Reese is a 48 y.o. female. She is here for Breast Problem (Patient is having pain in her left breast/)    HPI    Pain present x 3 wks.   Denies trauma or bug bite to area.  Denies skin changes  Does have dense breast tissue and doesn't think theres a mass but not sure.  Denies nipple d/c    No new meds or medical problems.   Denies recent weight gain or loss.            The following portions of the patient's history were reviewed and updated as appropriate: allergies, current medications, past family history, past medical history, past social history, past surgical history, and problem list.    Review of Systems  See HPI for pertinent positives          Objective:    /70 (BP Location: Left arm, Patient Position: Sitting, Cuff Size: Large)   Ht 5' 5\" (1.651 m)   Wt 102 kg (224 lb)   LMP 05/15/2020   BMI 37.28 kg/m²      Physical Exam  Constitutional:       General: She is not in acute distress.     Appearance: Normal appearance.   Genitourinary:      Genitourinary Comments: Pelvic exam deferred     Breasts:     Right: No mass, nipple discharge, skin change or tenderness.      Left: Mass and tenderness present. No nipple discharge or skin change.   Cardiovascular:      Rate and Rhythm: Normal rate.   Pulmonary:      Effort: Pulmonary effort is normal.   Chest:       Abdominal:      Palpations: Abdomen is soft.   Musculoskeletal:         General: Normal range of motion.   Neurological:      Mental Status: She is alert and " oriented to person, place, and time.   Skin:     General: Skin is warm and dry.   Psychiatric:         Mood and Affect: Mood normal.         Behavior: Behavior normal.

## 2024-07-01 DIAGNOSIS — L65.9 HAIR LOSS: ICD-10-CM

## 2024-07-01 DIAGNOSIS — R73.03 PREDIABETES: ICD-10-CM

## 2024-07-01 DIAGNOSIS — F32.A ANXIETY AND DEPRESSION: ICD-10-CM

## 2024-07-01 DIAGNOSIS — E78.00 HYPERCHOLESTEROLEMIA: ICD-10-CM

## 2024-07-01 DIAGNOSIS — F41.9 ANXIETY AND DEPRESSION: ICD-10-CM

## 2024-07-01 RX ORDER — ESCITALOPRAM OXALATE 10 MG/1
5 TABLET ORAL
Qty: 45 TABLET | Refills: 1 | Status: SHIPPED | OUTPATIENT
Start: 2024-07-01 | End: 2024-12-28

## 2024-07-01 RX ORDER — ROSUVASTATIN CALCIUM 10 MG/1
20 TABLET, COATED ORAL DAILY
Qty: 180 TABLET | Refills: 1 | Status: SHIPPED | OUTPATIENT
Start: 2024-07-01

## 2024-07-01 RX ORDER — METFORMIN HYDROCHLORIDE 500 MG/1
1000 TABLET, EXTENDED RELEASE ORAL 2 TIMES DAILY WITH MEALS
Qty: 360 TABLET | Refills: 1 | Status: SHIPPED | OUTPATIENT
Start: 2024-07-01

## 2024-07-01 NOTE — TELEPHONE ENCOUNTER
Medication:     escitalopram (LEXAPRO) 10 mg tablet       Dose/Frequency:     Take 0.5 tablets (5 mg total) by mouth daily at bedtime       Quantity: 45 tablet     Pharmacy: [facility] [address] [place] [address]     Office:   [x] PCP/Provider -   [] Speciality/Provider -     Does the patient have enough for 3 days?   [x] Yes   [] No - Send as HP to POD    Medication: metFORMIN (GLUCOPHAGE-XR) 500 mg 24 hr tablet     Dose/Frequency: Take 2 tablets (1,000 mg total) by mouth 2 (two) times a day with meals     Quantity:  360 tablet     Pharmacy: [facility] [address] [place] [address]     Office:   [x] PCP/Provider -   [] Speciality/Provider -     Does the patient have enough for 3 days?   [x] Yes   [] No - Send as HP to POD    Medication: rosuvastatin (CRESTOR) 10 MG tablet     Dose/Frequency: Take 2 tablets (20 mg total) by mouth daily     Quantity: 180 tablet     Pharmacy: [facility] [address] [place] [address]     Office:   [x] PCP/Provider -   [] Speciality/Provider -     Does the patient have enough for 3 days?   [x] Yes   [] No - Send as HP to POD

## 2024-09-26 DIAGNOSIS — Z00.6 ENCOUNTER FOR EXAMINATION FOR NORMAL COMPARISON OR CONTROL IN CLINICAL RESEARCH PROGRAM: ICD-10-CM

## 2024-10-14 ENCOUNTER — APPOINTMENT (OUTPATIENT)
Dept: LAB | Facility: IMAGING CENTER | Age: 49
End: 2024-10-14
Payer: COMMERCIAL

## 2024-10-14 DIAGNOSIS — R73.03 PREDIABETES: ICD-10-CM

## 2024-10-14 DIAGNOSIS — E55.9 VITAMIN D DEFICIENCY: ICD-10-CM

## 2024-10-14 DIAGNOSIS — Z00.6 ENCOUNTER FOR EXAMINATION FOR NORMAL COMPARISON OR CONTROL IN CLINICAL RESEARCH PROGRAM: ICD-10-CM

## 2024-10-14 DIAGNOSIS — E78.00 HYPERCHOLESTEROLEMIA: ICD-10-CM

## 2024-10-14 LAB
25(OH)D3 SERPL-MCNC: 34.7 NG/ML (ref 30–100)
CHOLEST SERPL-MCNC: 222 MG/DL
EST. AVERAGE GLUCOSE BLD GHB EST-MCNC: 128 MG/DL
HBA1C MFR BLD: 6.1 %
HDLC SERPL-MCNC: 54 MG/DL
LDLC SERPL CALC-MCNC: 117 MG/DL (ref 0–100)
NONHDLC SERPL-MCNC: 168 MG/DL
TRIGL SERPL-MCNC: 254 MG/DL

## 2024-10-14 PROCEDURE — 36415 COLL VENOUS BLD VENIPUNCTURE: CPT

## 2024-10-14 PROCEDURE — 80061 LIPID PANEL: CPT

## 2024-10-14 PROCEDURE — 82306 VITAMIN D 25 HYDROXY: CPT

## 2024-10-14 PROCEDURE — 83036 HEMOGLOBIN GLYCOSYLATED A1C: CPT

## 2024-10-15 ENCOUNTER — OFFICE VISIT (OUTPATIENT)
Dept: INTERNAL MEDICINE CLINIC | Age: 49
End: 2024-10-15
Payer: COMMERCIAL

## 2024-10-15 VITALS
BODY MASS INDEX: 35.99 KG/M2 | WEIGHT: 216 LBS | OXYGEN SATURATION: 95 % | HEART RATE: 96 BPM | SYSTOLIC BLOOD PRESSURE: 116 MMHG | HEIGHT: 65 IN | TEMPERATURE: 97.9 F | DIASTOLIC BLOOD PRESSURE: 68 MMHG

## 2024-10-15 DIAGNOSIS — R73.03 PREDIABETES: ICD-10-CM

## 2024-10-15 DIAGNOSIS — F33.9 DEPRESSION, RECURRENT (HCC): ICD-10-CM

## 2024-10-15 DIAGNOSIS — E78.00 HYPERCHOLESTEROLEMIA: Primary | ICD-10-CM

## 2024-10-15 PROCEDURE — 99214 OFFICE O/P EST MOD 30 MIN: CPT | Performed by: INTERNAL MEDICINE

## 2024-10-15 RX ORDER — ROSUVASTATIN CALCIUM 10 MG/1
40 TABLET, COATED ORAL DAILY
Qty: 180 TABLET | Refills: 2 | Status: SHIPPED | OUTPATIENT
Start: 2024-10-15

## 2024-10-15 NOTE — ASSESSMENT & PLAN NOTE
Patient has a history of depression.  Patient has been taking Lexapro 5 mg at bedtime.  Patient states that she is stable on her medication.  Plan is to continue on this dose of lexapro.

## 2024-10-15 NOTE — ASSESSMENT & PLAN NOTE
Patient has a history of prediabetes, with most recent HbA1c of 6.1. Patient has been watching her diet, but has not had a lot of change in her weight. Patient reports that she takes her metformin daily. She is currently taking 1000 mg BID of Metformin. Patient was concerned that her A1c has not changed despite lifestyle and medication management. Decision was made to start ozempic, d/t elevated BMI of 35.94 and prediabetes. Patient was informed to let us know if her ozempic is not covered by her insurance.     Plan:   - patient education on diabetic diet  - patient education on hypoglycemia   - patient asked to log fasting glucose every other day and monitor for signs of hypoglycemia.   - start taking ozempic 0.25 mg weekly  Orders:    semaglutide, 0.25 or 0.5 mg/dose, (Ozempic, 0.25 or 0.5 MG/DOSE,) 2 mg/3 mL injection pen; 0.25 mg under the skin every 7 days for 4 doses (28 days), THEN 0.5 mg under the skin every 7 days    Glucometer    Glucometer test strips    Hemoglobin A1C; Future

## 2024-10-15 NOTE — ASSESSMENT & PLAN NOTE
Patient has extensive family history of hypercholesterolemia.  Patient has been taking rosuvastatin 20 mg daily.  Patient has not had any side effects from medication at this point.  Patient had repeat lipid panel which showed elevated cholesterol to 222, LDL to 116, Triglycerides 254, HDL 54.  This is increased from her last labs in February.  Patient is concerned due to her family history.  Patient's ACS COPD risk score was 1%.  Due to extensive family history, decision made to increase rosuvastatin to 40 mg daily.  Patient is in agreement with this medication change.  Patient was informed to let us know if there are any changes to her side effects after starting this medication.    Plan:   - repeat lipid panel in three months prior to follow up  - Increase rosuvastatin to 40 mg daily    Orders:    Lipid panel; Future    Hepatic function panel; Future    rosuvastatin (CRESTOR) 10 MG tablet; Take 4 tablets (40 mg total) by mouth daily

## 2024-10-15 NOTE — PROGRESS NOTES
Adult Annual Physical  Name: Ying Reese      : 1975      MRN: [id number]  Encounter Provider: Stacy Kennedy DO  Encounter Date: 10/15/2024   Encounter department: [facility] [place]    Assessment & Plan  Hypercholesterolemia  Patient has extensive family history of hypercholesterolemia.  Patient has been taking rosuvastatin 20 mg daily.  Patient has not had any side effects from medication at this point.  Patient had repeat lipid panel which showed elevated cholesterol to 222, LDL to 116, Triglycerides 254, HDL 54.  This is increased from her last labs in February.  Patient is concerned due to her family history.  Patient's ACS COPD risk score was 1%.  Due to extensive family history, decision made to increase rosuvastatin to 40 mg daily.  Patient is in agreement with this medication change.  Patient was informed to let us know if there are any changes to her side effects after starting this medication.    Plan:   - repeat lipid panel in three months prior to follow up  - Increase rosuvastatin to 40 mg daily    Orders:    Lipid panel; Future    Hepatic function panel; Future    rosuvastatin (CRESTOR) 10 MG tablet; Take 4 tablets (40 mg total) by mouth daily    Prediabetes  Patient has a history of prediabetes, with most recent HbA1c of 6.1. Patient has been watching her diet, but has not had a lot of change in her weight. Patient reports that she takes her metformin daily. She is currently taking 1000 mg BID of Metformin. Patient was concerned that her A1c has not changed despite lifestyle and medication management. Decision was made to start ozempic, d/t elevated BMI of 35.94 and prediabetes. Patient was informed to let us know if her ozempic is not covered by her insurance.     Plan:   - patient education on diabetic diet  - patient education on hypoglycemia   - patient asked to log fasting glucose every other day and monitor for signs of hypoglycemia.   - start  taking ozempic 0.25 mg weekly  Orders:    semaglutide, 0.25 or 0.5 mg/dose, (Ozempic, 0.25 or 0.5 MG/DOSE,) 2 mg/3 mL injection pen; 0.25 mg under the skin every 7 days for 4 doses (28 days), THEN 0.5 mg under the skin every 7 days    Glucometer    Glucometer test strips    Hemoglobin A1C; Future    Depression, recurrent (HCC)  Patient has a history of depression.  Patient has been taking Lexapro 5 mg at bedtime.  Patient states that she is stable on her medication.  Plan is to continue on this dose of lexapro.          BMI 35.0-35.9,adult  See Prediabetes plan  Orders:    semaglutide, 0.25 or 0.5 mg/dose, (Ozempic, 0.25 or 0.5 MG/DOSE,) 2 mg/3 mL injection pen; 0.25 mg under the skin every 7 days for 4 doses (28 days), THEN 0.5 mg under the skin every 7 days    Immunizations and preventive care screenings were discussed with patient today. Appropriate education was printed on patient's after visit summary.    Counseling:  Exercise: the importance of regular exercise/physical activity was discussed. Recommend exercise 3-5 times per week for at least 30 minutes.          History of Present Illness       Ying is a 48-year-old female history of prediabetes, hypercholesterolemia, depression, prior history of vitamin D deficiency, status post hysterectomy.  She states she has been feeling well.  She has no concerns for acute problems at this time.  Patient is here to follow-up on her prediabetes as well as her hypercholesterolemia.  Patient has an extensive family history of hypercholesterolemia.  Patient's ASCVD risk score is 1%.  Patient has been taking rosuvastatin 20 mg daily.  Patient has repeat lipid panels prior to her visit.  Patient was informed that her lipids are still elevated and we are going to increase her rosuvastatin.  Patient was also concerned that she is still in the prediabetes range.  Patient has been taking metformin 1000 mg twice a day with all side effects.  Patient states that she has made  "lifestyle and dietary modifications but has had difficulty losing weight.  Patient is concerned that she will develop diabetes.  Patient is informed that we are going to try to have her insurance cover for Ozempic.  Patient was given educational materials as well as informed on a diabetic diet as well as hypoglycemia.  Patient will follow-up in 3 months after starting Ozempic.  Patient was informed to have lab work completed prior to her next visit.  Adult Annual Physical:  Patient presents for annual physical.     Diet and Physical Activity:  - Diet/Nutrition: well balanced diet.  - Exercise: no formal exercise and walking.    General Health:  - Sleep: sleeps well.    /GYN Health:  - Follows with GYN: yes.     Review of Systems   Constitutional:  Negative for chills, fatigue, fever and unexpected weight change.   Respiratory:  Negative for cough, chest tightness, shortness of breath and wheezing.    Cardiovascular:  Negative for chest pain and palpitations.   Gastrointestinal:  Negative for abdominal distention, constipation and diarrhea.   Genitourinary:  Negative for difficulty urinating.   Musculoskeletal:  Negative for arthralgias.         Objective     /68 (BP Location: Left arm, Patient Position: Sitting, Cuff Size: Standard)   Pulse 96   Temp 97.9 °F (36.6 °C)   Ht 5' 5\" (1.651 m)   Wt 98 kg (216 lb)   LMP 05/15/2020   SpO2 95%   BMI 35.94 kg/m²     Physical Exam  Constitutional:       General: She is not in acute distress.     Appearance: Normal appearance. She is obese. She is not ill-appearing or diaphoretic.   HENT:      Head: Normocephalic and atraumatic.      Mouth/Throat:      Mouth: Mucous membranes are moist.   Cardiovascular:      Rate and Rhythm: Normal rate and regular rhythm.      Pulses: Normal pulses.      Heart sounds: Normal heart sounds.   Pulmonary:      Effort: Pulmonary effort is normal.      Breath sounds: Normal breath sounds.   Abdominal:      General: Abdomen is flat. " Bowel sounds are normal. There is no distension.      Palpations: Abdomen is soft.      Tenderness: There is no abdominal tenderness.   Musculoskeletal:      Right lower leg: No edema.      Left lower leg: No edema.   Skin:     General: Skin is warm and dry.   Neurological:      Mental Status: She is alert and oriented to person, place, and time. Mental status is at baseline.

## 2024-10-15 NOTE — ASSESSMENT & PLAN NOTE
See Prediabetes plan  Orders:    semaglutide, 0.25 or 0.5 mg/dose, (Ozempic, 0.25 or 0.5 MG/DOSE,) 2 mg/3 mL injection pen; 0.25 mg under the skin every 7 days for 4 doses (28 days), THEN 0.5 mg under the skin every 7 days

## 2024-10-16 ENCOUNTER — TELEPHONE (OUTPATIENT)
Age: 49
End: 2024-10-16

## 2024-10-16 DIAGNOSIS — R73.03 PREDIABETES: Primary | ICD-10-CM

## 2024-10-16 NOTE — TELEPHONE ENCOUNTER
Patient called in to say script for glucometer and testing supplies was not received by pharmacy after OV yesterday. She did not receive literature with glucose ranges and information as well. If those can be sent to her My chart. I verbally went over normal ranges with her on phone. Pharmacy correct on file. Thank you

## 2024-10-17 RX ORDER — LANCETS 33 GAUGE
EACH MISCELLANEOUS
Qty: 100 EACH | Refills: 1 | Status: SHIPPED | OUTPATIENT
Start: 2024-10-17

## 2024-10-17 RX ORDER — BLOOD-GLUCOSE METER
EACH MISCELLANEOUS
Qty: 1 KIT | Refills: 0 | Status: SHIPPED | OUTPATIENT
Start: 2024-10-17

## 2024-10-17 RX ORDER — BLOOD SUGAR DIAGNOSTIC
STRIP MISCELLANEOUS
Qty: 100 STRIP | Refills: 1 | Status: SHIPPED | OUTPATIENT
Start: 2024-10-17

## 2024-10-17 NOTE — TELEPHONE ENCOUNTER
Hello, I have approved the orders for her glucometer. Ying was sent hand out information on diet and hypoglycemia in her after visit summary yesterday. Thank you

## 2024-10-17 NOTE — TELEPHONE ENCOUNTER
OFFICE VISIT      Patient: Theresa Mcghee   : 1962 MRN: [id number]    SUBJECTIVE:  Chief Complaint   Patient presents with    Physical     No concerns        A 61 year old female is here for an annual physical examination.    The recording was initiated after a verbal consent was obtained from the patient to record this visit for documentation in their clinical record.    HISTORY OF PRESENT ILLNESS:  Historian: Self.     Well woman exam:  Screening labs: Her HbA1c has improved from 6% to 5.7%.  Diet and exercise: Patient is actively gaining weight. Has gained a lot of weight as she has been munching, lying around, and moping. She wants to lose weight. She knows she should improve her diet.  Dermatology screening: States that she scratched off a mole unintentionally on her back the day before yesterday as she thought that it was a tick that caused her itching.   Medications: Reviewed and updated. Takes calcium and vitamin E.     Struggling currently emotionally due to poor health of her brother--has metastatic cancer and kidney failure.    Fibromyalgia: Reports flaring up of fibromyalgia symptoms. She also reports flaring up of basal joint pain. States that the pain radiates up all the way from her arm into her neck. Has been using Voltaren gel which helps in relieving the pain. She also reports left knee pain; however she never underwent surgery for it in the past. States she weaned herself off of Gabapentin as she is afraid that it may affect her kidney function. She is planning to do pain management through exercise and with medication that wouldn't have an effect on her kidneys.     Postoperative hypothyroidism; History of thyroid cancer: Currently, on Synthroid. She follows up with an endocrinologist, Dr. Donaldson.     Stage 3 chronic kidney disease, unspecified whether stage 3a or 3b CKD  (CMD); Hypocalcemia  Previous labs revealed abnormal kidney function tests. States her kidney function tests are  Left message on machine for patient    getting worse and not getting any better. Takes calcium. Has not been seen by nephrology for a while. She would like a transition to Dr. Bowles.    Mixed hyperlipidemia: Her cholesterol has been elevated because of stress and because of snacking on everything.     Additional comments:   Takes Vitamin E which helps to reduce breast pain. Notes that she had mammogram which was normal.      PAST MEDICAL HISTORY:  Past Medical History:   Diagnosis Date    Arthritis     right thumb    Cataracts, bilateral     Cervical cancer  (CMD)     2012    Chronic kidney disease     CKD 3    Chronic urticaria     Depression     Drooping eyelid disease     Dysplasia of cervix     EP (ectopic pregnancy) (CMD)     Fibromyalgia     Hyperthyroidism     Osteoarthritis     Rectocele     Status post total right knee replacement 10/24/2017    Thyroid cancer  (CMD) 2001    Vitamin D deficiency      MEDICATIONS:  Current Outpatient Medications   Medication Sig    amoxicillin (AMOXIL) 500 MG capsule Take 4 capsules by mouth 1 time as needed (1 hour prior to dental work).    Synthroid 150 MCG tablet Take one tablet daily Monday to Friday and none on Saturday and Sunday (5 days a week ).    vitamin E 200 units capsule Take 800 Units by mouth daily.    propRANolol (INDERAL) 10 MG tablet Take 1 tablet by mouth 3 times daily as needed (Anxiety).    diclofenac (VOLTAREN) 1 % gel APPLY TO AFFECTED AREA 3-4 GRAMS THREE TIMES DAILY AS NEEDED    ergocalciferol (DRISDOL) 1.25 mg (50,000 units) capsule Take 1 capsule by mouth 1 day a week. Take 1 tablet weekly    pantoprazole (PROTONIX) 40 MG tablet Take 1 tablet by mouth daily.    oMALizumab (XOLAIR) 150 MG/ML Solution Prefilled Syringe Inject 2 syringes into the skin every 28 days.    EPINEPHrine 0.3 MG/0.3ML auto-injector Inject 0.3 mLs into the muscle 1 time as needed for Anaphylaxis.    Calcium Carb-Cholecalciferol 600-25 MG-MCG Tab Take 1 tablet by mouth in the morning and 1 tablet in the evening. TAKE  ON AN EMPTY STOMACH    cetirizine (Allergy Relief/Indoor/Outdoor) 10 MG tablet Take 1 tablet by mouth daily.    albuterol (ProAir HFA) 108 (90 Base) MCG/ACT inhaler Inhale 2 puffs into the lungs every 4 hours as needed for Shortness of Breath.    olopatadine (PATANOL) 0.1 % ophthalmic solution Place 1 drop into both eyes 2 times daily as needed for Allergies.    tiZANidine (ZANAFLEX) 4 MG tablet Take 1-2 tablets by mouth every 8 hours as needed (muscle spasm).     No current facility-administered medications for this visit.     ALLERGIES:  ALLERGIES:  No Known Allergies  PAST SURGICAL HISTORY:  Past Surgical History:   Procedure Laterality Date    ------------other-------------      Thumb surgery - tendon repair    Bd dexa axial skeleton  04/19/2011    Breast biopsy  07/06/2011    benign fibrosis    Capsulotomy  9/5/13 [facility] OD    Dr. Angel Arrington    Cataract extraction w/ intraocular lens implant  12/01/10 OD    Dr. Krystian Foster    Cataract extraction w/ intraocular lens implant  8/7/13 OS    Dr. Angel Arrington    Colonoscopy  10/21/15    Ectopic pregnancy surgery      Hysterectomy  08/25/2010    Joint replacement      Repair of rectocele  08/23/2011    Sesamoidectomy, thumb/finger  03/14/2013    arthroplasty carpocarpal or carpometacarpal joint without implant    Thyroidectomy  2001    Total hip arthroplasty Left 10/06/2020    Total knee arthroplasty Right 10/16/2017     FAMILY HISTORY:  Family History   Problem Relation Age of Onset    Heart disease Mother 63    Hypertension Mother     Thyroid Mother         thyroid disease    Sleep Apnea Father     Diabetes Brother     Sleep Apnea Brother     Cancer, Breast Maternal Grandmother     Cancer, Breast Maternal Cousin     Diabetes Other         cousin     SOCIAL HISTORY:  Social History     Tobacco Use   Smoking Status Former    Current packs/day: 0.00    Average packs/day: (0.4 ttl pk-yrs)    Types: Cigarettes    Start date: 7/8/1984    Quit date: 12/2/2022     Years since quittin.5   Smokeless Tobacco Never     Social History     Substance and Sexual Activity   Alcohol Use Yes    Comment: 4 glasses of wine a year        Review of Systems   Constitutional: As Per HPI.  Musculoskeletal: As Per HPI.  Skin: As Per HPI.  Allergic/Immunologic: As Per HPI.  Neurological: As Per HPI.    OBJECTIVE:  Vitals:    24 1338   BP: 124/82   BP Location: LUE - Left upper extremity   Patient Position: Sitting   Cuff Size: Regular   Pulse: 80       There is no height or weight on file to calculate BMI.    Physical Exam  Constitutional: alert, in no acute distress and current vital signs reviewed.   Head and Face: atraumatic, no deformities, normocephalic, normal facies.  Eyes: no discharge, normal conjunctiva, no eyelid swelling, no ptosis and the sclerae were normal. pupils equal, round and reactive to light and accommodation, conjugate gaze and extraocular movements were intact.   ENT: normal appearing outer ear, normal appearing nose. examination of the tympanic membrane showed normal landmarks, normal appearing external canal. nasal mucosa moist and pink, no nasal discharge. normal lips. oral mucosa pink and moist, no oral lesions.   Neck: normal appearing neck, supple neck and no mass was seen. thyroid not enlarged and no thyroid nodules.   Lymphatic: no lymphadenopathy.   Pulmonary: no respiratory distress, normal respiratory rate and effort and no accessory muscle use. breath sounds clear to auscultation bilaterally.   Cardiovascular: normal rate, no murmurs were heard, regular rhythm, normal S1 and normal S2. edema was not present in the lower extremities.   Abdomen: soft, nontender, nondistended, normal bowel sounds and no abdominal mass. no hepatomegaly and no splenomegaly. no umbilical hernia was discovered.   Musculoskeletal: normal gait. no musculoskeletal erythema was seen, no joint swelling seen and no joint tenderness was elicited. no scoliosis. normal range of  motion. there was no joint instability noted. muscle strength and tone were normal.   Neurologic: cranial nerves grossly intact. normal DTRs. no sensory deficits noted. no coordination deficits. normal gait. muscle strength and tone were normal.   Psychiatric: oriented to person, oriented to place and oriented to time. alert and awake, interactive and mood/affect were appropriate. judgment not impaired. normal attention span. short term memory intact.   Skin, Hair, Nails: normal skin color and pigmentation and no rash. no foot ulcers and no skin ulcer was seen. normal skin turgor. no clubbing or cyanosis of the fingernails. She has seborrheic keratosis.   Breast exam: Patient declined breast exam.     DIAGNOSTIC STUDIES:  LAB RESULTS:  No visits with results within 1 Month(s) from this visit.   Latest known visit with results is:   Lab Services on 03/05/2024   Component Date Value Ref Range Status    Fasting Status 03/05/2024 12  0 - 999 Hours Final    Sodium 03/05/2024 142  135 - 145 mmol/L Final    Potassium 03/05/2024 4.5  3.4 - 5.1 mmol/L Final    Chloride 03/05/2024 106  97 - 110 mmol/L Final    Carbon Dioxide 03/05/2024 30  21 - 32 mmol/L Final    Anion Gap 03/05/2024 11  7 - 19 mmol/L Final    Glucose 03/05/2024 115 (H)  70 - 99 mg/dL Final    BUN 03/05/2024 20  6 - 20 mg/dL Final    Creatinine 03/05/2024 1.39 (H)  0.51 - 0.95 mg/dL Final    Glomerular Filtration Rate 03/05/2024 43 (L)  >=60 Final    BUN/Cr 03/05/2024 14  7 - 25 Final    Calcium 03/05/2024 8.7  8.4 - 10.2 mg/dL Final    Bilirubin, Total 03/05/2024 0.6  0.2 - 1.0 mg/dL Final    GOT/AST 03/05/2024 36  <=37 Units/L Final    GPT/ALT 03/05/2024 72 (H)  <64 Units/L Final    Alkaline Phosphatase 03/05/2024 65  45 - 117 Units/L Final    Albumin 03/05/2024 3.9  3.6 - 5.1 g/dL Final    Protein, Total 03/05/2024 6.5  6.4 - 8.2 g/dL Final    Globulin 03/05/2024 2.6  2.0 - 4.0 g/dL Final    A/G Ratio 03/05/2024 1.5  1.0 - 2.4 Final    Cholesterol  03/05/2024 226 (H)  <=199 mg/dL Final    Triglycerides 03/05/2024 165 (H)  <=149 mg/dL Final    HDL 03/05/2024 41 (L)  >=50 mg/dL Final    LDL 03/05/2024 152 (H)  <=129 mg/dL Final    Non-HDL Cholesterol 03/05/2024 185  mg/dL Final    Cholesterol/ HDL Ratio 03/05/2024 5.5 (H)  <=4.4 Final    Hemoglobin A1C 03/05/2024 5.7 (H)  4.5 - 5.6 % Final    Vitamin B12 03/05/2024 454  211 - 911 pg/mL Final    Folate 03/05/2024 13.0  >=5.5 ng/mL Final    GGTP 03/05/2024 32  5 - 55 Units/L Final       ASSESSMENT AND PLAN:  This is a 61 year old female who presents with :  1. Well woman exam    2. Fibromyalgia    3. Postoperative hypothyroidism    4. History of thyroid cancer    5. Stage 3 chronic kidney disease, unspecified whether stage 3a or 3b CKD  (CMD)    6. Hypocalcemia    7. Mixed hyperlipidemia        Orders Placed This Encounter    Comprehensive Metabolic Panel    Lipid Panel With Reflex    Thyroid Stimulating Hormone Reflex    SERVICE TO PHYSICAL THERAPY    SERVICE TO NEPHROLOGY    amoxicillin (AMOXIL) 500 MG capsule    Synthroid 150 MCG tablet    propRANolol (INDERAL) 10 MG tablet    diclofenac (VOLTAREN) 1 % gel       Plan:  Well woman exam:  Reviewed and discussed the previous labs.  Reviewed and reconciled the medication list.  Declines immunizations for now.  She is up-to-date with screening.  Encouraged improved diet and regular exercise.  Advised not to apply Voltaren gel on the spot where she scratched off the mole as it's an open sore.    Fibromyalgia:  Referred to Physical therapy for exercise rehab program for this.   Advised to take Tylenol as needed for pain.   Recommended following a low sodium diet.  Advised to stay away from processed and salty foods.    Postoperative hypothyroidism; History of thyroid cancer:  Ordered Thyroid Stimulating Hormone Reflex; Future   She does see Dr. Donaldson.  Synthroid was reordered. Doing well.   Continue Synthroid 150 MCG tablet; Take one tablet daily Monday to Friday  and none on Saturday and Sunday (5 days a week ). Refills provided.   Informed that she will be due for thyroid check up in six months.     Stage 3 chronic kidney disease, unspecified whether stage 3a or 3b CKD  (CMD); Hypocalcemia:  Reviewed and discussed the previous labs.  Has not been seen by nephrology for a while.   She would like a transition to Dr. Bowles.  Referral to Nephrology provided.     Mixed hyperlipidemia:  Diet controlled.  Ordered Comprehensive Metabolic Panel; Future, Lipid Panel With Reflex; Future  Continue to work on diet and exercise.  Encouraged to eat fruits and vegetables.   LDL increased this time.     Other orders  Continue amoxicillin (AMOXIL) 500 MG capsule; Take 4 capsules by mouth 1 time as needed (1 hour prior to dental work). Refills provided.  Continue propranolol (INDERAL) 10 MG tablet; Take 1 tablet by mouth 3 times daily as needed (Anxiety). Refills provided.  Continue diclofenac (VOLTAREN) 1 % gel; APPLY TO AFFECTED AREA 3-4 GRAMS THREE TIMES DAILY AS NEEDED. Refills provided.    Additional plan:   Breast pain:  Recommended decreasing the dose of vitamin E to 400 mg one tablet a day. Prescription updated.    Follow up in six months with fasting labs prior.     Refer to orders.  Medical compliance with plan discussed and risks of non-compliance reviewed.  Patient education completed on disease process, etiology & prognosis.  Proper usage and side effects of medications reviewed & discussed.  Patient understands and agrees with the plan.  Return to clinic as clinically indicated as discussed with patient who verbalized understanding of the plan and is in agreement with the plan.    Return in about 6 months (around 12/3/2024) for Med check.    I, Vern Salazar, have created a visit summary document based on the audio recording between Dr. Bela Amaya MD and this patient for the physician to review, edit as needed, and authenticate.  Creation Date: 6/4/2024     I have reviewed  and edited the visit summary above and attest that it is accurate.

## 2024-10-25 LAB
APOB+LDLR+PCSK9 GENE MUT ANL BLD/T: NOT DETECTED
BRCA1+BRCA2 DEL+DUP + FULL MUT ANL BLD/T: NOT DETECTED
MLH1+MSH2+MSH6+PMS2 GN DEL+DUP+FUL M: NOT DETECTED

## 2025-01-06 DIAGNOSIS — R73.03 PREDIABETES: ICD-10-CM

## 2025-01-08 RX ORDER — METFORMIN HYDROCHLORIDE 500 MG/1
TABLET, EXTENDED RELEASE ORAL
Qty: 360 TABLET | Refills: 1 | Status: SHIPPED | OUTPATIENT
Start: 2025-01-08

## 2025-01-27 ENCOUNTER — OFFICE VISIT (OUTPATIENT)
Dept: INTERNAL MEDICINE CLINIC | Age: 50
End: 2025-01-27
Payer: COMMERCIAL

## 2025-01-27 VITALS
HEART RATE: 80 BPM | SYSTOLIC BLOOD PRESSURE: 110 MMHG | WEIGHT: 209 LBS | DIASTOLIC BLOOD PRESSURE: 78 MMHG | OXYGEN SATURATION: 97 % | BODY MASS INDEX: 34.82 KG/M2 | HEIGHT: 65 IN | TEMPERATURE: 97.1 F

## 2025-01-27 DIAGNOSIS — R73.03 PREDIABETES: Primary | ICD-10-CM

## 2025-01-27 DIAGNOSIS — Z12.31 ENCOUNTER FOR SCREENING MAMMOGRAM FOR BREAST CANCER: ICD-10-CM

## 2025-01-27 PROCEDURE — 99214 OFFICE O/P EST MOD 30 MIN: CPT | Performed by: INTERNAL MEDICINE

## 2025-01-27 NOTE — PROGRESS NOTES
Name: Ying Reese      : 1975      MRN: [id number]  Encounter Provider: Azar Lucas MD  Encounter Date: 2025   Encounter department: [facility]  :  Assessment & Plan  Encounter for screening mammogram for breast cancer         BMI 35.0-35.9,adult    Orders:    semaglutide, 1 mg/dose, (Ozempic) 4 mg/3 mL injection pen; Inject 0.75 mL (1 mg total) under the skin once a week    Prediabetes    Orders:    semaglutide, 1 mg/dose, (Ozempic) 4 mg/3 mL injection pen; Inject 0.75 mL (1 mg total) under the skin once a week           History of Present Illness   This is a very pleasant 49 years young lady who is on a initial dose of Ozempic for increased BMI and also for prediabetes so far she is tolerating the medication except when she takes her her dose on Saturday all day she gets little bit of an upset stomach nausea and some diarrhea but she is able to tolerate rest of the week is unremarkable I will increase the dose although we did get a good results of her weight she lost about 9 pounds she may be able to lose more weight and we will follow-up with her hemoglobin A1c and the lipid panel and LFTs physical examination is unremarkable she does not have any other issues right now review of system is unremarkable blood pressure is excellent she did not go for her blood test she promised that she will go this week or early next week it will be fasting blood test      Review of Systems   Constitutional:  Negative for chills and fatigue.   HENT:  Negative for congestion, ear pain, hearing loss, postnasal drip, sinus pressure, sore throat and voice change.    Eyes:  Negative for pain, discharge and visual disturbance.   Respiratory:  Negative for cough, chest tightness and shortness of breath.    Cardiovascular:  Negative for chest pain, palpitations and leg swelling.   Gastrointestinal:  Negative for abdominal pain, blood in stool, diarrhea, nausea and rectal pain.  "  Genitourinary:  Negative for difficulty urinating, dysuria and urgency.   Musculoskeletal:  Negative for arthralgias and joint swelling.   Skin:  Negative for rash.   Allergic/Immunologic: Negative for environmental allergies and food allergies.   Neurological:  Negative for dizziness, tremors, weakness, numbness and headaches.   Hematological:  Negative for adenopathy.   Psychiatric/Behavioral:  Negative for behavioral problems and hallucinations.        Objective   /78 (BP Location: Left arm, Patient Position: Sitting, Cuff Size: Large)   Pulse 80   Temp (!) 97.1 °F (36.2 °C) (Temporal)   Ht 5' 5\" (1.651 m)   Wt 94.8 kg (209 lb)   LMP 05/15/2020   SpO2 97%   BMI 34.78 kg/m²      Physical Exam  HENT:      Head: Normocephalic.   Eyes:      Pupils: Pupils are equal, round, and reactive to light.   Cardiovascular:      Rate and Rhythm: Normal rate and regular rhythm.      Heart sounds: No murmur heard.  Pulmonary:      Breath sounds: Normal breath sounds.   Abdominal:      General: Bowel sounds are normal.      Palpations: Abdomen is soft.   Musculoskeletal:      Cervical back: Normal range of motion.   Skin:     General: Skin is warm.   Neurological:      Mental Status: She is alert and oriented to person, place, and time.   Psychiatric:         Behavior: Behavior normal.         Thought Content: Thought content normal.         "

## 2025-01-27 NOTE — ASSESSMENT & PLAN NOTE
Orders:    semaglutide, 1 mg/dose, (Ozempic) 4 mg/3 mL injection pen; Inject 0.75 mL (1 mg total) under the skin once a week

## 2025-01-28 ENCOUNTER — PATIENT MESSAGE (OUTPATIENT)
Dept: INTERNAL MEDICINE CLINIC | Age: 50
End: 2025-01-28

## 2025-01-28 ENCOUNTER — TELEPHONE (OUTPATIENT)
Age: 50
End: 2025-01-28

## 2025-01-28 DIAGNOSIS — R73.03 PREDIABETES: ICD-10-CM

## 2025-01-28 DIAGNOSIS — R73.03 PREDIABETES: Primary | ICD-10-CM

## 2025-01-28 RX ORDER — SEMAGLUTIDE 0.25 MG/.5ML
INJECTION, SOLUTION SUBCUTANEOUS
Qty: 2 ML | Refills: 0 | Status: SHIPPED | OUTPATIENT
Start: 2025-01-28

## 2025-01-28 RX ORDER — SEMAGLUTIDE 0.25 MG/.5ML
INJECTION, SOLUTION SUBCUTANEOUS
Qty: 2 ML | Refills: 0 | Status: CANCELLED | OUTPATIENT
Start: 2025-01-28

## 2025-01-28 NOTE — TELEPHONE ENCOUNTER
Ozempic will not be covered with or without a prior authorization, Ozempic is not FDA approved for obesity, prediabetes, etc. Ozempic is only FDA Approved for Type 2 Diabetes and will only be Approved via a Prior Authorization if patient has a diagnosis of Type 2 Diabetes.

## 2025-01-28 NOTE — TELEPHONE ENCOUNTER
Reason for call:   [x] Prior Auth  [] Other:     Caller:  [] Patient  [x] Pharmacy  Name:   Address:   Callback Number:     Medication: Wegovy    Dose/Frequency: 0.25 mg/0.5 ml - inject 0.25 mg under skin once weekly for 28 days     Quantity: 2 ml    Ordering Provider:   [x] PCP/Provider -   [] Speciality/Provider -     Has the patient tried other medications and failed? If failed, which medications did they fail?    [] No   [x] Yes - Ozempic denied    Is the patient's insurance updated in EPIC?   [x] Yes   [] No     Is a copy of the patient's insurance scanned in EPIC?   [x] Yes   [] No

## 2025-01-28 NOTE — PATIENT COMMUNICATION
Please be advised, pharmacy called to report Wegovy also needs a PA, initiated and sent to PA team

## 2025-01-29 NOTE — TELEPHONE ENCOUNTER
PA for (Wegovy) 0.25 MG/0.5ML SUBMITTED to [facility]    via    []CMM-KEY:   [x]Surescripts-Case ID # [id number]   []Availity-Auth ID # NDC #   []Faxed to plan   []Other website   []Phone call Case ID #     []PA sent as URGENT    All office notes, labs and other pertaining documents and studies sent. Clinical questions answered. Awaiting determination from insurance company.     Turnaround time for your insurance to make a decision on your Prior Authorization can take 7-21 business days.

## 2025-01-29 NOTE — TELEPHONE ENCOUNTER
PA for (Wegovy) 0.25 MG/0.5ML EXCLUDED from plan       Reason:(Screenshot if applicable)        Message sent to office clinical pool Yes

## 2025-01-30 NOTE — TELEPHONE ENCOUNTER
Pt calling because she called her insurance and they did confirm if the Ozempic PA is submitted w/ the pre-diabetic diagnosis, they would be able to approve it. Please advise and resubmit if able to, TY.

## 2025-02-02 RX ORDER — SEMAGLUTIDE 0.25 MG/.5ML
INJECTION, SOLUTION SUBCUTANEOUS
Qty: 2 ML | Refills: 0 | Status: CANCELLED | OUTPATIENT
Start: 2025-02-02

## 2025-02-03 NOTE — TELEPHONE ENCOUNTER
If provider would like to continue with PA for Ozempic, please place an active order for this as it has been discontinued off patient medication list.

## 2025-02-03 NOTE — TELEPHONE ENCOUNTER
Please resubmit prior auth to insurance per patient. Patient states insurance will cover medication with dx of prediabetes. Last A1c done on 10/14/24 was 6.1.     Thank you

## 2025-02-06 NOTE — TELEPHONE ENCOUNTER
Teja from [facility] for looking for PA for Wegovy. Told her that message has been sent to Dr. Lucas on 2/3/25 for diagnosis.    Please do the needful.    Thank you!!

## 2025-02-07 ENCOUNTER — TELEPHONE (OUTPATIENT)
Age: 50
End: 2025-02-07

## 2025-02-07 NOTE — TELEPHONE ENCOUNTER
PA for Ozempic 1mg SUBMITTED to [facility]    via    []CMM-KEY:   [x]Surescripts-Case ID #: [id number]   []Availity-Auth ID #   []Faxed to plan   []Other website   []Phone call Case ID #     []PA sent as URGENT    All office notes, labs and other pertaining documents and studies sent. Clinical questions answered. Awaiting determination from insurance company.     Turnaround time for your insurance to make a decision on your Prior Authorization can take 7-21 business days.

## 2025-02-07 NOTE — TELEPHONE ENCOUNTER
For prior auth to be started for ozempic, Refill needs to be sent to pharmacy - Ozempic linked to prediabetes and BMI

## 2025-02-10 NOTE — TELEPHONE ENCOUNTER
PA for Ozempic 1mg NOT REQUIRED     Reason (screenshot if applicable)          Patient advised by          [] MyChart Message  [x] Phone call   [x]LMOM  []L/M to call office as no active Communication consent on file  []Unable to leave detailed message as VM not approved on Communication consent       Pharmacy advised by    []Fax  [x]Phone call

## 2025-02-18 ENCOUNTER — APPOINTMENT (OUTPATIENT)
Dept: LAB | Facility: IMAGING CENTER | Age: 50
End: 2025-02-18
Payer: COMMERCIAL

## 2025-02-18 DIAGNOSIS — E78.00 HYPERCHOLESTEROLEMIA: ICD-10-CM

## 2025-02-18 DIAGNOSIS — R73.03 PREDIABETES: ICD-10-CM

## 2025-02-18 LAB
ALBUMIN SERPL BCG-MCNC: 4.4 G/DL (ref 3.5–5)
ALP SERPL-CCNC: 76 U/L (ref 34–104)
ALT SERPL W P-5'-P-CCNC: 22 U/L (ref 7–52)
AST SERPL W P-5'-P-CCNC: 19 U/L (ref 13–39)
BILIRUB DIRECT SERPL-MCNC: 0.08 MG/DL (ref 0–0.2)
BILIRUB SERPL-MCNC: 0.62 MG/DL (ref 0.2–1)
CHOLEST SERPL-MCNC: 168 MG/DL (ref ?–200)
HDLC SERPL-MCNC: 53 MG/DL
LDLC SERPL CALC-MCNC: 74 MG/DL (ref 0–100)
NONHDLC SERPL-MCNC: 115 MG/DL
PROT SERPL-MCNC: 7.4 G/DL (ref 6.4–8.4)
TRIGL SERPL-MCNC: 207 MG/DL (ref ?–150)

## 2025-02-18 PROCEDURE — 83036 HEMOGLOBIN GLYCOSYLATED A1C: CPT

## 2025-02-18 PROCEDURE — 80061 LIPID PANEL: CPT

## 2025-02-18 PROCEDURE — 80076 HEPATIC FUNCTION PANEL: CPT

## 2025-02-18 PROCEDURE — 36415 COLL VENOUS BLD VENIPUNCTURE: CPT

## 2025-02-19 LAB
EST. AVERAGE GLUCOSE BLD GHB EST-MCNC: 126 MG/DL
HBA1C MFR BLD: 6 %

## 2025-04-09 DIAGNOSIS — F32.A ANXIETY AND DEPRESSION: ICD-10-CM

## 2025-04-09 DIAGNOSIS — F41.9 ANXIETY AND DEPRESSION: ICD-10-CM

## 2025-04-09 DIAGNOSIS — E78.00 HYPERCHOLESTEROLEMIA: ICD-10-CM

## 2025-04-09 DIAGNOSIS — L65.9 HAIR LOSS: ICD-10-CM

## 2025-04-10 RX ORDER — ROSUVASTATIN CALCIUM 10 MG/1
40 TABLET, COATED ORAL DAILY
Qty: 180 TABLET | Refills: 1 | Status: SHIPPED | OUTPATIENT
Start: 2025-04-10

## 2025-04-10 RX ORDER — ESCITALOPRAM OXALATE 10 MG/1
5 TABLET ORAL
Qty: 45 TABLET | Refills: 1 | Status: SHIPPED | OUTPATIENT
Start: 2025-04-10 | End: 2025-10-07

## 2025-05-23 ENCOUNTER — PATIENT MESSAGE (OUTPATIENT)
Dept: INTERNAL MEDICINE CLINIC | Age: 50
End: 2025-05-23

## 2025-05-23 DIAGNOSIS — R73.03 PREDIABETES: ICD-10-CM

## 2025-06-23 ENCOUNTER — OFFICE VISIT (OUTPATIENT)
Dept: INTERNAL MEDICINE CLINIC | Age: 50
End: 2025-06-23
Payer: COMMERCIAL

## 2025-06-23 VITALS
WEIGHT: 201 LBS | HEIGHT: 65 IN | SYSTOLIC BLOOD PRESSURE: 116 MMHG | BODY MASS INDEX: 33.49 KG/M2 | OXYGEN SATURATION: 97 % | HEART RATE: 86 BPM | DIASTOLIC BLOOD PRESSURE: 78 MMHG | TEMPERATURE: 98.1 F

## 2025-06-23 DIAGNOSIS — E55.9 VITAMIN D DEFICIENCY: ICD-10-CM

## 2025-06-23 DIAGNOSIS — F41.9 ANXIETY AND DEPRESSION: ICD-10-CM

## 2025-06-23 DIAGNOSIS — E78.00 HYPERCHOLESTEROLEMIA: ICD-10-CM

## 2025-06-23 DIAGNOSIS — R73.03 PREDIABETES: ICD-10-CM

## 2025-06-23 DIAGNOSIS — F32.A ANXIETY AND DEPRESSION: ICD-10-CM

## 2025-06-23 PROBLEM — L65.9 HAIR LOSS: Status: RESOLVED | Noted: 2018-05-11 | Resolved: 2025-06-23

## 2025-06-23 LAB — SL AMB POCT HEMOGLOBIN AIC: 5.7 (ref ?–6.5)

## 2025-06-23 PROCEDURE — 99214 OFFICE O/P EST MOD 30 MIN: CPT | Performed by: INTERNAL MEDICINE

## 2025-06-23 PROCEDURE — 83036 HEMOGLOBIN GLYCOSYLATED A1C: CPT | Performed by: INTERNAL MEDICINE

## 2025-06-23 RX ORDER — ROSUVASTATIN CALCIUM 10 MG/1
40 TABLET, COATED ORAL DAILY
Qty: 180 TABLET | Refills: 7 | Status: SHIPPED | OUTPATIENT
Start: 2025-06-23

## 2025-06-23 NOTE — ASSESSMENT & PLAN NOTE
Hemoglobin A1c today 5.7  Orders:    Hemoglobin A1C; Future    Comprehensive metabolic panel; Future    Albumin / creatinine urine ratio; Future    POCT hemoglobin A1c

## 2025-06-23 NOTE — ASSESSMENT & PLAN NOTE
Will follow-up with the vitamin D level she is still taking some vitamin D  Orders:    Vitamin D 25 hydroxy; Future

## 2025-06-23 NOTE — PROGRESS NOTES
Name: Ying Reese      : 1975      MRN: [id number]  Encounter Provider: Azar Lucas MD  Encounter Date: 2025   Encounter department: [facility]  :  Assessment & Plan  BMI 35.0-35.9,adult  Almost 20 pound weight loss she is having some diarrhea with the use of Ozempic and she does not wanted to really increase the dose unless she started to gain weight again       Anxiety and depression  Anxiety and depression is a stable         Hypercholesterolemia  Last lipid panel was good patient is on rosuvastatin       Prediabetes  Hemoglobin A1c today 5.7  Orders:    Hemoglobin A1C; Future    Comprehensive metabolic panel; Future    Albumin / creatinine urine ratio; Future    POCT hemoglobin A1c    Vitamin D deficiency  Will follow-up with the vitamin D level she is still taking some vitamin D  Orders:    Vitamin D 25 hydroxy; Future           History of Present Illness   HPI  Review of Systems   Constitutional:  Negative for chills and fatigue.   HENT:  Negative for congestion, ear pain, hearing loss, postnasal drip, sinus pressure, sore throat and voice change.    Eyes:  Negative for pain, discharge and visual disturbance.   Respiratory:  Negative for cough, chest tightness and shortness of breath.    Cardiovascular:  Negative for chest pain, palpitations and leg swelling.   Gastrointestinal:  Negative for abdominal pain, blood in stool, diarrhea, nausea and rectal pain.   Genitourinary:  Negative for difficulty urinating, dysuria and urgency.   Musculoskeletal:  Negative for arthralgias and joint swelling.   Skin:  Negative for rash.   Allergic/Immunologic: Negative for environmental allergies and food allergies.   Neurological:  Negative for dizziness, tremors, weakness, numbness and headaches.   Hematological:  Negative for adenopathy.   Psychiatric/Behavioral:  Negative for behavioral problems and hallucinations.        Objective   /78 (BP Location: Left  "arm, Patient Position: Sitting, Cuff Size: Large)   Pulse 86   Temp 98.1 °F (36.7 °C) (Temporal)   Ht 5' 5\" (1.651 m)   Wt 91.2 kg (201 lb)   LMP 05/15/2020   SpO2 97%   BMI 33.45 kg/m²      Physical Exam  HENT:      Head: Normocephalic.     Eyes:      Pupils: Pupils are equal, round, and reactive to light.       Cardiovascular:      Rate and Rhythm: Normal rate and regular rhythm.      Heart sounds: No murmur heard.  Pulmonary:      Breath sounds: Normal breath sounds.   Abdominal:      General: Bowel sounds are normal.      Palpations: Abdomen is soft.     Musculoskeletal:      Cervical back: Normal range of motion.     Skin:     General: Skin is warm.     Neurological:      Mental Status: She is alert and oriented to person, place, and time.     Psychiatric:         Behavior: Behavior normal.         Thought Content: Thought content normal.         "

## 2025-06-23 NOTE — ASSESSMENT & PLAN NOTE
Almost 20 pound weight loss she is having some diarrhea with the use of Ozempic and she does not wanted to really increase the dose unless she started to gain weight again

## 2025-08-02 DIAGNOSIS — F32.A ANXIETY AND DEPRESSION: ICD-10-CM

## 2025-08-02 DIAGNOSIS — L65.9 HAIR LOSS: ICD-10-CM

## 2025-08-02 DIAGNOSIS — F41.9 ANXIETY AND DEPRESSION: ICD-10-CM

## 2025-08-04 RX ORDER — ESCITALOPRAM OXALATE 10 MG/1
5 TABLET ORAL
Qty: 45 TABLET | Refills: 1 | Status: SHIPPED | OUTPATIENT
Start: 2025-08-04

## (undated) DEVICE — SUT MONOCRYL 4-0 PS-2 27 IN Y426H

## (undated) DEVICE — CANNULA SEAL

## (undated) DEVICE — PROGRASP FORCEPS: Brand: ENDOWRIST

## (undated) DEVICE — SYRINGE 50ML LL

## (undated) DEVICE — TROCAR PORT ACCESS 5 X120MML W/BLDLS OPTICAL TIP AIRSEAL

## (undated) DEVICE — LIGHT HANDLE COVER SLEEVE DISP BLUE STELLAR

## (undated) DEVICE — COLUMN DRAPE

## (undated) DEVICE — MONOPOLAR CURVED SCISSORS: Brand: ENDOWRIST

## (undated) DEVICE — PVC URETHRAL CATHETER: Brand: DOVER

## (undated) DEVICE — NEEDLE 25G X 1 1/2

## (undated) DEVICE — UTERINE MANIPULATOR RUMI 6.7 X 10 CM

## (undated) DEVICE — VISUALIZATION SYSTEM: Brand: CLEARIFY

## (undated) DEVICE — STRL ALLENTOWN HYSTEROSCOPY PK: Brand: CARDINAL HEALTH

## (undated) DEVICE — ADHESIVE SKIN HIGH VISCOSITY EXOFIN MICRO 0.5ML

## (undated) DEVICE — ROBOT DRAPE INST ARM DA VINCI SI

## (undated) DEVICE — Device: Brand: OMNICLOSE TROCAR SITE CLOSURE DEVICE

## (undated) DEVICE — INTENDED FOR TISSUE SEPARATION, AND OTHER PROCEDURES THAT REQUIRE A SHARP SURGICAL BLADE TO PUNCTURE OR CUT.: Brand: BARD-PARKER SAFETY BLADES SIZE 15, STERILE

## (undated) DEVICE — BETHLEHEM UNIVERSAL MINOR VAG: Brand: CARDINAL HEALTH

## (undated) DEVICE — SUT STRATAFIX SPIRAL 2-0 CT-1 30 CM SXPP1B410

## (undated) DEVICE — SPONGE LAP 18 X 18 IN STRL RFD

## (undated) DEVICE — SYRINGE 10ML LL

## (undated) DEVICE — STERILE SURGICAL LUBRICANT,  TUBE: Brand: SURGILUBE

## (undated) DEVICE — CYSTO TUBING SINGLE IRRIGATION

## (undated) DEVICE — 40595 XL TRENDELENBURG POSITIONING KIT: Brand: 40595 XL TRENDELENBURG POSITIONING KIT

## (undated) DEVICE — TUBING SUCTION 5MM X 12 FT

## (undated) DEVICE — TRAY FOLEY 16FR URIMETER SILICONE SURESTEP

## (undated) DEVICE — CHLORHEXIDINE 4PCT 4 OZ

## (undated) DEVICE — OCCLUDER COLPO-PNEUMO

## (undated) DEVICE — ADHESIVE SKIN HIGH VISCOSITY EXOFIN 1ML

## (undated) DEVICE — GLOVE INDICATOR PI UNDERGLOVE SZ 7 BLUE

## (undated) DEVICE — PACK FLUENT DISP

## (undated) DEVICE — KIT, BETHLEHEM ROBOTIC PROST: Brand: CARDINAL HEALTH

## (undated) DEVICE — HEAVY DUTY TABLE COVER: Brand: CONVERTORS

## (undated) DEVICE — TIP COVER ACCESSORY

## (undated) DEVICE — 1820 FOAM BLOCK NEEDLE COUNTER: Brand: DEVON

## (undated) DEVICE — LARGE NEEDLE DRIVER: Brand: ENDOWRIST

## (undated) DEVICE — PREMIUM DRY TRAY LF: Brand: MEDLINE INDUSTRIES, INC.

## (undated) DEVICE — CHLORAPREP HI-LITE 26ML ORANGE

## (undated) DEVICE — MAYO STAND COVER: Brand: CONVERTORS

## (undated) DEVICE — GLOVE PI ULTRA TOUCH SZ.6.5

## (undated) DEVICE — MEDI-VAC YANK SUCT HNDL W/TPRD BULBOUS TIP: Brand: CARDINAL HEALTH

## (undated) DEVICE — INTENDED FOR TISSUE SEPARATION, AND OTHER PROCEDURES THAT REQUIRE A SHARP SURGICAL BLADE TO PUNCTURE OR CUT.: Brand: BARD-PARKER SAFETY BLADES SIZE 11, STERILE

## (undated) DEVICE — LUBRICANT INST ELECTROLUBE ANTISTK WO PAD

## (undated) DEVICE — TROCAR PORT ACCESS 12 X120MM W/BLDLS OPTICAL TIP AIRSEAL

## (undated) DEVICE — VESSEL SEALER EXTEND: Brand: ENDOWRIST

## (undated) DEVICE — LAPAROSCOPIC TROCAR SLEEVE/SINGLE USE: Brand: KII® OPTICAL ACCESS SYSTEM

## (undated) DEVICE — ANTIBACTERIAL VIOLET BRAIDED (POLYGLACTIN 910), SYNTHETIC ABSORBABLE SUTURE: Brand: COATED VICRYL